# Patient Record
Sex: FEMALE | Race: WHITE | NOT HISPANIC OR LATINO | Employment: UNEMPLOYED | URBAN - METROPOLITAN AREA
[De-identification: names, ages, dates, MRNs, and addresses within clinical notes are randomized per-mention and may not be internally consistent; named-entity substitution may affect disease eponyms.]

---

## 2023-02-27 LAB
ALANINE AMINOTRANSFERASE (SGPT) (U/L) IN SER/PLAS: 33 U/L (ref 7–45)
ALBUMIN (G/DL) IN SER/PLAS: 3.9 G/DL (ref 3.4–5)
ALKALINE PHOSPHATASE (U/L) IN SER/PLAS: 101 U/L (ref 33–110)
AMYLASE (U/L) IN SER/PLAS: 18 U/L (ref 29–103)
ANION GAP IN SER/PLAS: 14 MMOL/L (ref 10–20)
ASPARTATE AMINOTRANSFERASE (SGOT) (U/L) IN SER/PLAS: 21 U/L (ref 9–39)
BASOPHILS (10*3/UL) IN BLOOD BY AUTOMATED COUNT: 0.02 X10E9/L (ref 0–0.1)
BASOPHILS/100 LEUKOCYTES IN BLOOD BY AUTOMATED COUNT: 0.3 % (ref 0–2)
BETA HYDROXYBUTYRATE (MMOL/L) IN SER/PLAS: 0.07 MMOL/L (ref 0.02–0.27)
BILIRUBIN TOTAL (MG/DL) IN SER/PLAS: 0.5 MG/DL (ref 0–1.2)
CALCIDIOL (25 OH VITAMIN D3) (NG/ML) IN SER/PLAS: 17 NG/ML
CALCIUM (MG/DL) IN SER/PLAS: 9.4 MG/DL (ref 8.6–10.3)
CARBON DIOXIDE, TOTAL (MMOL/L) IN SER/PLAS: 29 MMOL/L (ref 21–32)
CHLORIDE (MMOL/L) IN SER/PLAS: 103 MMOL/L (ref 98–107)
COBALAMIN (VITAMIN B12) (PG/ML) IN SER/PLAS: 208 PG/ML (ref 211–911)
CORTISOL (UG/DL) IN SERUM - AM: 38.6 UG/DL (ref 5–20)
CREATININE (MG/DL) IN SER/PLAS: 0.95 MG/DL (ref 0.5–1.05)
EOSINOPHILS (10*3/UL) IN BLOOD BY AUTOMATED COUNT: 0.01 X10E9/L (ref 0–0.7)
EOSINOPHILS/100 LEUKOCYTES IN BLOOD BY AUTOMATED COUNT: 0.1 % (ref 0–6)
ERYTHROCYTE DISTRIBUTION WIDTH (RATIO) BY AUTOMATED COUNT: 13.1 % (ref 11.5–14.5)
ERYTHROCYTE MEAN CORPUSCULAR HEMOGLOBIN CONCENTRATION (G/DL) BY AUTOMATED: 32.1 G/DL (ref 32–36)
ERYTHROCYTE MEAN CORPUSCULAR VOLUME (FL) BY AUTOMATED COUNT: 92 FL (ref 80–100)
ERYTHROCYTES (10*6/UL) IN BLOOD BY AUTOMATED COUNT: 4.37 X10E12/L (ref 4–5.2)
ESTIMATED AVERAGE GLUCOSE FOR HBA1C: 148 MG/DL
GFR FEMALE: 69 ML/MIN/1.73M2
GLUCOSE (MG/DL) IN SER/PLAS: 106 MG/DL (ref 74–99)
HEMATOCRIT (%) IN BLOOD BY AUTOMATED COUNT: 40.2 % (ref 36–46)
HEMOGLOBIN (G/DL) IN BLOOD: 12.9 G/DL (ref 12–16)
HEMOGLOBIN A1C/HEMOGLOBIN TOTAL IN BLOOD: 6.8 %
IMMATURE GRANULOCYTES/100 LEUKOCYTES IN BLOOD BY AUTOMATED COUNT: 0.4 % (ref 0–0.9)
LEUKOCYTES (10*3/UL) IN BLOOD BY AUTOMATED COUNT: 6.9 X10E9/L (ref 4.4–11.3)
LIPASE (U/L) IN SER/PLAS: 15 U/L (ref 9–82)
LYMPHOCYTES (10*3/UL) IN BLOOD BY AUTOMATED COUNT: 3.11 X10E9/L (ref 1.2–4.8)
LYMPHOCYTES/100 LEUKOCYTES IN BLOOD BY AUTOMATED COUNT: 44.8 % (ref 13–44)
MONOCYTES (10*3/UL) IN BLOOD BY AUTOMATED COUNT: 0.42 X10E9/L (ref 0.1–1)
MONOCYTES/100 LEUKOCYTES IN BLOOD BY AUTOMATED COUNT: 6.1 % (ref 2–10)
NEUTROPHILS (10*3/UL) IN BLOOD BY AUTOMATED COUNT: 3.35 X10E9/L (ref 1.2–7.7)
NEUTROPHILS/100 LEUKOCYTES IN BLOOD BY AUTOMATED COUNT: 48.3 % (ref 40–80)
PLATELETS (10*3/UL) IN BLOOD AUTOMATED COUNT: 275 X10E9/L (ref 150–450)
POTASSIUM (MMOL/L) IN SER/PLAS: 4.2 MMOL/L (ref 3.5–5.3)
PROTEIN TOTAL: 7.4 G/DL (ref 6.4–8.2)
SODIUM (MMOL/L) IN SER/PLAS: 142 MMOL/L (ref 136–145)
THYROTROPIN (MIU/L) IN SER/PLAS BY DETECTION LIMIT <= 0.05 MIU/L: 2.21 MIU/L (ref 0.44–3.98)
THYROXINE (T4) FREE (NG/DL) IN SER/PLAS: 1.09 NG/DL (ref 0.61–1.12)
UREA NITROGEN (MG/DL) IN SER/PLAS: 18 MG/DL (ref 6–23)

## 2023-03-01 LAB
CHOLESTEROL (MG/DL) IN SER/PLAS: 170 MG/DL (ref 0–199)
CHOLESTEROL IN HDL (MG/DL) IN SER/PLAS: 40.4 MG/DL
CHOLESTEROL/HDL RATIO: 4.2
LDL: 80 MG/DL (ref 0–99)
NON HDL CHOLESTEROL: 130 MG/DL
TRIGLYCERIDE (MG/DL) IN SER/PLAS: 250 MG/DL (ref 0–149)
VLDL: 50 MG/DL (ref 0–40)

## 2023-03-02 LAB — ADRENOCORTICOTROPIC HORMONE: <1.5 PG/ML (ref 7.2–63.3)

## 2023-03-12 DIAGNOSIS — F41.9 ANXIETY: Primary | ICD-10-CM

## 2023-03-16 PROBLEM — J34.89 SINUS DRAINAGE: Status: ACTIVE | Noted: 2023-03-16

## 2023-03-16 PROBLEM — R68.3 CLUBBING OF FINGERS: Status: ACTIVE | Noted: 2023-03-16

## 2023-03-16 PROBLEM — C34.90: Status: ACTIVE | Noted: 2023-03-16

## 2023-03-16 PROBLEM — L02.92 BOIL: Status: ACTIVE | Noted: 2023-03-16

## 2023-03-16 PROBLEM — C34.90 LUNG CANCER METASTATIC TO BRAIN (MULTI): Status: ACTIVE | Noted: 2023-03-16

## 2023-03-16 PROBLEM — E11.9 DIABETES (MULTI): Status: ACTIVE | Noted: 2023-03-16

## 2023-03-16 PROBLEM — R04.2 HEMOPTYSIS: Status: ACTIVE | Noted: 2023-03-16

## 2023-03-16 PROBLEM — E78.1 HYPERTRIGLYCERIDEMIA: Status: ACTIVE | Noted: 2023-03-16

## 2023-03-16 PROBLEM — R73.9 ELEVATED RANDOM BLOOD GLUCOSE LEVEL: Status: ACTIVE | Noted: 2023-03-16

## 2023-03-16 PROBLEM — K21.9 GERD (GASTROESOPHAGEAL REFLUX DISEASE): Status: ACTIVE | Noted: 2023-03-16

## 2023-03-16 PROBLEM — H81.10 BPPV (BENIGN PAROXYSMAL POSITIONAL VERTIGO): Status: ACTIVE | Noted: 2023-03-16

## 2023-03-16 PROBLEM — K58.9 IBS (IRRITABLE BOWEL SYNDROME): Status: ACTIVE | Noted: 2023-03-16

## 2023-03-16 PROBLEM — I83.93 VARICOSE VEINS OF LEGS: Status: ACTIVE | Noted: 2023-03-16

## 2023-03-16 PROBLEM — F41.9 ANXIETY: Status: ACTIVE | Noted: 2023-03-16

## 2023-03-16 PROBLEM — K82.8 GALLBLADDER SLUDGE: Status: ACTIVE | Noted: 2023-03-16

## 2023-03-16 PROBLEM — R73.09 ELEVATED RANDOM BLOOD GLUCOSE LEVEL: Status: ACTIVE | Noted: 2023-03-16

## 2023-03-16 PROBLEM — N18.31 STAGE 3A CHRONIC KIDNEY DISEASE (MULTI): Status: ACTIVE | Noted: 2023-03-16

## 2023-03-16 PROBLEM — E03.2 HYPOTHYROIDISM DUE TO MEDICATION: Status: ACTIVE | Noted: 2023-03-16

## 2023-03-16 PROBLEM — J32.9 CHRONIC SINUSITIS: Status: ACTIVE | Noted: 2023-03-16

## 2023-03-16 PROBLEM — E55.9 MILD VITAMIN D DEFICIENCY: Status: ACTIVE | Noted: 2023-03-16

## 2023-03-16 PROBLEM — R91.8 LUNG MASS: Status: ACTIVE | Noted: 2023-03-16

## 2023-03-16 PROBLEM — Z86.0100 HISTORY OF COLON POLYPS: Status: ACTIVE | Noted: 2023-03-16

## 2023-03-16 PROBLEM — T31.0 BURNS INVOLVING LESS THAN 10% OF BODY SURFACE: Status: ACTIVE | Noted: 2023-03-16

## 2023-03-16 PROBLEM — L03.319 CELLULITIS OF TRUNK: Status: ACTIVE | Noted: 2023-03-16

## 2023-03-16 PROBLEM — C79.31 METASTATIC CANCER TO BRAIN (MULTI): Status: ACTIVE | Noted: 2023-03-16

## 2023-03-16 PROBLEM — H55.00 NYSTAGMUS: Status: ACTIVE | Noted: 2023-03-16

## 2023-03-16 PROBLEM — H93.11 TINNITUS OF RIGHT EAR: Status: ACTIVE | Noted: 2023-03-16

## 2023-03-16 PROBLEM — R59.0 MEDIASTINAL LYMPHADENOPATHY: Status: ACTIVE | Noted: 2023-03-16

## 2023-03-16 PROBLEM — F32.A DEPRESSION: Status: ACTIVE | Noted: 2023-03-16

## 2023-03-16 PROBLEM — G45.8 SUBCLAVIAN STEAL SYNDROME: Status: ACTIVE | Noted: 2023-03-16

## 2023-03-16 PROBLEM — C34.90 ADENOCARCINOMA, LUNG (MULTI): Status: ACTIVE | Noted: 2023-03-16

## 2023-03-16 PROBLEM — C79.31 LUNG CANCER METASTATIC TO BRAIN (MULTI): Status: ACTIVE | Noted: 2023-03-16

## 2023-03-16 PROBLEM — M25.531 RIGHT WRIST PAIN: Status: ACTIVE | Noted: 2023-03-16

## 2023-03-16 PROBLEM — R73.03 PREDIABETES: Status: ACTIVE | Noted: 2023-03-16

## 2023-03-16 PROBLEM — B35.1 ONYCHOMYCOSIS OF TOENAIL: Status: ACTIVE | Noted: 2023-03-16

## 2023-03-16 PROBLEM — L70.0 OPEN COMEDONE: Status: ACTIVE | Noted: 2023-03-16

## 2023-03-16 PROBLEM — R06.02 SOB (SHORTNESS OF BREATH): Status: ACTIVE | Noted: 2023-03-16

## 2023-03-16 PROBLEM — G47.33 OSA (OBSTRUCTIVE SLEEP APNEA): Status: ACTIVE | Noted: 2023-03-16

## 2023-03-16 PROBLEM — Z86.010 HISTORY OF COLON POLYPS: Status: ACTIVE | Noted: 2023-03-16

## 2023-03-16 PROBLEM — R74.01 ELEVATED ALT MEASUREMENT: Status: ACTIVE | Noted: 2023-03-16

## 2023-03-16 PROBLEM — I73.9 PAD (PERIPHERAL ARTERY DISEASE) (CMS-HCC): Status: ACTIVE | Noted: 2023-03-16

## 2023-03-16 PROBLEM — R09.89 BRUIT OF RIGHT CAROTID ARTERY: Status: ACTIVE | Noted: 2023-03-16

## 2023-03-16 PROBLEM — E27.40 ADRENAL INSUFFICIENCY (MULTI): Status: ACTIVE | Noted: 2023-03-16

## 2023-03-16 RX ORDER — ONDANSETRON 4 MG/1
4 TABLET, FILM COATED ORAL EVERY 8 HOURS PRN
COMMUNITY
End: 2023-03-22 | Stop reason: SDUPTHER

## 2023-03-16 RX ORDER — TRAZODONE HYDROCHLORIDE 50 MG/1
50 TABLET ORAL NIGHTLY PRN
COMMUNITY
Start: 2023-02-21 | End: 2023-05-16 | Stop reason: SDUPTHER

## 2023-03-16 RX ORDER — LEVOTHYROXINE SODIUM 175 UG/1
175 TABLET ORAL EVERY OTHER DAY
COMMUNITY

## 2023-03-16 RX ORDER — HYDROCORTISONE 10 MG/1
TABLET ORAL
COMMUNITY
End: 2024-01-30 | Stop reason: SDUPTHER

## 2023-03-16 RX ORDER — BLOOD SUGAR DIAGNOSTIC
STRIP MISCELLANEOUS
COMMUNITY

## 2023-03-16 RX ORDER — ERGOCALCIFEROL 1.25 MG/1
1 CAPSULE ORAL
COMMUNITY
Start: 2023-03-01 | End: 2023-05-16 | Stop reason: SDUPTHER

## 2023-03-16 RX ORDER — LANCETS
EACH MISCELLANEOUS
COMMUNITY
Start: 2022-12-22

## 2023-03-16 RX ORDER — PANTOPRAZOLE SODIUM 40 MG/1
40 TABLET, DELAYED RELEASE ORAL
COMMUNITY
End: 2023-03-22 | Stop reason: SDUPTHER

## 2023-03-16 RX ORDER — DEXAMETHASONE SODIUM PHOSPHATE 4 MG/ML
INJECTION, SOLUTION INTRA-ARTICULAR; INTRALESIONAL; INTRAMUSCULAR; INTRAVENOUS; SOFT TISSUE
COMMUNITY
Start: 2022-03-20

## 2023-03-16 RX ORDER — OMEGA-3-ACID ETHYL ESTERS 1 G/1
2 CAPSULE, LIQUID FILLED ORAL
COMMUNITY
End: 2024-01-30 | Stop reason: SDUPTHER

## 2023-03-16 RX ORDER — NAPROXEN SODIUM 220 MG/1
81 TABLET, FILM COATED ORAL
COMMUNITY
Start: 2020-02-19

## 2023-03-16 RX ORDER — LEVOTHYROXINE SODIUM 150 UG/1
150 TABLET ORAL EVERY OTHER DAY
COMMUNITY
End: 2024-01-30 | Stop reason: SDUPTHER

## 2023-03-16 RX ORDER — METFORMIN HYDROCHLORIDE 500 MG/1
500 TABLET ORAL DAILY
COMMUNITY
End: 2024-02-05 | Stop reason: SDUPTHER

## 2023-03-16 RX ORDER — CLOPIDOGREL BISULFATE 75 MG/1
75 TABLET ORAL DAILY
COMMUNITY
End: 2023-06-20 | Stop reason: SDUPTHER

## 2023-03-16 RX ORDER — SYRINGE WITH NEEDLE, 1 ML 25GX5/8"
SYRINGE, EMPTY DISPOSABLE MISCELLANEOUS
COMMUNITY
Start: 2022-06-22

## 2023-03-16 RX ORDER — REPAGLINIDE 0.5 MG/1
0.5 TABLET ORAL
COMMUNITY
Start: 2023-03-01 | End: 2024-01-30 | Stop reason: ALTCHOICE

## 2023-03-16 RX ORDER — ISOPROPYL ALCOHOL 70 ML/100ML
SWAB TOPICAL
COMMUNITY
Start: 2022-06-20

## 2023-03-16 RX ORDER — IPRATROPIUM BROMIDE 21 UG/1
2 SPRAY, METERED NASAL
COMMUNITY
Start: 2022-09-08

## 2023-03-16 RX ORDER — ATORVASTATIN CALCIUM 20 MG/1
20 TABLET, FILM COATED ORAL DAILY
COMMUNITY
End: 2023-03-22 | Stop reason: SDUPTHER

## 2023-03-16 RX ORDER — LANOLIN ALCOHOL/MO/W.PET/CERES
1000 CREAM (GRAM) TOPICAL DAILY
COMMUNITY
End: 2023-03-17 | Stop reason: SDUPTHER

## 2023-03-17 ENCOUNTER — OFFICE VISIT (OUTPATIENT)
Dept: PRIMARY CARE | Facility: CLINIC | Age: 58
End: 2023-03-17
Payer: MEDICAID

## 2023-03-17 VITALS
OXYGEN SATURATION: 96 % | TEMPERATURE: 97.4 F | DIASTOLIC BLOOD PRESSURE: 71 MMHG | HEIGHT: 66 IN | WEIGHT: 172 LBS | HEART RATE: 75 BPM | SYSTOLIC BLOOD PRESSURE: 109 MMHG | BODY MASS INDEX: 27.64 KG/M2

## 2023-03-17 DIAGNOSIS — E53.8 VITAMIN B12 DEFICIENCY: ICD-10-CM

## 2023-03-17 DIAGNOSIS — K43.9 VENTRAL HERNIA WITHOUT OBSTRUCTION OR GANGRENE: Primary | ICD-10-CM

## 2023-03-17 DIAGNOSIS — E55.9 VITAMIN D DEFICIENCY: ICD-10-CM

## 2023-03-17 DIAGNOSIS — Z12.11 ENCOUNTER FOR COLORECTAL CANCER SCREENING: ICD-10-CM

## 2023-03-17 DIAGNOSIS — Z12.12 ENCOUNTER FOR COLORECTAL CANCER SCREENING: ICD-10-CM

## 2023-03-17 PROCEDURE — 3074F SYST BP LT 130 MM HG: CPT | Performed by: STUDENT IN AN ORGANIZED HEALTH CARE EDUCATION/TRAINING PROGRAM

## 2023-03-17 PROCEDURE — 3044F HG A1C LEVEL LT 7.0%: CPT | Performed by: STUDENT IN AN ORGANIZED HEALTH CARE EDUCATION/TRAINING PROGRAM

## 2023-03-17 PROCEDURE — 1036F TOBACCO NON-USER: CPT | Performed by: STUDENT IN AN ORGANIZED HEALTH CARE EDUCATION/TRAINING PROGRAM

## 2023-03-17 PROCEDURE — 3078F DIAST BP <80 MM HG: CPT | Performed by: STUDENT IN AN ORGANIZED HEALTH CARE EDUCATION/TRAINING PROGRAM

## 2023-03-17 PROCEDURE — 96372 THER/PROPH/DIAG INJ SC/IM: CPT | Performed by: STUDENT IN AN ORGANIZED HEALTH CARE EDUCATION/TRAINING PROGRAM

## 2023-03-17 PROCEDURE — 99213 OFFICE O/P EST LOW 20 MIN: CPT | Performed by: STUDENT IN AN ORGANIZED HEALTH CARE EDUCATION/TRAINING PROGRAM

## 2023-03-17 RX ORDER — LANOLIN ALCOHOL/MO/W.PET/CERES
1000 CREAM (GRAM) TOPICAL DAILY
Qty: 30 TABLET | Refills: 1 | Status: SHIPPED | OUTPATIENT
Start: 2023-03-17 | End: 2023-05-16 | Stop reason: SDUPTHER

## 2023-03-17 RX ORDER — CYANOCOBALAMIN 1000 UG/ML
1000 INJECTION, SOLUTION INTRAMUSCULAR; SUBCUTANEOUS ONCE
Status: COMPLETED | OUTPATIENT
Start: 2023-03-17 | End: 2023-03-17

## 2023-03-17 RX ADMIN — CYANOCOBALAMIN 1000 MCG: 1000 INJECTION, SOLUTION INTRAMUSCULAR; SUBCUTANEOUS at 13:02

## 2023-03-17 ASSESSMENT — ENCOUNTER SYMPTOMS
DIZZINESS: 0
CONSTIPATION: 0
FLANK PAIN: 0
ABDOMINAL PAIN: 0
SHORTNESS OF BREATH: 0
DYSURIA: 0
COUGH: 0
LIGHT-HEADEDNESS: 0
DIARRHEA: 0
NAUSEA: 0
HEADACHES: 0
VOMITING: 0
RHINORRHEA: 0
MYALGIAS: 0
FEVER: 0
FATIGUE: 0
ABDOMINAL DISTENTION: 1
ARTHRALGIAS: 0

## 2023-03-17 NOTE — PROGRESS NOTES
"Subjective   Patient ID: Betsy Sams is a 57 y.o. female who presents for Follow-up (Hernia ).    HPI     Concern for hernia   Patient states that has been present and she would like to have this dealt with as she does not want to keep getting larger.  No significant pain or nausea or vomiting.  No significant tenderness over area specifically.  Patient also would like to get ordered for colonoscopy prior to her wellness visit that is scheduled.  She needs treatment for her vitamin B12 deficiency.    Review of Systems   Constitutional:  Negative for fatigue and fever.   HENT:  Negative for congestion and rhinorrhea.    Respiratory:  Negative for cough and shortness of breath.    Gastrointestinal:  Positive for abdominal distention (hernia). Negative for abdominal pain, constipation, diarrhea, nausea and vomiting.   Genitourinary:  Negative for dysuria, flank pain and urgency.   Musculoskeletal:  Negative for arthralgias and myalgias.   Neurological:  Negative for dizziness, light-headedness and headaches.       Objective   /71   Pulse 75   Temp 36.3 °C (97.4 °F)   Ht 1.676 m (5' 6\")   Wt 78 kg (172 lb)   SpO2 96%   BMI 27.76 kg/m²     Physical Exam  Vitals and nursing note reviewed.   Constitutional:       Appearance: Normal appearance. She is normal weight.   Cardiovascular:      Rate and Rhythm: Normal rate and regular rhythm.      Heart sounds: Normal heart sounds.   Pulmonary:      Effort: Pulmonary effort is normal.      Breath sounds: Normal breath sounds.   Abdominal:      General: Abdomen is flat. Bowel sounds are normal. There is no distension.      Palpations: Abdomen is soft.      Tenderness: There is no abdominal tenderness. There is no rebound.      Hernia: A hernia is present.   Neurological:      Mental Status: She is alert.         Assessment/Plan   Problem List Items Addressed This Visit          Digestive    Ventral hernia without obstruction or gangrene - Primary     Referral " placed to general surgery.         Relevant Orders    Referral to General Surgery       Endocrine/Metabolic    Vitamin D deficiency    Vitamin B12 deficiency     Replacement ordered.  We will check labs at next visit in May         Relevant Medications    cyanocobalamin (Vitamin B-12) 1,000 mcg tablet       Other    Encounter for colorectal cancer screening     Colonoscopy ordered.         Relevant Orders    Colonoscopy     Patient will follow-up for her already scheduled wellness visit.

## 2023-03-22 ENCOUNTER — TELEPHONE (OUTPATIENT)
Dept: PRIMARY CARE | Facility: CLINIC | Age: 58
End: 2023-03-22
Payer: MEDICAID

## 2023-03-22 DIAGNOSIS — R11.0 NAUSEA: ICD-10-CM

## 2023-03-22 DIAGNOSIS — E78.1 HYPERTRIGLYCERIDEMIA: ICD-10-CM

## 2023-03-22 DIAGNOSIS — K21.9 GASTROESOPHAGEAL REFLUX DISEASE, UNSPECIFIED WHETHER ESOPHAGITIS PRESENT: Primary | ICD-10-CM

## 2023-03-22 NOTE — TELEPHONE ENCOUNTER
Pt requesting a refill of the following medications:    [facility] # [phone]    1). Ondansetron HCI  4 MG  Take 1 tablet every 8 hours as needed for nausea  #30 (pt requesting 90 day)  Refill: 1    2). Pantoprazole Sodium  40 MG  1qd  #30 (pt requesting 90 day)  Refill: 1    3). Atorvastatin Calcium  20 MG  1qd  #90  Refill: 3

## 2023-03-23 RX ORDER — ATORVASTATIN CALCIUM 20 MG/1
20 TABLET, FILM COATED ORAL DAILY
Qty: 90 TABLET | Refills: 1 | Status: SHIPPED | OUTPATIENT
Start: 2023-03-23 | End: 2023-06-20 | Stop reason: SDUPTHER

## 2023-03-23 RX ORDER — PANTOPRAZOLE SODIUM 40 MG/1
40 TABLET, DELAYED RELEASE ORAL
Qty: 90 TABLET | Refills: 1 | Status: SHIPPED | OUTPATIENT
Start: 2023-03-23 | End: 2023-06-20 | Stop reason: SDUPTHER

## 2023-03-23 RX ORDER — ONDANSETRON 4 MG/1
4 TABLET, FILM COATED ORAL EVERY 8 HOURS PRN
Qty: 90 TABLET | Refills: 1 | Status: SHIPPED | OUTPATIENT
Start: 2023-03-23 | End: 2023-06-21

## 2023-03-26 PROBLEM — E55.9 VITAMIN D DEFICIENCY: Status: ACTIVE | Noted: 2023-03-26

## 2023-03-26 PROBLEM — E53.8 VITAMIN B12 DEFICIENCY: Status: ACTIVE | Noted: 2023-03-26

## 2023-03-26 PROBLEM — Z12.12 ENCOUNTER FOR COLORECTAL CANCER SCREENING: Status: ACTIVE | Noted: 2023-03-26

## 2023-03-26 PROBLEM — K43.9 VENTRAL HERNIA WITHOUT OBSTRUCTION OR GANGRENE: Status: ACTIVE | Noted: 2023-03-26

## 2023-03-26 PROBLEM — Z12.11 ENCOUNTER FOR COLORECTAL CANCER SCREENING: Status: ACTIVE | Noted: 2023-03-26

## 2023-04-21 ENCOUNTER — TELEMEDICINE (OUTPATIENT)
Dept: PRIMARY CARE | Facility: CLINIC | Age: 58
End: 2023-04-21

## 2023-04-21 ENCOUNTER — APPOINTMENT (OUTPATIENT)
Dept: PRIMARY CARE | Facility: CLINIC | Age: 58
End: 2023-04-21

## 2023-04-21 DIAGNOSIS — F41.9 ANXIETY: Primary | ICD-10-CM

## 2023-04-21 NOTE — PROGRESS NOTES
Collaborative Care ([facility]) Initial Assessment    Session Time  Start: 1:04 pm  End: 2:04 pm     Collaborative Care program information (including case discussion with psychiatry, involvement of [facility] and billing when applicable) was provided and discussed with the patient. Patient Indicated understanding and agreed to proceed.   Confirm: Yes    No data recorded      Reason for Visit / Chief Complaint  Chief Complaint   Patient presents with    Anxiety       Accompanied by: Self  Guardian Status: Self  Caregiver Status: Does not have a caregiver    Review of Symptoms    Sleep   Average Hours Sleep in/Night:  4-6 hours  Prepares Self for Sleep at Time: varies  Usual Wake up Time: 7 am  Sleep Symptoms: interrupted sleep  Sleep Hygiene: fair sleep hygiene    Mood   Symptom Onset/Duration:  2+ years  Current Sx: little interest/pleasure doing things, feeling depressed, feeling tired/little energy, and anger/irritability  Triggers: situation(s) and people  Past Sx:     Anxiety   Symptom Onset/Duration: 2+ years  Current Sx: feeling nervous/anxious/on edge, difficulty stopping/controlling worry, easily annoyed/irritable, and trouble concentrating. Negative thinking habits.  Panic / Somatic Sx: chest pain/discomfort  Triggers: situation(s) and place(s)  Past Sx:     Self-Esteem / Self-Image   Self Esteem Rating (1-10 Scale, 10 being high): 7  Self-Esteem / Self Image Sx: feels has good qualities    Appetite   Description of Overall Appetite: good appetite  Eating Behaviors: eats balanced meals  Concerns with appetite: none, denied    Anger / Irritability  Symptoms of Anger / Irritability: easily agitated, ,can act out towards others.     Communication / Self Expression  Communication Style & Concerns: assertive and aggressive    Trauma    Symptoms Onset/Duration: symptoms more than one month  Traumatic Experiences:  cancer  Current Symptoms Related to Traumatic Experience: fear and difficulty concentrating  Triggers:  situation(s)        Hallucinations / Delusions   Hallucinations & Delusions Experienced: none, denied    Learning Concerns / Memory   Learning Concerns & Sx: none, denied  Memory Concerns & Sx: none, denied    Functional impairment   Impacting ADL's: no impairment   Impacting IADL's: No impairment  Impacting Ability : No impairment    Associated Medical Concerns   Potential Associated Factors: cancer      Comprehensive Behavioral Health History     Medications  Current Mental Health Medications:   None/Unknown    Past Mental Health Medications:   None/Unknown    Concerns / challenges / barriers with taking medications? No concerns    Open to medication recommendations from consulting psychiatrist? Yes    Do you ever forget to take your medication? No  If yes, how often?     Mental Health Treatment History  Mental Health Treatment:   Reason/When/Where/Outcome:     Risk History  Suicidal Thoughts/Method/Intent/Plan: None, denied  Suicide Attempts/Preparations: None, denied  Number of Suicide Attempts: 0  Access to Firearms/Lethal Means: No guns in home  Non-Suicidal Self Injury: None, denied  Last Fowler Risk Score:    Protective Factors: good protective factors    Violence: None, denied  Homicidal Thoughts/Method/Plan/Intent: None, denied  Homicidal Attempts/Preparations: None, denied  Number of Attempts: 0      Substance Use History    Substances  none          Addiction Treatment     Types of Addiction Treatment:  none  Currently Sober?     Status/Length of Sobriety:     Family History    Mental Health / Conditions    Family Member Condition / Diagnosis Medications / Side Effects   Sister Bipolar                           History of Suicide    Family Member Details   cousin Completed attempt           Social History    Housing   Living Situation: lives with ex partner, they are friends  Safe Housing Conditions / Feels Safe in Home: Yes    Employment  Current Employment:  retired /disability  Current  Concerns/Challenges: No    Income   Current Concerns/Challenges: No  Receive Benefits/Assistance: No    Education   Status / Level of Education: Completed high school    Legal   Legal Considerations: None, denied    Relationships   S/O:  not together but still good relationship.  Parents/Guardian:   Siblings: close relationship with sister  Friends: good friendships  Other:             Transportation   Transportation Concerns: active 's license    Temple/ Spirituality   Are you Baptism or Spiritual: Yes  Temple / Practice: Lutheran  Spiritual Practice:     Coping / Strengths / Supports   Coping:   engaging with dogs  Strengths: brave and exercise  Supports: Sister/family, friends, partner      Abuse History  Physical Abuse: No  Sexual Abuse: No  Verbal / Emotional Abuse / Bullying (+Cyber): No   Financial Abuse: No  Domestic Violence: No    Assessment Summary  / Plan    Assessment Summary:  What do you want to work on/get out of collaborative care? Be happier    Met with patient via phone for an intake for collaborative care. Patient is being referred by Dr. Montiel for her anxiety. Patient was cooperative and talkative during the session and at times could be tangential but was able to be redirected back on topic. Patient discussed that she has stage 4 lung cancer and she has been on medication for it for the last two years for her treatment instead of using traditional chemotherapy. Patient also has a good relationship with God which has helped her with these diagnosis, but she is also upset that her cancer was not caught earlier because her scans were misread and not discovered until closer to the end of the year 2 years ago.     Patient reports the following symptoms for her mood, little interest/pleasure doing things, feeling depressed, feeling tired/little energy, and anger/irritability. Patient reports the following symptoms for her anxiety, feeling nervous/anxious/on edge, difficulty  stopping/controlling worry, easily annoyed/irritable, and trouble concentrating. Patient also has negative thinking habits and will experience some chest discomfort as a physical symptoms. Patient reports being easily agitated and irritable. She reports still having a good appetite and healthy self esteem.     Patient denies history of suicidal ideations, homicidal ideations, and has no access to weapons. Patient denies history of hallucinations, delusions, and paranoia. Patient denies hospitalizations and self harming injurious behavior and has never been in treatment for mental health before. Patient discussed that finding out that she had cancer was traumatic for her and she worries about when her time to go will be but also is trying to be accepting of her situation. Patient denies history of abuse including physical, emotional, and sexual.     Patient a great support system in her friends, family, partner, along with her elzbieta. Patient does not currently work due to her medical issues.  Patient would like to be happier and reduce her current symptoms.     Plan:   Psych consult - ongoing and bi-weekly        Provisional Findings / Impressions  Primary: anxiety    Secondary:     Goals

## 2023-04-28 ENCOUNTER — DOCUMENTATION (OUTPATIENT)
Dept: BEHAVIORAL HEALTH | Facility: CLINIC | Age: 58
End: 2023-04-28

## 2023-04-28 NOTE — PROGRESS NOTES
[facility] Psychiatry Consult Note     Betsy Sams is a 57 y.o., referred to Collaborative Care for symptoms of depression and anxiety. I have reviewed the patient with the behavioral health manager and reviewed the patient's electronic record.    Recommendations:   -Betsy is currently interested in therapy alone and working on acceptance of her diagnosis and working on her outlook.     -Can reconsult if interested in medication for anxiety/depression in the future.     Collateral with [facility]:   She noted low energy, anger, lower motivation over the past few years along adjustment to her cancer diagnosis.     Currently on trazodone.     No data recorded    The above treatment considerations and suggestions are based on consultations with the patient's care manager and a review of information available in the electronic medical record. I have not personally examined the patient. All recommendations should be implemented with consideration of the patient's relevant prior history and current clinical status. Please feel free to call me with any questions about the care of this patient. I can easily be reached through [employer].

## 2023-05-01 LAB
ALANINE AMINOTRANSFERASE (SGPT) (U/L) IN SER/PLAS: 30 U/L (ref 7–45)
ALBUMIN (G/DL) IN SER/PLAS: 4 G/DL (ref 3.4–5)
ALKALINE PHOSPHATASE (U/L) IN SER/PLAS: 94 U/L (ref 33–110)
ANION GAP IN SER/PLAS: 14 MMOL/L (ref 10–20)
ASPARTATE AMINOTRANSFERASE (SGOT) (U/L) IN SER/PLAS: 19 U/L (ref 9–39)
BASOPHILS (10*3/UL) IN BLOOD BY AUTOMATED COUNT: 0.03 X10E9/L (ref 0–0.1)
BASOPHILS/100 LEUKOCYTES IN BLOOD BY AUTOMATED COUNT: 0.4 % (ref 0–2)
BILIRUBIN TOTAL (MG/DL) IN SER/PLAS: 0.5 MG/DL (ref 0–1.2)
CALCIUM (MG/DL) IN SER/PLAS: 9.7 MG/DL (ref 8.6–10.3)
CARBON DIOXIDE, TOTAL (MMOL/L) IN SER/PLAS: 25 MMOL/L (ref 21–32)
CHLORIDE (MMOL/L) IN SER/PLAS: 104 MMOL/L (ref 98–107)
CORTISOL (UG/DL) IN SERUM: 36.8 UG/DL (ref 2.5–20)
CREATININE (MG/DL) IN SER/PLAS: 0.96 MG/DL (ref 0.5–1.05)
CREATININE (MG/DL) IN SER/PLAS: 0.97 MG/DL (ref 0.5–1.05)
ERYTHROCYTE DISTRIBUTION WIDTH (RATIO) BY AUTOMATED COUNT: 12.8 % (ref 11.5–14.5)
ERYTHROCYTE MEAN CORPUSCULAR HEMOGLOBIN CONCENTRATION (G/DL) BY AUTOMATED: 32.2 G/DL (ref 32–36)
ERYTHROCYTE MEAN CORPUSCULAR VOLUME (FL) BY AUTOMATED COUNT: 92 FL (ref 80–100)
ERYTHROCYTES (10*6/UL) IN BLOOD BY AUTOMATED COUNT: 4.56 X10E12/L (ref 4–5.2)
GFR FEMALE: 68 ML/MIN/1.73M2
GFR FEMALE: 68 ML/MIN/1.73M2
GLUCOSE (MG/DL) IN SER/PLAS: 97 MG/DL (ref 74–99)
HEMATOCRIT (%) IN BLOOD BY AUTOMATED COUNT: 41.9 % (ref 36–46)
HEMOGLOBIN (G/DL) IN BLOOD: 13.5 G/DL (ref 12–16)
IMMATURE GRANULOCYTES/100 LEUKOCYTES IN BLOOD BY AUTOMATED COUNT: 0.4 % (ref 0–0.9)
LEUKOCYTES (10*3/UL) IN BLOOD BY AUTOMATED COUNT: 8.1 X10E9/L (ref 4.4–11.3)
LYMPHOCYTES (10*3/UL) IN BLOOD BY AUTOMATED COUNT: 2.5 X10E9/L (ref 1.2–4.8)
LYMPHOCYTES/100 LEUKOCYTES IN BLOOD BY AUTOMATED COUNT: 30.8 % (ref 13–44)
MAGNESIUM (MG/DL) IN SER/PLAS: 1.84 MG/DL (ref 1.6–2.4)
MONOCYTES (10*3/UL) IN BLOOD BY AUTOMATED COUNT: 0.4 X10E9/L (ref 0.1–1)
MONOCYTES/100 LEUKOCYTES IN BLOOD BY AUTOMATED COUNT: 4.9 % (ref 2–10)
NEUTROPHILS (10*3/UL) IN BLOOD BY AUTOMATED COUNT: 5.16 X10E9/L (ref 1.2–7.7)
NEUTROPHILS/100 LEUKOCYTES IN BLOOD BY AUTOMATED COUNT: 63.5 % (ref 40–80)
PLATELETS (10*3/UL) IN BLOOD AUTOMATED COUNT: 260 X10E9/L (ref 150–450)
POTASSIUM (MMOL/L) IN SER/PLAS: 4.1 MMOL/L (ref 3.5–5.3)
PROTEIN TOTAL: 7.5 G/DL (ref 6.4–8.2)
SODIUM (MMOL/L) IN SER/PLAS: 139 MMOL/L (ref 136–145)
THYROTROPIN (MIU/L) IN SER/PLAS BY DETECTION LIMIT <= 0.05 MIU/L: 1.25 MIU/L (ref 0.44–3.98)
THYROXINE (T4) (UG/DL) IN SER/PLAS: 15.8 UG/DL (ref 4.5–11.1)
UREA NITROGEN (MG/DL) IN SER/PLAS: 16 MG/DL (ref 6–23)

## 2023-05-02 ENCOUNTER — DOCUMENTATION (OUTPATIENT)
Dept: PRIMARY CARE | Facility: CLINIC | Age: 58
End: 2023-05-02
Payer: MEDICAID

## 2023-05-02 DIAGNOSIS — F32.A DEPRESSION, UNSPECIFIED DEPRESSION TYPE: ICD-10-CM

## 2023-05-02 DIAGNOSIS — F41.9 ANXIETY: Primary | ICD-10-CM

## 2023-05-02 PROCEDURE — 99492 1ST PSYC COLLAB CARE MGMT: CPT | Performed by: STUDENT IN AN ORGANIZED HEALTH CARE EDUCATION/TRAINING PROGRAM

## 2023-05-04 LAB — ADRENOCORTICOTROPIC HORMONE: 2.8 PG/ML (ref 7.2–63.3)

## 2023-05-08 ENCOUNTER — SOCIAL WORK (OUTPATIENT)
Dept: PRIMARY CARE | Facility: CLINIC | Age: 58
End: 2023-05-08

## 2023-05-08 ASSESSMENT — ANXIETY QUESTIONNAIRES
IF YOU CHECKED OFF ANY PROBLEMS ON THIS QUESTIONNAIRE, HOW DIFFICULT HAVE THESE PROBLEMS MADE IT FOR YOU TO DO YOUR WORK, TAKE CARE OF THINGS AT HOME, OR GET ALONG WITH OTHER PEOPLE: SOMEWHAT DIFFICULT
7. FEELING AFRAID AS IF SOMETHING AWFUL MIGHT HAPPEN: SEVERAL DAYS
5. BEING SO RESTLESS THAT IT IS HARD TO SIT STILL: SEVERAL DAYS
4. TROUBLE RELAXING: MORE THAN HALF THE DAYS
6. BECOMING EASILY ANNOYED OR IRRITABLE: SEVERAL DAYS
2. NOT BEING ABLE TO STOP OR CONTROL WORRYING: SEVERAL DAYS
GAD7 TOTAL SCORE: 8
3. WORRYING TOO MUCH ABOUT DIFFERENT THINGS: SEVERAL DAYS
1. FEELING NERVOUS, ANXIOUS, OR ON EDGE: SEVERAL DAYS

## 2023-05-08 ASSESSMENT — PATIENT HEALTH QUESTIONNAIRE - PHQ9
6. FEELING BAD ABOUT YOURSELF - OR THAT YOU ARE A FAILURE OR HAVE LET YOURSELF OR YOUR FAMILY DOWN: SEVERAL DAYS
1. LITTLE INTEREST OR PLEASURE IN DOING THINGS: SEVERAL DAYS
9. THOUGHTS THAT YOU WOULD BE BETTER OFF DEAD, OR OF HURTING YOURSELF: NOT AT ALL
7. TROUBLE CONCENTRATING ON THINGS, SUCH AS READING THE NEWSPAPER OR WATCHING TELEVISION: NOT AT ALL
3. TROUBLE FALLING OR STAYING ASLEEP: SEVERAL DAYS
2. FEELING DOWN, DEPRESSED OR HOPELESS: MORE THAN HALF THE DAYS
SUM OF ALL RESPONSES TO PHQ QUESTIONS 1-9: 9
4. FEELING TIRED OR HAVING LITTLE ENERGY: NEARLY EVERY DAY
SUM OF ALL RESPONSES TO PHQ9 QUESTIONS 1 & 2: 3
10. IF YOU CHECKED OFF ANY PROBLEMS, HOW DIFFICULT HAVE THESE PROBLEMS MADE IT FOR YOU TO DO YOUR WORK, TAKE CARE OF THINGS AT HOME, OR GET ALONG WITH OTHER PEOPLE: SOMEWHAT DIFFICULT
5. POOR APPETITE OR OVEREATING: NOT AT ALL
8. MOVING OR SPEAKING SO SLOWLY THAT OTHER PEOPLE COULD HAVE NOTICED. OR THE OPPOSITE, BEING SO FIGETY OR RESTLESS THAT YOU HAVE BEEN MOVING AROUND A LOT MORE THAN USUAL: SEVERAL DAYS

## 2023-05-08 NOTE — PROGRESS NOTES
Collaborative Care ([facility])  Progress Note    Type of Interaction: In Office    Start Time: 11:08 am    End Time: 12:08 pm        Appointment: Scheduled    Reason for Visit:   Chief Complaint   Patient presents with    Anxiety    Depression    Met with patient for a follow up appointment in person. Scans came back okay that there has been no growth for her lung cancer and so it is just watch and wait; patient has been treatment for 2 years. Patient discussed that she did not get that much sleep and how tired she has been as of late and found out that she has low vitamin d and B 12.  Talked about what she would like to do with her time left which includes being happier and learning how to relax more and enjoy life. Discussed stressors with her partner that she lives with, they have been together for 17 and now there chula some ongoing issues. Patient would like to be in a good place before she passes on and is hopeful that she can be.       Interval History / Patient Symptoms:     Patient Health Questionnaire-9 Score: 9 (5/8/2023 12:24 PM)  COLT-7 Total Score: 8 (5/8/2023 12:25 PM)        Interventions Provided: Treatment Planning and person centered      Progress Made: Mixed    Response to Intervention: patient engaging and discussed what she would like to work on and what she struggles with.         Plan:   Follow Up / Next Appointment: Next appointment: 05/22/23

## 2023-05-16 ENCOUNTER — OFFICE VISIT (OUTPATIENT)
Dept: PRIMARY CARE | Facility: CLINIC | Age: 58
End: 2023-05-16
Payer: MEDICAID

## 2023-05-16 ENCOUNTER — LAB (OUTPATIENT)
Dept: LAB | Facility: LAB | Age: 58
End: 2023-05-16
Payer: MEDICAID

## 2023-05-16 VITALS
OXYGEN SATURATION: 94 % | SYSTOLIC BLOOD PRESSURE: 99 MMHG | DIASTOLIC BLOOD PRESSURE: 64 MMHG | WEIGHT: 175 LBS | HEART RATE: 66 BPM | TEMPERATURE: 97.7 F | BODY MASS INDEX: 28.12 KG/M2 | HEIGHT: 66 IN

## 2023-05-16 DIAGNOSIS — F32.A DEPRESSION, UNSPECIFIED DEPRESSION TYPE: ICD-10-CM

## 2023-05-16 DIAGNOSIS — C79.31 METASTATIC CANCER TO BRAIN (MULTI): ICD-10-CM

## 2023-05-16 DIAGNOSIS — C34.91 ADENOCARCINOMA OF RIGHT LUNG (MULTI): ICD-10-CM

## 2023-05-16 DIAGNOSIS — E55.9 VITAMIN D DEFICIENCY: ICD-10-CM

## 2023-05-16 DIAGNOSIS — E78.1 HYPERTRIGLYCERIDEMIA: ICD-10-CM

## 2023-05-16 DIAGNOSIS — C34.91: ICD-10-CM

## 2023-05-16 DIAGNOSIS — G45.8 SUBCLAVIAN STEAL SYNDROME: ICD-10-CM

## 2023-05-16 DIAGNOSIS — Z00.00 ENCOUNTER FOR WELLNESS EXAMINATION IN ADULT: Primary | ICD-10-CM

## 2023-05-16 DIAGNOSIS — E27.40 ADRENAL INSUFFICIENCY (MULTI): ICD-10-CM

## 2023-05-16 DIAGNOSIS — Z12.12 ENCOUNTER FOR COLORECTAL CANCER SCREENING: ICD-10-CM

## 2023-05-16 DIAGNOSIS — E03.2 HYPOTHYROIDISM DUE TO MEDICATION: ICD-10-CM

## 2023-05-16 DIAGNOSIS — G47.00 INSOMNIA, UNSPECIFIED TYPE: ICD-10-CM

## 2023-05-16 DIAGNOSIS — C79.31 LUNG CANCER METASTATIC TO BRAIN (MULTI): ICD-10-CM

## 2023-05-16 DIAGNOSIS — I73.9 PAD (PERIPHERAL ARTERY DISEASE) (CMS-HCC): ICD-10-CM

## 2023-05-16 DIAGNOSIS — Z90.710 HISTORY OF HYSTERECTOMY: ICD-10-CM

## 2023-05-16 DIAGNOSIS — E53.8 VITAMIN B12 DEFICIENCY: ICD-10-CM

## 2023-05-16 DIAGNOSIS — E11.65 TYPE 2 DIABETES MELLITUS WITH HYPERGLYCEMIA, WITHOUT LONG-TERM CURRENT USE OF INSULIN (MULTI): ICD-10-CM

## 2023-05-16 DIAGNOSIS — Z12.11 ENCOUNTER FOR COLORECTAL CANCER SCREENING: ICD-10-CM

## 2023-05-16 DIAGNOSIS — R59.0 MEDIASTINAL LYMPHADENOPATHY: ICD-10-CM

## 2023-05-16 DIAGNOSIS — F41.9 ANXIETY: ICD-10-CM

## 2023-05-16 DIAGNOSIS — Z12.31 ENCOUNTER FOR SCREENING MAMMOGRAM FOR MALIGNANT NEOPLASM OF BREAST: ICD-10-CM

## 2023-05-16 DIAGNOSIS — C34.90 LUNG CANCER METASTATIC TO BRAIN (MULTI): ICD-10-CM

## 2023-05-16 PROCEDURE — 3044F HG A1C LEVEL LT 7.0%: CPT | Performed by: STUDENT IN AN ORGANIZED HEALTH CARE EDUCATION/TRAINING PROGRAM

## 2023-05-16 PROCEDURE — 82306 VITAMIN D 25 HYDROXY: CPT

## 2023-05-16 PROCEDURE — 82607 VITAMIN B-12: CPT

## 2023-05-16 PROCEDURE — 36415 COLL VENOUS BLD VENIPUNCTURE: CPT

## 2023-05-16 PROCEDURE — 3078F DIAST BP <80 MM HG: CPT | Performed by: STUDENT IN AN ORGANIZED HEALTH CARE EDUCATION/TRAINING PROGRAM

## 2023-05-16 PROCEDURE — 3074F SYST BP LT 130 MM HG: CPT | Performed by: STUDENT IN AN ORGANIZED HEALTH CARE EDUCATION/TRAINING PROGRAM

## 2023-05-16 PROCEDURE — 99213 OFFICE O/P EST LOW 20 MIN: CPT | Performed by: STUDENT IN AN ORGANIZED HEALTH CARE EDUCATION/TRAINING PROGRAM

## 2023-05-16 PROCEDURE — 1036F TOBACCO NON-USER: CPT | Performed by: STUDENT IN AN ORGANIZED HEALTH CARE EDUCATION/TRAINING PROGRAM

## 2023-05-16 PROCEDURE — 99396 PREV VISIT EST AGE 40-64: CPT | Performed by: STUDENT IN AN ORGANIZED HEALTH CARE EDUCATION/TRAINING PROGRAM

## 2023-05-16 RX ORDER — TRAZODONE HYDROCHLORIDE 50 MG/1
50 TABLET ORAL NIGHTLY PRN
Qty: 90 TABLET | Refills: 1 | Status: SHIPPED | OUTPATIENT
Start: 2023-05-16 | End: 2023-09-12 | Stop reason: SDUPTHER

## 2023-05-16 RX ORDER — ESCITALOPRAM OXALATE 5 MG/1
5 TABLET ORAL DAILY
Qty: 30 TABLET | Refills: 0 | Status: SHIPPED | OUTPATIENT
Start: 2023-05-16 | End: 2023-06-20 | Stop reason: SDUPTHER

## 2023-05-16 RX ORDER — LANOLIN ALCOHOL/MO/W.PET/CERES
1000 CREAM (GRAM) TOPICAL DAILY
Qty: 90 TABLET | Refills: 1 | Status: SHIPPED | OUTPATIENT
Start: 2023-05-16 | End: 2023-08-16 | Stop reason: SDUPTHER

## 2023-05-16 RX ORDER — ERGOCALCIFEROL 1.25 MG/1
1 CAPSULE ORAL
Qty: 12 CAPSULE | Refills: 0 | Status: SHIPPED | OUTPATIENT
Start: 2023-05-16 | End: 2023-08-14

## 2023-05-16 ASSESSMENT — PROMIS GLOBAL HEALTH SCALE
RATE_QUALITY_OF_LIFE: GOOD
RATE_AVERAGE_FATIGUE: MILD
RATE_MENTAL_HEALTH: GOOD
CARRYOUT_PHYSICAL_ACTIVITIES: MOSTLY
RATE_GENERAL_HEALTH: FAIR
RATE_PHYSICAL_HEALTH: GOOD
RATE_SOCIAL_SATISFACTION: FAIR
EMOTIONAL_PROBLEMS: SOMETIMES
CARRYOUT_SOCIAL_ACTIVITIES: GOOD
RATE_AVERAGE_PAIN: 2

## 2023-05-16 ASSESSMENT — ENCOUNTER SYMPTOMS
MYALGIAS: 0
COLOR CHANGE: 0
SHORTNESS OF BREATH: 0
DIARRHEA: 0
PALPITATIONS: 0
FATIGUE: 0
FEVER: 0
FREQUENCY: 0
DIZZINESS: 0
RHINORRHEA: 0
NUMBNESS: 0
ARTHRALGIAS: 0
CONSTIPATION: 0
VOMITING: 0
DYSURIA: 0
NAUSEA: 0
ABDOMINAL PAIN: 0
NERVOUS/ANXIOUS: 0
CHILLS: 0
COUGH: 0
LIGHT-HEADEDNESS: 0
DYSPHORIC MOOD: 0

## 2023-05-16 NOTE — ASSESSMENT & PLAN NOTE
Overall doing well.  No acute concerns or complaints today.  Reviewed results of lab work with the patient.  Patient continuing to follow with her multiple specialist primarily for her metastatic cancer.  Refilled medications for her vitamin D and B12 as well as for her trazodone for insomnia.  Ordered repeat vitamin D and B12 levels.  Ordered bilateral screening mammogram.  Patient is agreeable to starting low-dose Lexapro for longstanding depression and anxiety.  Overall plan on follow-up in about 1 month.  She will call back sooner for acute concerns or complaints.

## 2023-05-16 NOTE — ASSESSMENT & PLAN NOTE
Following with hematology and oncology with Dr. Michelle Owen with . Repeat Brain MRI September 2023.

## 2023-05-16 NOTE — ASSESSMENT & PLAN NOTE
Following with Endocrinology with Dr. Wayne with  Recently had levels checked. Has an appointment in July 2023

## 2023-05-16 NOTE — PROGRESS NOTES
"Subjective   Patient ID: Betsy Sams is a 57 y.o. female who presents for Annual Exam.    HPI     No acute concerns or complaints today.  She has been on the vitamin D and the B12. She needs refills today.  She thinks that she is due for repeat testing.    She did see the surgeon for her hernia. She decided to get all the other testing done first. She will be calling to get thing set up.    Dental: Will be calling to set up an appointment.  Vision: Glasses. Will be calling for an exam    Diet: Well balanced. Diabetic diet  Exercise: Walking. Push Mower.  Caffeine: Tea in the morning.  Fluids: Plenty  Supplements: B12, Vitamin D, Omega 3    Tobacco: None. Quit over 2 years ago.  Alcohol: None.  Recreational Drugs: None    Last Colonoscopy: Ordered and will be getting scheduled.  Last Pap smear: hysterectomy in 2008.  Mammogram: Never done. Ordered  Low Dose Lung CT Screening: Followed by Heme/Onc for metastatic lung cancer.    Influenza: Usually gets  Tetanus: 2022  Pneumonia: 2021  COVID: Up to date  Shingles: Never had. Recommended     Review of Systems   Constitutional:  Negative for chills, fatigue and fever.   HENT:  Negative for congestion and rhinorrhea.    Eyes:  Negative for visual disturbance.   Respiratory:  Negative for cough and shortness of breath.    Cardiovascular:  Negative for chest pain and palpitations.   Gastrointestinal:  Negative for abdominal pain, constipation, diarrhea, nausea and vomiting.   Genitourinary:  Negative for dysuria and frequency.   Musculoskeletal:  Negative for arthralgias and myalgias.   Skin:  Negative for color change and rash.   Neurological:  Negative for dizziness, light-headedness and numbness.   Psychiatric/Behavioral:  Negative for dysphoric mood. The patient is not nervous/anxious.        Objective   BP 99/64   Pulse 66   Temp 36.5 °C (97.7 °F)   Ht 1.67 m (5' 5.75\")   Wt 79.4 kg (175 lb)   SpO2 94%   BMI 28.46 kg/m²   BSA Body surface area is 1.92 meters " squared.      Physical Exam  Vitals and nursing note reviewed.   Constitutional:       General: She is not in acute distress.     Appearance: Normal appearance. She is not ill-appearing or toxic-appearing.   HENT:      Head: Normocephalic and atraumatic.      Right Ear: Tympanic membrane, ear canal and external ear normal.      Left Ear: Tympanic membrane, ear canal and external ear normal.      Nose: Nose normal.      Mouth/Throat:      Mouth: Mucous membranes are moist.   Eyes:      Extraocular Movements: Extraocular movements intact.      Conjunctiva/sclera: Conjunctivae normal.      Pupils: Pupils are equal, round, and reactive to light.   Cardiovascular:      Rate and Rhythm: Normal rate and regular rhythm.      Pulses: Normal pulses.      Heart sounds: Normal heart sounds.   Pulmonary:      Effort: Pulmonary effort is normal.      Breath sounds: Normal breath sounds.   Abdominal:      General: Abdomen is flat. Bowel sounds are normal.      Palpations: Abdomen is soft.   Musculoskeletal:         General: Normal range of motion.      Cervical back: Normal range of motion and neck supple.   Skin:     General: Skin is warm.   Neurological:      General: No focal deficit present.      Mental Status: She is alert and oriented to person, place, and time. Mental status is at baseline.      Cranial Nerves: No cranial nerve deficit.      Sensory: No sensory deficit.   Psychiatric:         Mood and Affect: Mood normal.         Behavior: Behavior normal.         Thought Content: Thought content normal.         Judgment: Judgment normal.       Orders Only on 05/01/2023   Component Date Value Ref Range Status    Adrenocorticotropic Hormone (ACTH) 05/01/2023 2.8 (L)  7.2 - 63.3 pg/mL Final    Comment: INTERPRETIVE INFORMATION: Adrenocorticotropic Hormone  Reference interval based on samples collected between 7 a.m. and   10 a.m.  No reference intervals established for p.m. collections.    Pediatric reference values are the  same as adults (Acta Paediatr   Scand 1981;70:341-345).  This assay measures intact ACTH 1-39;   some types of synthetic ACTH and ACTH fragments are not detected   by this assay.  Performed By: [facility]  [address] [place] [id number]  : Monster Rider MD, PhD     Orders Only on 05/01/2023   Component Date Value Ref Range Status    WBC 05/01/2023 8.1  4.4 - 11.3 x10E9/L Final    RBC 05/01/2023 4.56  4.00 - 5.20 x10E12/L Final    Hemoglobin 05/01/2023 13.5  12.0 - 16.0 g/dL Final    Hematocrit 05/01/2023 41.9  36.0 - 46.0 % Final    MCV 05/01/2023 92  80 - 100 fL Final    MCHC 05/01/2023 32.2  32.0 - 36.0 g/dL Final    Platelets 05/01/2023 260  150 - 450 x10E9/L Final    RDW 05/01/2023 12.8  11.5 - 14.5 % Final    Neutrophils % 05/01/2023 63.5  40.0 - 80.0 % Final    Immature Granulocytes %, Automated 05/01/2023 0.4  0.0 - 0.9 % Final    Comment:  Immature Granulocyte Count (IG) includes promyelocytes,    myelocytes and metamyelocytes but does not include bands.   Percent differential counts (%) should be interpreted in the   context of the absolute cell counts (cells/L).      Lymphocytes % 05/01/2023 30.8  13.0 - 44.0 % Final    Monocytes % 05/01/2023 4.9  2.0 - 10.0 % Final    Basophils % 05/01/2023 0.4  0.0 - 2.0 % Final    Neutrophils Absolute 05/01/2023 5.16  1.20 - 7.70 x10E9/L Final    Lymphocytes Absolute 05/01/2023 2.50  1.20 - 4.80 x10E9/L Final    Monocytes Absolute 05/01/2023 0.40  0.10 - 1.00 x10E9/L Final    Basophils Absolute 05/01/2023 0.03  0.00 - 0.10 x10E9/L Final   Orders Only on 05/01/2023   Component Date Value Ref Range Status    TSH 05/01/2023 1.25  0.44 - 3.98 mIU/L Final    Comment:  TSH testing is performed using different testing    methodology at [facility] than at other    [facility]. Direct result comparisons should    only be made within the same method.     Orders Only on 05/01/2023   Component Date Value Ref Range Status     Glucose 05/01/2023 97  74 - 99 mg/dL Final    Sodium 05/01/2023 139  136 - 145 mmol/L Final    Potassium 05/01/2023 4.1  3.5 - 5.3 mmol/L Final    Chloride 05/01/2023 104  98 - 107 mmol/L Final    Bicarbonate 05/01/2023 25  21 - 32 mmol/L Final    Anion Gap 05/01/2023 14  10 - 20 mmol/L Final    Urea Nitrogen 05/01/2023 16  6 - 23 mg/dL Final    Creatinine 05/01/2023 0.96  0.50 - 1.05 mg/dL Final    GFR Female 05/01/2023 68  >90 mL/min/1.73m2 Final    Comment:  CALCULATIONS OF ESTIMATED GFR ARE PERFORMED   USING THE 2021 CKD-EPI STUDY REFIT EQUATION   WITHOUT THE RACE VARIABLE FOR THE IDMS-TRACEABLE   CREATININE METHODS.    https://jasn.asnjournals.org/content/early/2021/09/22/ASN.9867321745      Calcium 05/01/2023 9.7  8.6 - 10.3 mg/dL Final    Albumin 05/01/2023 4.0  3.4 - 5.0 g/dL Final    Alkaline Phosphatase 05/01/2023 94  33 - 110 U/L Final    Total Protein 05/01/2023 7.5  6.4 - 8.2 g/dL Final    AST 05/01/2023 19  9 - 39 U/L Final    Total Bilirubin 05/01/2023 0.5  0.0 - 1.2 mg/dL Final    ALT (SGPT) 05/01/2023 30  7 - 45 U/L Final    Comment:  Patients treated with Sulfasalazine may generate    falsely decreased results for ALT.     Orders Only on 05/01/2023   Component Date Value Ref Range Status    T4, Total 05/01/2023 15.8 (H)  4.5 - 11.1 ug/dL Final   Orders Only on 05/01/2023   Component Date Value Ref Range Status    Cortisol 05/01/2023 36.8 (H)  2.5 - 20.0 ug/dL Final   Orders Only on 05/01/2023   Component Date Value Ref Range Status    Magnesium 05/01/2023 1.84  1.60 - 2.40 mg/dL Final   Orders Only on 05/01/2023   Component Date Value Ref Range Status    Creatinine 05/01/2023 0.97  0.50 - 1.05 mg/dL Final    GFR Female 05/01/2023 68  >90 mL/min/1.73m2 Final    Comment:  CALCULATIONS OF ESTIMATED GFR ARE PERFORMED   USING THE 2021 CKD-EPI STUDY REFIT EQUATION   WITHOUT THE RACE VARIABLE FOR THE IDMS-TRACEABLE   CREATININE  METHODS.    https://jasn.asnjournals.org/content/early/2021/09/22/ASN.9619372474     Orders Only on 03/01/2023   Component Date Value Ref Range Status    Cholesterol 03/01/2023 170  0 - 199 mg/dL Final    Comment: .      AGE      DESIRABLE   BORDERLINE HIGH   HIGH     0-19 Y     0 - 169       170 - 199     >/= 200    20-24 Y     0 - 189       190 - 224     >/= 225         >24 Y     0 - 199       200 - 239     >/= 240   **All ranges are based on fasting samples. Specific   therapeutic targets will vary based on patient-specific   cardiac risk.  .   Pediatric guidelines reference:Pediatrics 2011, 128(S5).   Adult guidelines reference: NCEP ATPIII Guidelines,     CRIS 2001, 258:2486-97  .   Venipuncture immediately after or during the    administration of Metamizole may lead to falsely   low results. Testing should be performed immediately   prior to Metamizole dosing.      HDL 03/01/2023 40.4  mg/dL Final    Comment: .      AGE      VERY LOW   LOW     NORMAL    HIGH       0-19 Y       < 35   < 40     40-45     ----    20-24 Y       ----   < 40       >45     ----      >24 Y       ----   < 40     40-60      >60  .      Cholesterol/HDL Ratio 03/01/2023 4.2   Final    Comment: REF VALUES  DESIRABLE  < 3.4  HIGH RISK  > 5.0      LDL 03/01/2023 80  0 - 99 mg/dL Final    Comment: .                           NEAR      BORD      AGE      DESIRABLE  OPTIMAL    HIGH     HIGH     VERY HIGH     0-19 Y     0 - 109     ---    110-129   >/= 130     ----    20-24 Y     0 - 119     ---    120-159   >/= 160     ----      >24 Y     0 -  99   100-129  130-159   160-189     >/=190  .      VLDL 03/01/2023 50 (H)  0 - 40 mg/dL Final    Triglycerides 03/01/2023 250 (H)  0 - 149 mg/dL Final    Comment: .      AGE      DESIRABLE   BORDERLINE HIGH   HIGH     VERY HIGH   0 D-90 D    19 - 174         ----         ----        ----  91 D- 9 Y     0 -  74        75 -  99     >/= 100      ----    10-19 Y     0 -  89        90 - 129     >/= 130       "----    20-24 Y     0 - 114       115 - 149     >/= 150      ----         >24 Y     0 - 149       150 - 199    200- 499    >/= 500  .   Venipuncture immediately after or during the    administration of Metamizole may lead to falsely   low results. Testing should be performed immediately   prior to Metamizole dosing.      Non HDL Cholesterol 03/01/2023 130  mg/dL Final    Comment:     AGE      DESIRABLE   BORDERLINE HIGH   HIGH     VERY HIGH     0-19 Y     0 - 119       120 - 144     >/= 145    >/= 160    20-24 Y     0 - 149       150 - 189     >/= 190      ----         >24 Y    30 MG/DL ABOVE LDL CHOLESTEROL GOAL  .     Orders Only on 02/27/2023   Component Date Value Ref Range Status    Hemoglobin A1C 02/27/2023 6.8 (A)  % Final    Comment:      Diagnosis of Diabetes-Adults   Non-Diabetic: < or = 5.6%   Increased risk for developing diabetes: 5.7-6.4%   Diagnostic of diabetes: > or = 6.5%  .       Monitoring of Diabetes                Age (y)     Therapeutic Goal (%)   Adults:          >18           <7.0   Pediatrics:    13-18           <7.5                   7-12           <8.0                   0- 6            7.5-8.5   American Diabetes Association. Diabetes Care 33(S1), Jan 2010.      Estimated Average Glucose 02/27/2023 148  MG/DL Final   There may be more visits with results that are not included.     Current Outpatient Medications on File Prior to Visit   Medication Sig Dispense Refill    aspirin 81 mg chewable tablet Chew 1 tablet (81 mg) once daily.      atorvastatin (Lipitor) 20 mg tablet Take 1 tablet (20 mg) by mouth once daily. 90 tablet 1    BD Luer-Haily Syringe 3 mL 25 gauge x 1\" syringe use as directed      clopidogrel (Plavix) 75 mg tablet Take 1 tablet (75 mg) by mouth once daily.      dexAMETHasone (Decadron) 4 mg/mL injection INJECT 4MG IN CASE OF EMERGENCY      hydrocortisone (Cortef) 10 mg tablet take 2 tablets by mouth AT 8 AM, THEN 2 TABLETS AT 2 PM AND 1/2 TABLET AT BEDTIME      ipratropium " (Atrovent) 21 mcg (0.03 %) nasal spray Administer 2 sprays into each nostril. 2-3 times daily      levothyroxine (Synthroid, Levoxyl) 150 mcg tablet Take 1 tablet (150 mcg) by mouth every other day. (ALTERNATE WITH 175 MCG)      levothyroxine (Synthroid, Levoxyl) 175 mcg tablet Take 1 tablet (175 mcg) by mouth every other day. (ALTERNATE WITH 150 MG)      metFORMIN (Glucophage) 500 mg tablet Take 1 tablet (500 mg) by mouth once daily. Take with food.      Microlet Lancet misc use 1 LANCET to TEST BLOOD SUGAR once daily as directed      omega-3 acid ethyl esters (Lovaza) 1 gram capsule Take 2 capsules (2 g) by mouth 2 times a day with meals.      ondansetron (Zofran) 4 mg tablet Take 1 tablet (4 mg) by mouth every 8 hours if needed for nausea. 90 tablet 1    OneTouch Ultra Test strip use 1 TEST STRIP to TEST BLOOD SUGAR once daily      pantoprazole (ProtoNix) 40 mg EC tablet Take 1 tablet (40 mg) by mouth once daily in the morning. Take before meals. 30 MINUTES PRIOR TO BREAKFAST 90 tablet 1    repaglinide (Prandin) 0.5 mg tablet Take 1 tablet (0.5 mg) by mouth 3 times a day before meals.      [DISCONTINUED] cyanocobalamin (Vitamin B-12) 1,000 mcg tablet Take 1 tablet (1,000 mcg) by mouth once daily. 30 tablet 1    [DISCONTINUED] ergocalciferol (Vitamin D-2) 1.25 MG (17312 UT) capsule Take 1 capsule (1,250 mcg) by mouth every 7 days.      [DISCONTINUED] traZODone (Desyrel) 50 mg tablet Take 1 tablet (50 mg) by mouth as needed at bedtime.      alcohol swabs pads, medicated use as directed once daily       No current facility-administered medications on file prior to visit.     No images are attached to the encounter.        Assessment/Plan   Problem List Items Addressed This Visit          Nervous    Metastatic cancer to brain (CMS/HCC)     Following with hematology and oncology with Dr. Michelle Owen with . Repeat Brain MRI September 2023.         Lung cancer metastatic to brain (CMS/HCC)     Following with  hematology and oncology with Dr. Michelle Owen with .         Insomnia    Relevant Medications    traZODone (Desyrel) 50 mg tablet       Respiratory    Adenocarcinoma, lung (CMS/HCC)     Following with hematology and oncology with Dr. Michelle Owen with .         Stage 4 lung cancer (CMS/HCC)     Following with hematology and oncology with Dr. Michelle Owen with .            Circulatory    Mediastinal lymphadenopathy     Following with hematology and oncology with Dr. Michelle Owen with .         PAD (peripheral artery disease) (CMS/[facility])     No current symptoms.         Subclavian steal syndrome     Follows with vascular surgery Dr. Rodriguez            Digestive    Adrenal insufficiency (CMS/HCC)     Following with Endocrinology with Dr. Wayne with  Recently had levels checked. Has an appointment in July 2023            Endocrine/Metabolic    Diabetes (CMS/[facility])     Following with Endocrinology with Dr. Wayne with . Well controlled at this time. On metformin.         Hypothyroidism due to medication     Following with Endocrinology with Dr. Wayne with . Has refills of the levothyroxine.         Vitamin D deficiency     Reordered medication as well as follow-up vitamin D levels.         Relevant Medications    ergocalciferol (Vitamin D-2) 1.25 MG (59277 UT) capsule    Other Relevant Orders    Vitamin D, Total    Vitamin B12 deficiency     Reordered medication as well as follow-up vitamin B12 levels.         Relevant Medications    cyanocobalamin (Vitamin B-12) 1,000 mcg tablet    Other Relevant Orders    Vitamin B12       Other    Anxiety     Overall stable.  Starting on Lexapro 5 mg daily.  Follow-up in 1 month.         Relevant Medications    traZODone (Desyrel) 50 mg tablet    escitalopram (Lexapro) 5 mg tablet    Depression     Overall stable.  Starting on Lexapro 5 mg daily.  Follow-up in 1 month.         Relevant Medications    escitalopram (Lexapro) 5 mg tablet    Hypertriglyceridemia     Continue  current medications.         Encounter for colorectal cancer screening     Colonoscopy ordered. Will get scheduled now that metastatic disease is stable.         Encounter for screening mammogram for malignant neoplasm of breast     Mammogram ordered.         Relevant Orders    BI mammo bilateral screening tomosynthesis    History of hysterectomy     No further testing indicated         Encounter for wellness examination in adult - Primary     Overall doing well.  No acute concerns or complaints today.  Reviewed results of lab work with the patient.  Patient continuing to follow with her multiple specialist primarily for her metastatic cancer.  Refilled medications for her vitamin D and B12 as well as for her trazodone for insomnia.  Ordered repeat vitamin D and B12 levels.  Ordered bilateral screening mammogram.  Patient is agreeable to starting low-dose Lexapro for longstanding depression and anxiety.  Overall plan on follow-up in about 1 month.  She will call back sooner for acute concerns or complaints.

## 2023-05-17 LAB
CALCIDIOL (25 OH VITAMIN D3) (NG/ML) IN SER/PLAS: 64 NG/ML
COBALAMIN (VITAMIN B12) (PG/ML) IN SER/PLAS: 989 PG/ML (ref 211–911)

## 2023-05-22 ENCOUNTER — APPOINTMENT (OUTPATIENT)
Dept: PRIMARY CARE | Facility: CLINIC | Age: 58
End: 2023-05-22

## 2023-05-22 ENCOUNTER — TELEMEDICINE (OUTPATIENT)
Dept: PRIMARY CARE | Facility: CLINIC | Age: 58
End: 2023-05-22

## 2023-05-22 DIAGNOSIS — F32.A DEPRESSION, UNSPECIFIED DEPRESSION TYPE: Primary | ICD-10-CM

## 2023-05-22 NOTE — PROGRESS NOTES
[facility] ([facility])  Progress Note    Type of Interaction: Phone    Start Time: 11:05 am    End Time: 11:32 am        Appointment: Scheduled    Reason for Visit:   Chief Complaint   Patient presents with    Depression    Spoke with patient for a follow up appointment, patient's brakes on her car are not working correctly. She is getting them fixed today.   Patient was prescribed Lexapro 5mg and feels like it is helping a little bit.   Patient went to [place] with friends and went to [employer] and enjoyed herself. They are going to be making trips to different [place], patient enjoys being in different atmospheres.   Sister coming in from [place] for the holiday weekend, they are going to go visit her cousin for memorial day.   Has house to self today and is trying to keep up with house chores such as laundry or wants to take the dog for a ride or will call family and talk with them. Discussed making the most of her time and keeping busy and exercising and meeting her mini goals that she has for each day.       Interval History / Patient Symptoms:     Patient Health Questionnaire-9 Score: 9 (5/8/2023 12:24 PM)  COLT-7 Total Score: 8 (5/8/2023 12:25 PM)        Interventions Provided: Mindfulness      Progress Made: Mixed    Response to Intervention: open and engaging and spoke about how she is choosing to living her life in the moment.        Plan: follow up in 2-3 weeks.      Follow Up / Next Appointment:

## 2023-06-05 ENCOUNTER — DOCUMENTATION (OUTPATIENT)
Dept: PRIMARY CARE | Facility: CLINIC | Age: 58
End: 2023-06-05
Payer: MEDICAID

## 2023-06-05 DIAGNOSIS — F41.9 ANXIETY: Primary | ICD-10-CM

## 2023-06-05 DIAGNOSIS — F32.A DEPRESSION, UNSPECIFIED DEPRESSION TYPE: ICD-10-CM

## 2023-06-05 PROCEDURE — 99494 1ST/SBSQ PSYC COLLAB CARE: CPT | Performed by: STUDENT IN AN ORGANIZED HEALTH CARE EDUCATION/TRAINING PROGRAM

## 2023-06-05 PROCEDURE — 99493 SBSQ PSYC COLLAB CARE MGMT: CPT | Performed by: STUDENT IN AN ORGANIZED HEALTH CARE EDUCATION/TRAINING PROGRAM

## 2023-06-09 ENCOUNTER — TELEPHONE (OUTPATIENT)
Dept: PRIMARY CARE | Facility: CLINIC | Age: 58
End: 2023-06-09

## 2023-06-09 NOTE — TELEPHONE ENCOUNTER
Patient called stating that she has poison ivy both wrist and eyelid and would like to know if you could send in a cream for this. Please advise

## 2023-06-12 ENCOUNTER — TELEMEDICINE (OUTPATIENT)
Dept: PRIMARY CARE | Facility: CLINIC | Age: 58
End: 2023-06-12

## 2023-06-12 DIAGNOSIS — F32.A DEPRESSION, UNSPECIFIED DEPRESSION TYPE: ICD-10-CM

## 2023-06-12 DIAGNOSIS — F41.9 ANXIETY: Primary | ICD-10-CM

## 2023-06-12 NOTE — PROGRESS NOTES
[facility] ([facility])  Progress Note    Type of Interaction: Phone    Start Time: 11:05 am    End Time: 11:40 am        Appointment: Scheduled    Reason for Visit:   Chief Complaint   Patient presents with    Depression    Anxiety    Met with patient for a follow up appointment via phone. Patient discussed that she has poison ivy and is calling the office to see what can be prescribed. Discussed some of her anger/annoyance feelings. Discussed how she has been cutting different lawns and has been getting more exercise this past week.   Patient has not been thinking too much about her depression and anxiety. Anxiety has been heightened as of late due to scans coming up in July. Hoping the treatment she has been doing will help to extend her life; it has in another trial.   Discussed medications and they are still effective.   Discussed being in the moment living each day once day a time and focusing on the present. Patient tries really hard to do this and to be engaging with God first this in the morning as well.     Interval History / Patient Symptoms:     No data recorded      Interventions Provided: Mindfulness      Progress Made: Mixed    Response to Intervention: patient engaging and open to suggestions.         Plan: follow up in 2 weeks  Follow Up / Next Appointment: Next appointment: 06/26/23

## 2023-06-13 ENCOUNTER — APPOINTMENT (OUTPATIENT)
Dept: PRIMARY CARE | Facility: CLINIC | Age: 58
End: 2023-06-13

## 2023-06-20 ENCOUNTER — OFFICE VISIT (OUTPATIENT)
Dept: PRIMARY CARE | Facility: CLINIC | Age: 58
End: 2023-06-20
Payer: MEDICAID

## 2023-06-20 VITALS
OXYGEN SATURATION: 94 % | HEIGHT: 61 IN | TEMPERATURE: 98.3 F | HEART RATE: 73 BPM | WEIGHT: 172 LBS | SYSTOLIC BLOOD PRESSURE: 114 MMHG | BODY MASS INDEX: 32.47 KG/M2 | DIASTOLIC BLOOD PRESSURE: 70 MMHG

## 2023-06-20 DIAGNOSIS — F41.9 ANXIETY: Primary | ICD-10-CM

## 2023-06-20 DIAGNOSIS — G45.8 SUBCLAVIAN STEAL SYNDROME: ICD-10-CM

## 2023-06-20 DIAGNOSIS — E78.1 HYPERTRIGLYCERIDEMIA: ICD-10-CM

## 2023-06-20 DIAGNOSIS — K21.9 GASTROESOPHAGEAL REFLUX DISEASE, UNSPECIFIED WHETHER ESOPHAGITIS PRESENT: ICD-10-CM

## 2023-06-20 DIAGNOSIS — G89.29 CHRONIC PAIN OF RIGHT KNEE: ICD-10-CM

## 2023-06-20 DIAGNOSIS — L23.7 POISON IVY DERMATITIS: ICD-10-CM

## 2023-06-20 DIAGNOSIS — F32.A DEPRESSION, UNSPECIFIED DEPRESSION TYPE: ICD-10-CM

## 2023-06-20 DIAGNOSIS — M25.561 CHRONIC PAIN OF RIGHT KNEE: ICD-10-CM

## 2023-06-20 PROCEDURE — 99214 OFFICE O/P EST MOD 30 MIN: CPT | Performed by: STUDENT IN AN ORGANIZED HEALTH CARE EDUCATION/TRAINING PROGRAM

## 2023-06-20 PROCEDURE — 3078F DIAST BP <80 MM HG: CPT | Performed by: STUDENT IN AN ORGANIZED HEALTH CARE EDUCATION/TRAINING PROGRAM

## 2023-06-20 PROCEDURE — 1036F TOBACCO NON-USER: CPT | Performed by: STUDENT IN AN ORGANIZED HEALTH CARE EDUCATION/TRAINING PROGRAM

## 2023-06-20 PROCEDURE — 3074F SYST BP LT 130 MM HG: CPT | Performed by: STUDENT IN AN ORGANIZED HEALTH CARE EDUCATION/TRAINING PROGRAM

## 2023-06-20 PROCEDURE — 3044F HG A1C LEVEL LT 7.0%: CPT | Performed by: STUDENT IN AN ORGANIZED HEALTH CARE EDUCATION/TRAINING PROGRAM

## 2023-06-20 RX ORDER — ATORVASTATIN CALCIUM 20 MG/1
20 TABLET, FILM COATED ORAL DAILY
Qty: 90 TABLET | Refills: 1 | Status: SHIPPED | OUTPATIENT
Start: 2023-06-20 | End: 2023-09-12 | Stop reason: SDUPTHER

## 2023-06-20 RX ORDER — HYDROCORTISONE 25 MG/G
CREAM TOPICAL 2 TIMES DAILY PRN
Qty: 28 G | Refills: 0 | OUTPATIENT
Start: 2023-06-20 | End: 2024-05-13

## 2023-06-20 RX ORDER — CLOPIDOGREL BISULFATE 75 MG/1
75 TABLET ORAL DAILY
Qty: 90 TABLET | Refills: 0 | Status: SHIPPED | OUTPATIENT
Start: 2023-06-20 | End: 2023-09-12 | Stop reason: SDUPTHER

## 2023-06-20 RX ORDER — ESCITALOPRAM OXALATE 5 MG/1
5 TABLET ORAL DAILY
Qty: 90 TABLET | Refills: 1 | Status: SHIPPED | OUTPATIENT
Start: 2023-06-20 | End: 2023-09-12 | Stop reason: SDUPTHER

## 2023-06-20 RX ORDER — PANTOPRAZOLE SODIUM 40 MG/1
40 TABLET, DELAYED RELEASE ORAL
Qty: 90 TABLET | Refills: 1 | Status: SHIPPED | OUTPATIENT
Start: 2023-06-20 | End: 2023-09-12 | Stop reason: SDUPTHER

## 2023-06-20 ASSESSMENT — ENCOUNTER SYMPTOMS
VOMITING: 0
SHORTNESS OF BREATH: 0
ABDOMINAL PAIN: 0
DIARRHEA: 0
CONSTIPATION: 0
NAUSEA: 0
FATIGUE: 0
COUGH: 0
LIGHT-HEADEDNESS: 0
FEVER: 0
HEADACHES: 0
DYSURIA: 0
FLANK PAIN: 0
RHINORRHEA: 0
MYALGIAS: 0
DIZZINESS: 0
ARTHRALGIAS: 1

## 2023-06-20 NOTE — PROGRESS NOTES
"Subjective   Patient ID: Betsy Sams is a 57 y.o. female who presents for Follow-up.    HPI     She has been feeling better with her Lexapro.  She has run out but did not call for refill until she was seen for an appointment.  She feels better being on the medication.  She has been continuing the counseling.  She definitely feels that she is making improvements.    Her right knee has been causing her problems over the last several weeks.  She denies having any recent imaging of the knee.  She denies feeling like the knee is going to be giving out on her.  She played baseball when she was younger and does not sure if some of what she is experiencing is related to injuries that she had during that time.    She needs refills of some of her other medications.    Review of Systems   Constitutional:  Negative for fatigue and fever.   HENT:  Negative for congestion and rhinorrhea.    Respiratory:  Negative for cough and shortness of breath.    Gastrointestinal:  Negative for abdominal pain, constipation, diarrhea, nausea and vomiting.   Genitourinary:  Negative for dysuria, flank pain and urgency.   Musculoskeletal:  Positive for arthralgias (right knee). Negative for myalgias.   Neurological:  Negative for dizziness, light-headedness and headaches.       Objective   /70   Pulse 73   Temp 36.8 °C (98.3 °F)   Ht 1.543 m (5' 0.75\")   Wt 78 kg (172 lb)   SpO2 94%   BMI 32.77 kg/m²     Physical Exam  Vitals and nursing note reviewed.   Constitutional:       General: She is not in acute distress.     Appearance: Normal appearance. She is normal weight. She is not ill-appearing or toxic-appearing.   Cardiovascular:      Rate and Rhythm: Normal rate and regular rhythm.      Heart sounds: Normal heart sounds.   Pulmonary:      Breath sounds: Normal breath sounds.   Abdominal:      General: Abdomen is flat. Bowel sounds are normal.      Palpations: Abdomen is soft.   Musculoskeletal:         General: No swelling, " deformity or signs of injury. Normal range of motion.      Comments: Varus and valgus testing negative.  Negative Lachman testing.  Sanna's testing negative.   Neurological:      Mental Status: She is alert.         Assessment/Plan   Problem List Items Addressed This Visit       Anxiety - Primary     Refilled escitalopram for the patient.  Advised patient to call when she needs refills if she is getting low.         Relevant Medications    escitalopram (Lexapro) 5 mg tablet    Depression     Refilled escitalopram for the patient.  Advised patient to call when she needs refills if she is getting low.         Relevant Medications    escitalopram (Lexapro) 5 mg tablet    GERD (gastroesophageal reflux disease)     Refilled patient's pantoprazole.         Relevant Medications    pantoprazole (ProtoNix) 40 mg EC tablet    Hypertriglyceridemia     Refilled patient's atorvastatin.         Relevant Medications    atorvastatin (Lipitor) 20 mg tablet    Subclavian steal syndrome     No current symptoms at this time.  Refilled patient's Plavix.         Relevant Medications    clopidogrel (Plavix) 75 mg tablet    Poison ivy dermatitis     Patient given prescription for hydrocortisone cream that she has been using.  She spends regular time outside.         Relevant Medications    hydrocortisone 2.5 % cream    Chronic pain of right knee     X-rays ordered of the right knee as no previous imaging had been done prior.  We will call patient with results.         Relevant Orders    XR knee right 3 views (Completed)

## 2023-06-26 ENCOUNTER — TELEMEDICINE (OUTPATIENT)
Dept: PRIMARY CARE | Facility: CLINIC | Age: 58
End: 2023-06-26

## 2023-06-26 DIAGNOSIS — F41.9 ANXIETY: Primary | ICD-10-CM

## 2023-06-26 ASSESSMENT — ANXIETY QUESTIONNAIRES
1. FEELING NERVOUS, ANXIOUS, OR ON EDGE: SEVERAL DAYS
2. NOT BEING ABLE TO STOP OR CONTROL WORRYING: SEVERAL DAYS
6. BECOMING EASILY ANNOYED OR IRRITABLE: SEVERAL DAYS
GAD7 TOTAL SCORE: 4
3. WORRYING TOO MUCH ABOUT DIFFERENT THINGS: NOT AT ALL
IF YOU CHECKED OFF ANY PROBLEMS ON THIS QUESTIONNAIRE, HOW DIFFICULT HAVE THESE PROBLEMS MADE IT FOR YOU TO DO YOUR WORK, TAKE CARE OF THINGS AT HOME, OR GET ALONG WITH OTHER PEOPLE: SOMEWHAT DIFFICULT
5. BEING SO RESTLESS THAT IT IS HARD TO SIT STILL: SEVERAL DAYS
4. TROUBLE RELAXING: NOT AT ALL
7. FEELING AFRAID AS IF SOMETHING AWFUL MIGHT HAPPEN: NOT AT ALL

## 2023-06-26 ASSESSMENT — PATIENT HEALTH QUESTIONNAIRE - PHQ9
10. IF YOU CHECKED OFF ANY PROBLEMS, HOW DIFFICULT HAVE THESE PROBLEMS MADE IT FOR YOU TO DO YOUR WORK, TAKE CARE OF THINGS AT HOME, OR GET ALONG WITH OTHER PEOPLE: SOMEWHAT DIFFICULT
SUM OF ALL RESPONSES TO PHQ QUESTIONS 1-9: 1
3. TROUBLE FALLING OR STAYING ASLEEP: NOT AT ALL
2. FEELING DOWN, DEPRESSED OR HOPELESS: NOT AT ALL
5. POOR APPETITE OR OVEREATING: NOT AT ALL
SUM OF ALL RESPONSES TO PHQ9 QUESTIONS 1 & 2: 0
7. TROUBLE CONCENTRATING ON THINGS, SUCH AS READING THE NEWSPAPER OR WATCHING TELEVISION: NOT AT ALL
8. MOVING OR SPEAKING SO SLOWLY THAT OTHER PEOPLE COULD HAVE NOTICED. OR THE OPPOSITE, BEING SO FIGETY OR RESTLESS THAT YOU HAVE BEEN MOVING AROUND A LOT MORE THAN USUAL: NOT AT ALL
9. THOUGHTS THAT YOU WOULD BE BETTER OFF DEAD, OR OF HURTING YOURSELF: NOT AT ALL
1. LITTLE INTEREST OR PLEASURE IN DOING THINGS: NOT AT ALL
4. FEELING TIRED OR HAVING LITTLE ENERGY: SEVERAL DAYS
6. FEELING BAD ABOUT YOURSELF - OR THAT YOU ARE A FAILURE OR HAVE LET YOURSELF OR YOUR FAMILY DOWN: NOT AT ALL

## 2023-06-26 NOTE — PROGRESS NOTES
Collaborative Care (CoCM)  Progress Note    Type of Interaction: Phone    Start Time: 11:05 am    End Time: 11: 30 am        Appointment: Scheduled    Reason for Visit:   Chief Complaint   Patient presents with    Anxiety    Depression    Had follow up with patient via phone. Patient recently saw Dr. Montiel to review medications and check out her knee, but appointment went well and feels her medications are effective and does not wish to increase them. Patient did run out before appointment and was reminded that she can call the office for a refill before she runs out. Patient has been keeping herself busy and active especially over this past weekend. Patient discussed [place] and then at her friends house as well. Patient enjoys the summer time so does her best to keep busy and be outside, which the exercise is good for her lungs as well. By engaging in these different activities patient is engaging in mindfulness and learning to be more in the present moment. Overall patient doing well and has no major concerns.       Interval History / Patient Symptoms:     Patient Health Questionnaire-9 Score: 1 (6/26/2023 11:12 AM)  COLT-7 Total Score: 4 (6/26/2023 11:08 AM)        Interventions Provided: Mindfulness      Progress Made: Moderate    Response to Intervention: open and engaging.         Plan: follow up in 2 weeks    Follow Up / Next Appointment: Next appointment: 07/10/23

## 2023-07-05 ENCOUNTER — DOCUMENTATION (OUTPATIENT)
Dept: PRIMARY CARE | Facility: CLINIC | Age: 58
End: 2023-07-05
Payer: MEDICARE

## 2023-07-05 DIAGNOSIS — F41.9 ANXIETY: Primary | ICD-10-CM

## 2023-07-05 PROBLEM — G89.29 CHRONIC PAIN OF RIGHT KNEE: Status: ACTIVE | Noted: 2023-07-05

## 2023-07-05 PROBLEM — M25.561 CHRONIC PAIN OF RIGHT KNEE: Status: ACTIVE | Noted: 2023-07-05

## 2023-07-05 PROBLEM — L23.7 POISON IVY DERMATITIS: Status: ACTIVE | Noted: 2023-07-05

## 2023-07-05 PROCEDURE — 99493 SBSQ PSYC COLLAB CARE MGMT: CPT | Performed by: STUDENT IN AN ORGANIZED HEALTH CARE EDUCATION/TRAINING PROGRAM

## 2023-07-05 PROCEDURE — 99494 1ST/SBSQ PSYC COLLAB CARE: CPT | Performed by: STUDENT IN AN ORGANIZED HEALTH CARE EDUCATION/TRAINING PROGRAM

## 2023-07-05 NOTE — ASSESSMENT & PLAN NOTE
Refilled escitalopram for the patient.  Advised patient to call when she needs refills if she is getting low.

## 2023-07-05 NOTE — ASSESSMENT & PLAN NOTE
Patient given prescription for hydrocortisone cream that she has been using.  She spends regular time outside.

## 2023-07-05 NOTE — ASSESSMENT & PLAN NOTE
X-rays ordered of the right knee as no previous imaging had been done prior.  We will call patient with results.

## 2023-07-10 ENCOUNTER — TELEMEDICINE (OUTPATIENT)
Dept: PRIMARY CARE | Facility: CLINIC | Age: 58
End: 2023-07-10
Payer: MEDICARE

## 2023-07-10 DIAGNOSIS — F32.A DEPRESSION, UNSPECIFIED DEPRESSION TYPE: Primary | ICD-10-CM

## 2023-07-10 NOTE — PROGRESS NOTES
Collaborative Care (CoCM)  Progress Note    Type of Interaction: Phone    Start Time: 11:07 am    End Time: 11:52 am        Appointment: Scheduled    Reason for Visit:   Chief Complaint   Patient presents with    Depression    Spoke with patient via phone and she just got done cutting the lawn. Patient was just treated with cream for her poison ivy and then she caught it again in the same area just, luckily she still has the cream to use.   Will be having a scan done in 2 weeks to make sure the growth is not growing to any other place from her lungs. Last scans came back healthy, patient is hopeful for these scans but tries to not think about it. Does want to talk with oncologist about her being short of breath.     Going to be seeing a friend today to help with some house chores and will continue to be helping the rest of the week. This keeps patient in a good mood and motivated and makes her feel that she is contributing.     Patient will take a hiatus from collaborative care and she is currently feeling fine. She is more concerned about when the weather turns cold that is when her mental health also goes down. It was mentioned if there a crisis or something is happening patient would be able to get a hold of the [facility] if she needs and she always has access to the doctor as well.           Interval History / Patient Symptoms:     Patient Health Questionnaire-9 Score: 1 (6/26/2023 11:12 AM)  COLT-7 Total Score: 4 (6/26/2023 11:08 AM)        Interventions Provided: Mindfulness      Progress Made: Moderate    Response to Intervention: open and engaging        Plan:       Follow Up / Next Appointment:

## 2023-07-18 ENCOUNTER — OFFICE VISIT (OUTPATIENT)
Dept: PRIMARY CARE | Facility: CLINIC | Age: 58
End: 2023-07-18
Payer: MEDICARE

## 2023-07-18 VITALS
OXYGEN SATURATION: 97 % | SYSTOLIC BLOOD PRESSURE: 112 MMHG | DIASTOLIC BLOOD PRESSURE: 78 MMHG | BODY MASS INDEX: 28.45 KG/M2 | HEIGHT: 66 IN | RESPIRATION RATE: 16 BRPM | TEMPERATURE: 97.4 F | WEIGHT: 177 LBS | HEART RATE: 66 BPM

## 2023-07-18 DIAGNOSIS — L24.9 ACUTE IRRITANT CONTACT DERMATITIS: Primary | ICD-10-CM

## 2023-07-18 DIAGNOSIS — N18.31 STAGE 3A CHRONIC KIDNEY DISEASE (MULTI): ICD-10-CM

## 2023-07-18 PROCEDURE — 99214 OFFICE O/P EST MOD 30 MIN: CPT | Performed by: FAMILY MEDICINE

## 2023-07-18 PROCEDURE — 1036F TOBACCO NON-USER: CPT | Performed by: FAMILY MEDICINE

## 2023-07-18 PROCEDURE — 3078F DIAST BP <80 MM HG: CPT | Performed by: FAMILY MEDICINE

## 2023-07-18 PROCEDURE — 3044F HG A1C LEVEL LT 7.0%: CPT | Performed by: FAMILY MEDICINE

## 2023-07-18 PROCEDURE — 3074F SYST BP LT 130 MM HG: CPT | Performed by: FAMILY MEDICINE

## 2023-07-18 RX ORDER — FAMOTIDINE 20 MG/1
20 TABLET, FILM COATED ORAL 2 TIMES DAILY
Qty: 60 TABLET | Refills: 5 | COMMUNITY
Start: 2023-07-18 | End: 2024-01-14

## 2023-07-18 RX ORDER — METHYLPREDNISOLONE 4 MG/1
TABLET ORAL
Qty: 21 TABLET | Refills: 0 | Status: SHIPPED | OUTPATIENT
Start: 2023-07-18 | End: 2023-07-25

## 2023-07-18 ASSESSMENT — ENCOUNTER SYMPTOMS
SLEEP DISTURBANCE: 0
APPETITE CHANGE: 0
CHEST TIGHTNESS: 0
WHEEZING: 0
ANOREXIA: 0
EYE DISCHARGE: 0
HEMATURIA: 0
FEVER: 0
DIZZINESS: 0
VOICE CHANGE: 0
ABDOMINAL PAIN: 0
ADENOPATHY: 0
EYE REDNESS: 0
MYALGIAS: 0
SINUS PAIN: 0
FACIAL SWELLING: 0
NECK STIFFNESS: 0
ACTIVITY CHANGE: 0
BLOOD IN STOOL: 0
DYSURIA: 0
AGITATION: 0
DIARRHEA: 0
ARTHRALGIAS: 0
SHORTNESS OF BREATH: 0
DIAPHORESIS: 0
DEPRESSION: 0
LOSS OF SENSATION IN FEET: 0
WOUND: 0
JOINT SWELLING: 0
SINUS PRESSURE: 0
EYE PAIN: 0
PALPITATIONS: 0
UNEXPECTED WEIGHT CHANGE: 0
TROUBLE SWALLOWING: 0
OCCASIONAL FEELINGS OF UNSTEADINESS: 0
RHINORRHEA: 0
VOMITING: 0
FREQUENCY: 0
BACK PAIN: 0
COUGH: 0
EYE ITCHING: 0
NERVOUS/ANXIOUS: 0
POLYPHAGIA: 0
NAUSEA: 0
COLOR CHANGE: 0
CONSTIPATION: 0
SORE THROAT: 0
CHILLS: 0
POLYDIPSIA: 0
FLANK PAIN: 0
BRUISES/BLEEDS EASILY: 0
FATIGUE: 0

## 2023-07-18 ASSESSMENT — PATIENT HEALTH QUESTIONNAIRE - PHQ9
1. LITTLE INTEREST OR PLEASURE IN DOING THINGS: NOT AT ALL
2. FEELING DOWN, DEPRESSED OR HOPELESS: NOT AT ALL
SUM OF ALL RESPONSES TO PHQ9 QUESTIONS 1 AND 2: 0

## 2023-07-18 NOTE — PROGRESS NOTES
Subjective   Patient ID: Betsy Sams is a 57 y.o. female who presents for Poison Ivy.  Today she is accompanied by alone.     Poison Jewell  The current episode started in the past 7 days. The problem is unchanged. The rash is diffuse. The rash is characterized by blistering, burning, pain, redness and itchiness. She was exposed to plant contact. Pertinent negatives include no anorexia, congestion, cough, diarrhea, eye pain, facial edema, fatigue, fever, joint pain, rhinorrhea, shortness of breath, sore throat or vomiting. Treatments tried: fels-naptha, cold water and poison ivy/oak scrub. The treatment provided mild relief. There is no history of allergies, asthma, eczema or varicella.       Review of Systems   Constitutional:  Negative for activity change, appetite change, chills, diaphoresis, fatigue, fever and unexpected weight change.   HENT:  Negative for congestion, dental problem, drooling, ear discharge, ear pain, facial swelling, hearing loss, nosebleeds, postnasal drip, rhinorrhea, sinus pressure, sinus pain, sneezing, sore throat, tinnitus, trouble swallowing and voice change.    Eyes:  Negative for pain, discharge, redness, itching and visual disturbance.   Respiratory:  Negative for cough, chest tightness, shortness of breath and wheezing.    Cardiovascular:  Negative for chest pain, palpitations and leg swelling.   Gastrointestinal:  Negative for abdominal pain, anorexia, blood in stool, constipation, diarrhea, nausea and vomiting.   Endocrine: Negative for cold intolerance, heat intolerance, polydipsia, polyphagia and polyuria.   Genitourinary:  Negative for decreased urine volume, dysuria, flank pain, frequency, hematuria and urgency.   Musculoskeletal:  Negative for arthralgias, back pain, gait problem, joint pain, joint swelling, myalgias and neck stiffness.   Skin:  Negative for color change, pallor, rash and wound.   Neurological:  Negative for dizziness.   Hematological:  Negative for  "adenopathy. Does not bruise/bleed easily.   Psychiatric/Behavioral:  Negative for agitation, behavioral problems and sleep disturbance. The patient is not nervous/anxious.        Objective   Blood Pressure 112/78   Pulse 66   Temperature 36.3 °C (97.4 °F)   Respiration 16   Height 1.676 m (5' 6\")   Weight 80.3 kg (177 lb)   Oxygen Saturation 97%   Body Mass Index 28.57 kg/m²   BSA: 1.93 meters squared  Growth percentiles: Facility age limit for growth %renetta is 20 years. Facility age limit for growth %renetta is 20 years.     Physical Exam  Vitals and nursing note reviewed.   Constitutional:       General: She is not in acute distress.     Appearance: Normal appearance. She is normal weight. She is not ill-appearing.   HENT:      Head: Normocephalic.      Right Ear: Tympanic membrane, ear canal and external ear normal.      Left Ear: Tympanic membrane, ear canal and external ear normal.      Nose: Nose normal. No rhinorrhea.      Mouth/Throat:      Mouth: Mucous membranes are moist.      Pharynx: Oropharynx is clear.   Eyes:      Extraocular Movements: Extraocular movements intact.      Conjunctiva/sclera: Conjunctivae normal.      Pupils: Pupils are equal, round, and reactive to light.   Cardiovascular:      Rate and Rhythm: Normal rate and regular rhythm.      Pulses: Normal pulses.      Heart sounds: Normal heart sounds.   Pulmonary:      Effort: Pulmonary effort is normal. No respiratory distress.      Breath sounds: Normal breath sounds. No wheezing.   Abdominal:      General: Abdomen is flat. Bowel sounds are normal.      Palpations: Abdomen is soft.      Tenderness: There is no abdominal tenderness. There is no guarding or rebound.      Hernia: No hernia is present.   Musculoskeletal:         General: Normal range of motion.      Cervical back: Normal range of motion and neck supple. No rigidity or tenderness.      Right lower leg: No edema.      Left lower leg: No edema.   Lymphadenopathy:      Cervical: " No cervical adenopathy.   Skin:     General: Skin is warm and dry.      Capillary Refill: Capillary refill takes more than 3 seconds.      Findings: Rash (Diffuse linear erythema on arms, legs, and trunk) present.   Neurological:      General: No focal deficit present.      Mental Status: She is alert and oriented to person, place, and time.      Sensory: No sensory deficit.      Motor: No weakness.      Coordination: Coordination normal.   Psychiatric:         Mood and Affect: Mood normal.         Behavior: Behavior normal.         Thought Content: Thought content normal.         Judgment: Judgment normal.         Assessment/Plan   Problem List Items Addressed This Visit    None  Visit Diagnoses       Acute irritant contact dermatitis    -  Primary    Relevant Medications    methylPREDNISolone (Medrol Dospak) 4 mg tablets    famotidine (Pepcid) 20 mg tablet   Allergic Contact Dermatitis    You have been diagnosed with dermatitis. There are three major forms of dermatitis: xerotic dermatitis (dry skin-related; eczema); nummular dermatitis (unknown cause, possibly dry skin-related); and contact dermatitis. There are two major forms of contact dermatitis: irritant contact dermatitis and allergic contact dermatitis. Your dermatologist believes that you may have allergic contact dermatitis. Allergic contact dermatitis is caused by an immune system reaction in the skin. Red, scaly, weepy, blistery, or rashy areas develop. These are usually very itchy. Your immune system reacts to some external substance and causes a rash on the skin. The classic example of allergic contact dermatitis is poison ivy. Other examples include neosporin allergy (rash around a wound after neosporin was applied to the wound), nickel allergy (rash on the earlobes after wearing a new earring), and perfume allergy (rash on the neck after wearing a new perfume). It is important to know that you can become allergic to something you have been exposed  to many times without ever having a problem before.    The top ten overall causes of allergic contact dermatitis (besides poison ivy):  1) Nickel (a metal)  2) Neomycin (in neosporin)  3) Balsam of [place] (a fragrance mix)   4) Fragrance mix (another mix of fragrances)   5) Thimerosal (usually irrelevant)   6) Gold (a metal)  7) Quaternium-15 (a formaldehyde releaser)   8) Formaldehyde   9) Bacitracin (in neosporin)   10) Cobalt (a metal)  The key to the diagnosis is figuring out what substances are being applied to an area. In localized areas such as around a wound, on the earlobe, on the top of the feet, on the neck, it is often a straightforward diagnosis. However, many patients present with widespread contact dermatitis, where multiple areas of the body are involved. Because numerous substances are in question, these presentations are among the most perplexing cases seen in dermatology.      Widespread allergic contact dermatitis  If you have widespread contact dermatitis, certain household and personal care items are notorious causes. These include: hair dye (especially paraphenylenediamine-containing dyes); shampoo (especially shampoo rich in fragrance like Clairol Herbal Essence and others); fragrance-containing soaps and lotions; clothing (include dyes as well as formaldehyde and formaldehyde-releasers that are put in clothing for wrinkle-free properties); fragrance-containing laundry detergents; and fragrance-containing fabric softeners (especially Bounce). Hair dye and shampoos trickle over the rest of the body during the shower and afterwards by the hands.        The top 15 causes of widespread contact dermatitis are:  (SSDLM = soaps, shampoos, detergents, lotions and makeup; C = clothing)  1) Balsam of [place] (a fragrance mix)  2) Quaternium 15 (SSDLM)  3) Formaldehyde (SSDLM, C)  4) Fragrance mix   5) Methyldibromoglutaronitrile (MDBGN) (SSDLM)  6) Propylene glycol (SSDLM)  7) Diazolidinyl urea (SSDLM)  8)  Imidazolidinyl urea (SSDLM)  9) 2-Bromo-2-nitropane-1,2-diol (bronopol) (SSDLM)  10) Tixocortal pivolate (topical steroids)  11) DMDM hydantoin (SSDLM)   12) Cocomidopropyl betaine (SSDLM)  13) Ethylene urea melamine formaldehyde (SSDLM)  14) Amidoamine (SSDLM)  15) Budesonide (topical steroids)  Patch testing  If avoidance of known causes does not help sort out the cause, then you may need patch testing to have the cause elucidated. Call to schedule this test if you are interested. Patch testing involves three office visits in one week, preferably on a Monday, Wednesday, and Thursday or Friday. On Monday, 50 or more patches are applied to the back, containing most common causes of allergic contact dermatitis. The back is not to be washed until after the Thursday/Friday office visit. On Wednesday, you will return to have the patches removed and have an initial reading. Since some of the allergens take a longer time to cause a reaction, you must return on Thursday or Friday for a second reading. At this visit, you will be given a list of substances (like the above) to which you are allergic. This test is covered by most insurances. Patch testing is best schedule when you have finished any courses of prednisone, which may interfere with the test.       You may not be allergic to any of the substances. If this is the case, it may be necessary to rework your diagnosis. Often one of the other forms of dermatitis mentioned above are more appropriate diagnoses in patients who fail to demonstrate positive reactions on patch testing.         Avoid these substances  It is recommended that you avoid the following items right now until the cause can be determined:  1) Anything with fragrance of any sort (including fragranced shampoo - instead try DHS Clear shampoo)  2) Hair dye  3) Fabric softener   4) Fragrance-based detergents (instead use Dreft or ALL Clear)  5) Non-prescribed lotions and moisturizers  6) Anything else your  dermatologist specifically recommends

## 2023-07-18 NOTE — PATIENT INSTRUCTIONS
Recommend Aveeno oatmeal bath.  Zyrtec twice per day for 10 days.    Allergic Contact Dermatitis    You have been diagnosed with dermatitis. There are three major forms of dermatitis: xerotic dermatitis (dry skin-related; eczema); nummular dermatitis (unknown cause, possibly dry skin-related); and contact dermatitis. There are two major forms of contact dermatitis: irritant contact dermatitis and allergic contact dermatitis. Your dermatologist believes that you may have allergic contact dermatitis. Allergic contact dermatitis is caused by an immune system reaction in the skin. Red, scaly, weepy, blistery, or rashy areas develop. These are usually very itchy. Your immune system reacts to some external substance and causes a rash on the skin. The classic example of allergic contact dermatitis is poison ivy. Other examples include neosporin allergy (rash around a wound after neosporin was applied to the wound), nickel allergy (rash on the earlobes after wearing a new earring), and perfume allergy (rash on the neck after wearing a new perfume). It is important to know that you can become allergic to something you have been exposed to many times without ever having a problem before.    The top ten overall causes of allergic contact dermatitis (besides poison ivy):  1) Nickel (a metal)  2) Neomycin (in neosporin)  3) Balsam of [place] (a fragrance mix)   4) Fragrance mix (another mix of fragrances)   5) Thimerosal (usually irrelevant)   6) Gold (a metal)  7) Quaternium-15 (a formaldehyde releaser)   8) Formaldehyde   9) Bacitracin (in neosporin)   10) Cobalt (a metal)  The key to the diagnosis is figuring out what substances are being applied to an area. In localized areas such as around a wound, on the earlobe, on the top of the feet, on the neck, it is often a straightforward diagnosis. However, many patients present with widespread contact dermatitis, where multiple areas of the body are involved. Because numerous  substances are in question, these presentations are among the most perplexing cases seen in dermatology.      Widespread allergic contact dermatitis  If you have widespread contact dermatitis, certain household and personal care items are notorious causes. These include: hair dye (especially paraphenylenediamine-containing dyes); shampoo (especially shampoo rich in fragrance like Clairol Herbal Essence and others); fragrance-containing soaps and lotions; clothing (include dyes as well as formaldehyde and formaldehyde-releasers that are put in clothing for wrinkle-free properties); fragrance-containing laundry detergents; and fragrance-containing fabric softeners (especially Bounce). Hair dye and shampoos trickle over the rest of the body during the shower and afterwards by the hands.        The top 15 causes of widespread contact dermatitis are:  (SSDLM = soaps, shampoos, detergents, lotions and makeup; C = clothing)  1) Balsam of [place] (a fragrance mix)  2) Quaternium 15 (SSDLM)  3) Formaldehyde (SSDLM, C)  4) Fragrance mix   5) Methyldibromoglutaronitrile (MDBGN) (SSDLM)  6) Propylene glycol (SSDLM)  7) Diazolidinyl urea (SSDLM)  8) Imidazolidinyl urea (SSDLM)  9) 2-Bromo-2-nitropane-1,2-diol (bronopol) (SSDLM)  10) Tixocortal pivolate (topical steroids)  11) DMDM hydantoin (SSDLM)   12) Cocomidopropyl betaine (SSDLM)  13) Ethylene urea melamine formaldehyde (SSDLM)  14) Amidoamine (SSDLM)  15) Budesonide (topical steroids)  Patch testing  If avoidance of known causes does not help sort out the cause, then you may need patch testing to have the cause elucidated. Call to schedule this test if you are interested. Patch testing involves three office visits in one week, preferably on a Monday, Wednesday, and Thursday or Friday. On Monday, 50 or more patches are applied to the back, containing most common causes of allergic contact dermatitis. The back is not to be washed until after the Thursday/Friday office visit. On  Wednesday, you will return to have the patches removed and have an initial reading. Since some of the allergens take a longer time to cause a reaction, you must return on Thursday or Friday for a second reading. At this visit, you will be given a list of substances (like the above) to which you are allergic. This test is covered by most insurances. Patch testing is best schedule when you have finished any courses of prednisone, which may interfere with the test.       You may not be allergic to any of the substances. If this is the case, it may be necessary to rework your diagnosis. Often one of the other forms of dermatitis mentioned above are more appropriate diagnoses in patients who fail to demonstrate positive reactions on patch testing.         Avoid these substances  It is recommended that you avoid the following items right now until the cause can be determined:  1) Anything with fragrance of any sort (including fragranced shampoo - instead try DHS Clear shampoo)  2) Hair dye  3) Fabric softener   4) Fragrance-based detergents (instead use Dreft or ALL Clear)  5) Non-prescribed lotions and moisturizers  6) Anything else your dermatologist specifically recommends

## 2023-07-24 LAB
ALANINE AMINOTRANSFERASE (SGPT) (U/L) IN SER/PLAS: 24 U/L (ref 7–45)
ALBUMIN (G/DL) IN SER/PLAS: 4 G/DL (ref 3.4–5)
ALBUMIN (MG/L) IN URINE: 42.4 MG/L
ALBUMIN/CREATININE (UG/MG) IN URINE: 40.4 UG/MG CRT (ref 0–30)
ALKALINE PHOSPHATASE (U/L) IN SER/PLAS: 100 U/L (ref 33–110)
ANION GAP IN SER/PLAS: 11 MMOL/L (ref 10–20)
ASPARTATE AMINOTRANSFERASE (SGOT) (U/L) IN SER/PLAS: 14 U/L (ref 9–39)
BASOPHILS (10*3/UL) IN BLOOD BY AUTOMATED COUNT: 0.05 X10E9/L (ref 0–0.1)
BASOPHILS/100 LEUKOCYTES IN BLOOD BY AUTOMATED COUNT: 0.5 % (ref 0–2)
BILIRUBIN DIRECT (MG/DL) IN SER/PLAS: 0.1 MG/DL (ref 0–0.3)
BILIRUBIN TOTAL (MG/DL) IN SER/PLAS: 0.6 MG/DL (ref 0–1.2)
C PEPTIDE (NG/ML) IN SER/PLAS: 5.4 NG/ML (ref 0.7–3.9)
CALCIUM (MG/DL) IN SER/PLAS: 9 MG/DL (ref 8.6–10.3)
CARBON DIOXIDE, TOTAL (MMOL/L) IN SER/PLAS: 27 MMOL/L (ref 21–32)
CHLORIDE (MMOL/L) IN SER/PLAS: 103 MMOL/L (ref 98–107)
CHOLESTEROL (MG/DL) IN SER/PLAS: 184 MG/DL (ref 0–199)
CHOLESTEROL IN HDL (MG/DL) IN SER/PLAS: 62.4 MG/DL
CHOLESTEROL/HDL RATIO: 2.9
CORTISOL (UG/DL) IN SERUM - AM: <0.5 UG/DL (ref 5–20)
CREATININE (MG/DL) IN SER/PLAS: 0.87 MG/DL (ref 0.5–1.05)
CREATININE (MG/DL) IN URINE: 105 MG/DL (ref 20–320)
EOSINOPHILS (10*3/UL) IN BLOOD BY AUTOMATED COUNT: 0.02 X10E9/L (ref 0–0.7)
EOSINOPHILS/100 LEUKOCYTES IN BLOOD BY AUTOMATED COUNT: 0.2 % (ref 0–6)
ERYTHROCYTE DISTRIBUTION WIDTH (RATIO) BY AUTOMATED COUNT: 13.2 % (ref 11.5–14.5)
ERYTHROCYTE MEAN CORPUSCULAR HEMOGLOBIN CONCENTRATION (G/DL) BY AUTOMATED: 32.8 G/DL (ref 32–36)
ERYTHROCYTE MEAN CORPUSCULAR VOLUME (FL) BY AUTOMATED COUNT: 92 FL (ref 80–100)
ERYTHROCYTES (10*6/UL) IN BLOOD BY AUTOMATED COUNT: 4.42 X10E12/L (ref 4–5.2)
ESTIMATED AVERAGE GLUCOSE FOR HBA1C: 151 MG/DL
GFR FEMALE: 77 ML/MIN/1.73M2
GLUCOSE (MG/DL) IN SER/PLAS: 96 MG/DL (ref 74–99)
HEMATOCRIT (%) IN BLOOD BY AUTOMATED COUNT: 40.8 % (ref 36–46)
HEMOGLOBIN (G/DL) IN BLOOD: 13.4 G/DL (ref 12–16)
HEMOGLOBIN A1C/HEMOGLOBIN TOTAL IN BLOOD: 6.9 %
IMMATURE GRANULOCYTES/100 LEUKOCYTES IN BLOOD BY AUTOMATED COUNT: 0.9 % (ref 0–0.9)
LDL: 85 MG/DL (ref 0–99)
LEUKOCYTES (10*3/UL) IN BLOOD BY AUTOMATED COUNT: 10.9 X10E9/L (ref 4.4–11.3)
LYMPHOCYTES (10*3/UL) IN BLOOD BY AUTOMATED COUNT: 4.05 X10E9/L (ref 1.2–4.8)
LYMPHOCYTES/100 LEUKOCYTES IN BLOOD BY AUTOMATED COUNT: 37.3 % (ref 13–44)
MAGNESIUM (MG/DL) IN SER/PLAS: 2.06 MG/DL (ref 1.6–2.4)
MONOCYTES (10*3/UL) IN BLOOD BY AUTOMATED COUNT: 0.71 X10E9/L (ref 0.1–1)
MONOCYTES/100 LEUKOCYTES IN BLOOD BY AUTOMATED COUNT: 6.5 % (ref 2–10)
NEUTROPHILS (10*3/UL) IN BLOOD BY AUTOMATED COUNT: 5.93 X10E9/L (ref 1.2–7.7)
NEUTROPHILS/100 LEUKOCYTES IN BLOOD BY AUTOMATED COUNT: 54.6 % (ref 40–80)
PLATELETS (10*3/UL) IN BLOOD AUTOMATED COUNT: 253 X10E9/L (ref 150–450)
POTASSIUM (MMOL/L) IN SER/PLAS: 4.3 MMOL/L (ref 3.5–5.3)
PROTEIN TOTAL: 7.1 G/DL (ref 6.4–8.2)
SODIUM (MMOL/L) IN SER/PLAS: 137 MMOL/L (ref 136–145)
THYROTROPIN (MIU/L) IN SER/PLAS BY DETECTION LIMIT <= 0.05 MIU/L: 0.47 MIU/L (ref 0.44–3.98)
THYROXINE (T4) FREE (NG/DL) IN SER/PLAS: 0.91 NG/DL (ref 0.61–1.12)
TRIGLYCERIDE (MG/DL) IN SER/PLAS: 185 MG/DL (ref 0–149)
TRIIODOTHYRONINE (T3) (NG/DL) IN SER/PLAS: 85 NG/DL (ref 60–200)
UREA NITROGEN (MG/DL) IN SER/PLAS: 27 MG/DL (ref 6–23)
VLDL: 37 MG/DL (ref 0–40)

## 2023-07-25 ENCOUNTER — DOCUMENTATION (OUTPATIENT)
Dept: PRIMARY CARE | Facility: CLINIC | Age: 58
End: 2023-07-25
Payer: MEDICARE

## 2023-07-25 DIAGNOSIS — F41.9 ANXIETY: Primary | ICD-10-CM

## 2023-07-25 PROCEDURE — 99493 SBSQ PSYC COLLAB CARE MGMT: CPT | Performed by: STUDENT IN AN ORGANIZED HEALTH CARE EDUCATION/TRAINING PROGRAM

## 2023-07-25 PROCEDURE — 99494 1ST/SBSQ PSYC COLLAB CARE: CPT | Performed by: STUDENT IN AN ORGANIZED HEALTH CARE EDUCATION/TRAINING PROGRAM

## 2023-07-27 LAB — ADRENOCORTICOTROPIC HORMONE: 1.7 PG/ML (ref 7.2–63.3)

## 2023-08-11 ENCOUNTER — TELEMEDICINE (OUTPATIENT)
Dept: PRIMARY CARE | Facility: CLINIC | Age: 58
End: 2023-08-11
Payer: MEDICARE

## 2023-08-11 ENCOUNTER — PATIENT OUTREACH (OUTPATIENT)
Dept: CARE COORDINATION | Facility: CLINIC | Age: 58
End: 2023-08-11

## 2023-08-11 DIAGNOSIS — U07.1 COVID: ICD-10-CM

## 2023-08-11 DIAGNOSIS — R05.1 ACUTE COUGH: ICD-10-CM

## 2023-08-11 DIAGNOSIS — N17.9 ACUTE KIDNEY INJURY (CMS-HCC): ICD-10-CM

## 2023-08-11 DIAGNOSIS — E27.49 SECONDARY ADRENAL INSUFFICIENCY (MULTI): ICD-10-CM

## 2023-08-11 DIAGNOSIS — J96.01 ACUTE RESPIRATORY FAILURE WITH HYPOXIA (MULTI): ICD-10-CM

## 2023-08-11 DIAGNOSIS — Z09 HOSPITAL DISCHARGE FOLLOW-UP: Primary | ICD-10-CM

## 2023-08-11 PROCEDURE — 99495 TRANSJ CARE MGMT MOD F2F 14D: CPT | Performed by: STUDENT IN AN ORGANIZED HEALTH CARE EDUCATION/TRAINING PROGRAM

## 2023-08-11 RX ORDER — ALBUTEROL SULFATE 90 UG/1
2 AEROSOL, METERED RESPIRATORY (INHALATION) EVERY 4 HOURS PRN
Qty: 18 G | Refills: 1 | Status: SHIPPED | OUTPATIENT
Start: 2023-08-11 | End: 2024-08-10

## 2023-08-11 RX ORDER — DYCLONINE HCL 2 MG
1 LOZENGE MUCOUS MEMBRANE
Qty: 18 LOZENGE | Refills: 1 | OUTPATIENT
Start: 2023-08-11 | End: 2024-05-13

## 2023-08-11 ASSESSMENT — ENCOUNTER SYMPTOMS
CHILLS: 0
PALPITATIONS: 0
MYALGIAS: 0
VOMITING: 0
FATIGUE: 0
FREQUENCY: 0
LIGHT-HEADEDNESS: 0
CONSTIPATION: 0
RHINORRHEA: 0
FEVER: 0
ABDOMINAL PAIN: 0
SHORTNESS OF BREATH: 0
DIZZINESS: 0
NAUSEA: 0
DIARRHEA: 0
COUGH: 1
ARTHRALGIAS: 0
SINUS PAIN: 0
HEADACHES: 1

## 2023-08-11 NOTE — PROGRESS NOTES
Discharge Facility:[facility]  Discharge Diagnosis:U07.1 Covid  Admission Date:8/8/23  Discharge Date: 8/10/23    PCP Appointment Date:8/11/23  Specialist Appointment Date:   Hospital Encounter and Summary: Linked

## 2023-08-11 NOTE — PROGRESS NOTES
Subjective   Patient ID: Betsy Sams is a 57 y.o. female who presents for covid positive.    [facility]     Hospital Follow Up  Admission Date: 08/08/2023  Discharge Date: 08/10/2023  Diagnosis: COVID, acute respiratory failure with hypoxia, secondary adrenal insufficiency, acute on chronic kidney disease.    Patient states that overall her symptoms seemed to start on 8/5/2023 with a cough.  She states that the her symptoms worsened over the next couple of days.  She says when they got really bad she ended up going to the emergency department in [place] on the same day.  She tested positive for COVID.  Labs showed a mild acute kidney injury but no other gross laboratory abnormalities.  Patient's blood pressure had dropped with systolic in the 100s over 60s.  She was also having decreasing oxygen saturation into the mid 80%'s even with minimal movement in the bed.  She denied any significant respiratory symptoms at that time.  She was transferred to a  facility for admission she was admitted for COVID-19 with hypoxia.  Patient was started on dexamethasone with her adrenal insufficiency and low blood pressure.  She was later discharged on Medrol Dosepak before resuming her home hydrocortisone.  Infectious disease was consulted and she was given 2 doses of remdesivir for antiviral treatment.  She was weaned to room air before the end of her admission before being discharged.  Patient's acute kidney injury improved with creatinine going back to baseline with fluids and general care.  Patient was instructed to return to the emergency department if her symptoms worsen or her oxygen levels dropped again.    Symptoms overall start on 8/5/23. Got really bad the next day.  Tested positive 3 days ago.  She had went to the urgent care in [place] on the same day.    She denies any more shortness of breath.  She has 5 days left of the medrol dose pack. She took the first days worth today on 8/11/23.  Patient states that she is keeping  track of her oxygen levels at home with a pulse oximeter.  She denies any changes at this time.    Patient denies any other symptoms such as fevers or chills.  She feels like her breathing is improving from where she was at the hospital.  She is taking all medications as prescribed.  She is continuing with regular follow-up to heme-onc.  Patient does need a refill of her albuterol inhaler.  She also states that she was supposed to be given something for her cough and is wondering if she could have another prescription for it.      Review of Systems   Constitutional:  Negative for chills, fatigue and fever.   HENT:  Positive for postnasal drip. Negative for congestion, rhinorrhea and sinus pain.    Respiratory:  Positive for cough. Negative for shortness of breath.    Cardiovascular:  Negative for chest pain and palpitations.   Gastrointestinal:  Negative for abdominal pain, constipation, diarrhea, nausea and vomiting.   Genitourinary:  Negative for decreased urine volume and frequency.   Musculoskeletal:  Negative for arthralgias and myalgias.   Neurological:  Positive for headaches. Negative for dizziness and light-headedness.       Objective   There were no vitals taken for this visit.    Limited to observations with virtual visit.  Physical Exam  Nursing note reviewed.   Constitutional:       General: She is not in acute distress.     Appearance: Normal appearance. She is normal weight. She is not ill-appearing or toxic-appearing.   HENT:      Head: Normocephalic and atraumatic.   Pulmonary:      Effort: Pulmonary effort is normal. No respiratory distress.   Neurological:      Mental Status: She is alert.         Assessment/Plan   Problem List Items Addressed This Visit    None  Visit Diagnoses       Hospital discharge follow-up    -  Primary    COVID        Relevant Medications    albuterol (Ventolin HFA) 90 mcg/actuation inhaler    Acute respiratory failure with hypoxia (CMS/[facility])        Relevant Medications     albuterol (Ventolin HFA) 90 mcg/actuation inhaler    Acute kidney injury (CMS/HCC)        Secondary adrenal insufficiency (CMS/HCC)        Acute cough        Relevant Medications    albuterol (Ventolin HFA) 90 mcg/actuation inhaler    dyclonine (Sucrets Sore Throat) 2 mg lozenge          History and physical examination as above.  Patient presenting for hospital follow-up after being admitted for COVID-19 with acute hypoxic respiratory failure in addition to acute kidney injury.  Patient has been doing well at home on room air.  She does have a pulse ox device at home and this has been reading in the 90s.  She denies any difficulty breathing.  Patient does not appear to be in any distress during virtual visit.  Patient is continuing her Medrol Dosepak as prescribed.  Patient has been staying well-hydrated and sleeping well.  Refilled patient's albuterol inhaler.  Patient given medication for her cough.  Discussed signs and symptoms that would require immediate attention in the emergency department versus reevaluation in the office.  Patient is understanding and in agreement with this plan.

## 2023-08-15 ENCOUNTER — TELEPHONE (OUTPATIENT)
Dept: PRIMARY CARE | Facility: CLINIC | Age: 58
End: 2023-08-15
Payer: MEDICARE

## 2023-08-16 ENCOUNTER — PATIENT OUTREACH (OUTPATIENT)
Dept: CARE COORDINATION | Facility: CLINIC | Age: 58
End: 2023-08-16
Payer: MEDICARE

## 2023-08-16 DIAGNOSIS — U07.1 COVID: ICD-10-CM

## 2023-08-16 DIAGNOSIS — E53.8 VITAMIN B12 DEFICIENCY: ICD-10-CM

## 2023-08-16 RX ORDER — ONDANSETRON 4 MG/1
4 TABLET, ORALLY DISINTEGRATING ORAL EVERY 8 HOURS PRN
Qty: 20 TABLET | Refills: 0 | Status: SHIPPED | OUTPATIENT
Start: 2023-08-16 | End: 2023-08-23

## 2023-08-16 RX ORDER — LANOLIN ALCOHOL/MO/W.PET/CERES
1000 CREAM (GRAM) TOPICAL DAILY
Qty: 90 TABLET | Refills: 1 | Status: SHIPPED | OUTPATIENT
Start: 2023-08-16 | End: 2023-09-12 | Stop reason: SDUPTHER

## 2023-08-16 NOTE — TELEPHONE ENCOUNTER
----- Message from Court Ny LPN sent at 8/16/2023 11:04 AM EDT -----  Regarding: med refill  Patient is requesting a refill of B12 and Zofran be sent to the [facility] on file.

## 2023-08-16 NOTE — PROGRESS NOTES
Call regarding appt. with PCP on 8/11/23 after hospitalization.  At time of outreach call the patient feels as if their condition has improved since last visit.  Reviewed the PCP appointment with the pt and addressed any questions or concerns.

## 2023-09-12 ENCOUNTER — TELEPHONE (OUTPATIENT)
Dept: PRIMARY CARE | Facility: CLINIC | Age: 58
End: 2023-09-12
Payer: MEDICARE

## 2023-09-12 DIAGNOSIS — E55.9 VITAMIN D DEFICIENCY: Primary | ICD-10-CM

## 2023-09-12 DIAGNOSIS — E53.8 VITAMIN B12 DEFICIENCY: ICD-10-CM

## 2023-09-12 DIAGNOSIS — F32.A DEPRESSION, UNSPECIFIED DEPRESSION TYPE: ICD-10-CM

## 2023-09-12 DIAGNOSIS — K21.9 GASTROESOPHAGEAL REFLUX DISEASE, UNSPECIFIED WHETHER ESOPHAGITIS PRESENT: ICD-10-CM

## 2023-09-12 DIAGNOSIS — E78.1 HYPERTRIGLYCERIDEMIA: ICD-10-CM

## 2023-09-12 DIAGNOSIS — G47.00 INSOMNIA, UNSPECIFIED TYPE: ICD-10-CM

## 2023-09-12 DIAGNOSIS — F41.9 ANXIETY: ICD-10-CM

## 2023-09-12 DIAGNOSIS — G45.8 SUBCLAVIAN STEAL SYNDROME: ICD-10-CM

## 2023-09-12 RX ORDER — ERGOCALCIFEROL 1.25 MG/1
1 CAPSULE ORAL WEEKLY
COMMUNITY
Start: 2023-06-06 | End: 2023-09-12 | Stop reason: SDUPTHER

## 2023-09-12 NOTE — TELEPHONE ENCOUNTER
Patient called to request refills on:    Vitamin D2 - 50k unites  escitalopram (Lexapro) 5 mg tablet  (90 days if can with refills)  pantoprazole (ProtoNix) 40 mg EC tablet   clopidogrel (Plavix) 75 mg tablet (90)  atorvastatin (Lipitor) 20 mg tablet   cyanocobalamin (Vitamin B-12) 1,000 mcg  traZODone (Desyrel) 50 mg tablet       Send all to [facility] on [address] in [place]

## 2023-09-13 RX ORDER — TRAZODONE HYDROCHLORIDE 50 MG/1
50 TABLET ORAL NIGHTLY PRN
Qty: 90 TABLET | Refills: 1 | Status: SHIPPED | OUTPATIENT
Start: 2023-09-13 | End: 2024-03-11

## 2023-09-13 RX ORDER — LANOLIN ALCOHOL/MO/W.PET/CERES
1000 CREAM (GRAM) TOPICAL DAILY
Qty: 90 TABLET | Refills: 1 | Status: SHIPPED | OUTPATIENT
Start: 2023-09-13 | End: 2023-10-30 | Stop reason: SDUPTHER

## 2023-09-13 RX ORDER — ESCITALOPRAM OXALATE 5 MG/1
5 TABLET ORAL DAILY
Qty: 90 TABLET | Refills: 1 | Status: SHIPPED | OUTPATIENT
Start: 2023-09-13 | End: 2023-10-30 | Stop reason: SDUPTHER

## 2023-09-13 RX ORDER — ATORVASTATIN CALCIUM 20 MG/1
20 TABLET, FILM COATED ORAL DAILY
Qty: 90 TABLET | Refills: 1 | Status: SHIPPED | OUTPATIENT
Start: 2023-09-13 | End: 2024-03-01 | Stop reason: SDUPTHER

## 2023-09-13 RX ORDER — ERGOCALCIFEROL 1.25 MG/1
1 CAPSULE ORAL
Qty: 12 CAPSULE | Refills: 0 | Status: SHIPPED | OUTPATIENT
Start: 2023-09-13 | End: 2023-10-30 | Stop reason: SDUPTHER

## 2023-09-13 RX ORDER — CLOPIDOGREL BISULFATE 75 MG/1
75 TABLET ORAL DAILY
Qty: 90 TABLET | Refills: 0 | Status: SHIPPED | OUTPATIENT
Start: 2023-09-13 | End: 2023-10-30 | Stop reason: SDUPTHER

## 2023-09-13 RX ORDER — PANTOPRAZOLE SODIUM 40 MG/1
40 TABLET, DELAYED RELEASE ORAL
Qty: 90 TABLET | Refills: 1 | Status: SHIPPED | OUTPATIENT
Start: 2023-09-13 | End: 2024-03-01 | Stop reason: SDUPTHER

## 2023-09-22 ENCOUNTER — PATIENT OUTREACH (OUTPATIENT)
Dept: CARE COORDINATION | Facility: CLINIC | Age: 58
End: 2023-09-22
Payer: MEDICARE

## 2023-09-22 NOTE — PROGRESS NOTES
Call placed regarding one month post discharge follow up call.  At time of outreach call the patient feels as if their condition has improved since initial visit with PCP or specialist.  Questions or concerns regarding recovery period addressed at this time. Reviewed any PCP or specialists progress notes/labs/radiology reports if applicable and addressed any questions or concerns.

## 2023-10-10 DIAGNOSIS — E03.2 HYPOTHYROIDISM DUE TO MEDICATION: ICD-10-CM

## 2023-10-10 DIAGNOSIS — C34.90 PRIMARY MALIGNANT NEOPLASM OF LUNG METASTATIC TO OTHER SITE, UNSPECIFIED LATERALITY (MULTI): ICD-10-CM

## 2023-10-11 ENCOUNTER — LAB (OUTPATIENT)
Dept: LAB | Facility: LAB | Age: 58
End: 2023-10-11
Payer: MEDICARE

## 2023-10-19 ENCOUNTER — PATIENT OUTREACH (OUTPATIENT)
Dept: CARE COORDINATION | Facility: CLINIC | Age: 58
End: 2023-10-19
Payer: MEDICARE

## 2023-10-19 NOTE — PROGRESS NOTES
Call placed regarding 90 day post discharge follow up call.  At time of outreach call the patient feels as if their condition has improved since initial visit with PCP or specialist. No questions or concerns.

## 2023-10-20 ENCOUNTER — HOSPITAL ENCOUNTER (OUTPATIENT)
Dept: RADIOLOGY | Facility: HOSPITAL | Age: 58
Discharge: HOME | End: 2023-10-20
Payer: MEDICARE

## 2023-10-20 DIAGNOSIS — C34.31 MALIGNANT NEOPLASM OF LOWER LOBE, RIGHT BRONCHUS OR LUNG (MULTI): ICD-10-CM

## 2023-10-20 DIAGNOSIS — R91.8 OTHER NONSPECIFIC ABNORMAL FINDING OF LUNG FIELD: ICD-10-CM

## 2023-10-20 PROCEDURE — 85025 COMPLETE CBC W/AUTO DIFF WBC: CPT

## 2023-10-20 PROCEDURE — 74160 CT ABDOMEN W/CONTRAST: CPT

## 2023-10-20 PROCEDURE — 82533 TOTAL CORTISOL: CPT

## 2023-10-20 PROCEDURE — 2550000001 HC RX 255 CONTRASTS: Performed by: INTERNAL MEDICINE

## 2023-10-20 PROCEDURE — 84443 ASSAY THYROID STIM HORMONE: CPT

## 2023-10-20 PROCEDURE — 71260 CT THORAX DX C+: CPT | Performed by: STUDENT IN AN ORGANIZED HEALTH CARE EDUCATION/TRAINING PROGRAM

## 2023-10-20 PROCEDURE — 80053 COMPREHEN METABOLIC PANEL: CPT

## 2023-10-20 PROCEDURE — 71260 CT THORAX DX C+: CPT

## 2023-10-20 PROCEDURE — 82570 ASSAY OF URINE CREATININE: CPT

## 2023-10-20 PROCEDURE — 83735 ASSAY OF MAGNESIUM: CPT

## 2023-10-20 PROCEDURE — 84439 ASSAY OF FREE THYROXINE: CPT

## 2023-10-20 RX ADMIN — IOHEXOL 75 ML: 350 INJECTION, SOLUTION INTRAVENOUS at 11:19

## 2023-10-23 ENCOUNTER — TELEPHONE (OUTPATIENT)
Dept: PRIMARY CARE | Facility: CLINIC | Age: 58
End: 2023-10-23
Payer: MEDICARE

## 2023-10-23 NOTE — PROGRESS NOTES
Reached out and spoke with patient after coming back into the office from maternity leave. Patient has been doing well and keeping busy and reports her medications continue to be effective and she barely feels anxious any more. Patient did some traveling and went to go visit her brother, who is sick with lung cancer and is not doing well. Patient was glad to see and engage with him at least one more time if something happens and she was happy to see family as well. Patient wants to take a break due to the upcoming holidays and re-touch base in the new year. Patient was told to call if something comes up or needs a session before then.

## 2023-10-25 ASSESSMENT — PATIENT HEALTH QUESTIONNAIRE - PHQ9
6. FEELING BAD ABOUT YOURSELF - OR THAT YOU ARE A FAILURE OR HAVE LET YOURSELF OR YOUR FAMILY DOWN: 0
6. FEELING BAD ABOUT YOURSELF - OR THAT YOU ARE A FAILURE OR HAVE LET YOURSELF OR YOUR FAMILY DOWN: NOT AT ALL
8. MOVING OR SPEAKING SO SLOWLY THAT OTHER PEOPLE COULD HAVE NOTICED. OR THE OPPOSITE, BEING SO FIGETY OR RESTLESS THAT YOU HAVE BEEN MOVING AROUND A LOT MORE THAN USUAL: NOT AT ALL
SUM OF ALL RESPONSES TO PHQ QUESTIONS 1-9: 2
2. FEELING DOWN, DEPRESSED, IRRITABLE, OR HOPELESS: 1
4. FEELING TIRED OR HAVING LITTLE ENERGY: 0
3. TROUBLE FALLING OR STAYING ASLEEP OR SLEEPING TOO MUCH: 1
2. FEELING DOWN, DEPRESSED, IRRITABLE, OR HOPELESS: SEVERAL DAYS
7. TROUBLE CONCENTRATING ON THINGS, SUCH AS READING THE NEWSPAPER OR WATCHING TELEVISION: NOT AT ALL
7. TROUBLE CONCENTRATING ON THINGS, SUCH AS READING THE NEWSPAPER OR WATCHING TELEVISION: NOT AT ALL
1. LITTLE INTEREST OR PLEASURE IN DOING THINGS: NOT AT ALL
10. IF YOU CHECKED OFF ANY PROBLEMS, HOW DIFFICULT HAVE THESE PROBLEMS MADE IT FOR YOU TO DO YOUR WORK, TAKE CARE OF THINGS AT HOME, OR GET ALONG WITH OTHER PEOPLE: NOT DIFFICULT AT ALL
9. THOUGHTS THAT YOU WOULD BE BETTER OFF DEAD, OR OF HURTING YOURSELF: 0
3. TROUBLE FALLING OR STAYING ASLEEP OR SLEEPING TOO MUCH: SEVERAL DAYS
5. POOR APPETITE OR OVEREATING: 0
10. IF YOU CHECKED OFF ANY PROBLEMS, HOW DIFFICULT HAVE THESE PROBLEMS MADE IT FOR YOU TO DO YOUR WORK, TAKE CARE OF THINGS AT HOME, OR GET ALONG WITH OTHER PEOPLE: NOT DIFFICULT AT ALL
6. FEELING BAD ABOUT YOURSELF - OR THAT YOU ARE A FAILURE OR HAVE LET YOURSELF OR YOUR FAMILY DOWN: NOT AT ALL
5. POOR APPETITE OR OVEREATING: NOT AT ALL
3. TROUBLE FALLING OR STAYING ASLEEP OR SLEEPING TOO MUCH: SEVERAL DAYS
8. MOVING OR SPEAKING SO SLOWLY THAT OTHER PEOPLE COULD HAVE NOTICED. OR THE OPPOSITE, BEING SO FIGETY OR RESTLESS THAT YOU HAVE BEEN MOVING AROUND A LOT MORE THAN USUAL: NOT AT ALL
2. FEELING DOWN, DEPRESSED OR HOPELESS: SEVERAL DAYS
SUM OF ALL RESPONSES TO PHQ QUESTIONS 1-9: 2
9. THOUGHTS THAT YOU WOULD BE BETTER OFF DEAD, OR OF HURTING YOURSELF: NOT AT ALL
5. POOR APPETITE OR OVEREATING: NOT AT ALL
1. LITTLE INTEREST OR PLEASURE IN DOING THINGS: NOT AT ALL
1. LITTLE INTEREST OR PLEASURE IN DOING THINGS: 0
4. FEELING TIRED OR HAVING LITTLE ENERGY: NOT AT ALL
4. FEELING TIRED OR HAVING LITTLE ENERGY: NOT AT ALL
9. THOUGHTS THAT YOU WOULD BE BETTER OFF DEAD, OR OF HURTING YOURSELF: NOT AT ALL
8. MOVING OR SPEAKING SO SLOWLY THAT OTHER PEOPLE COULD HAVE NOTICED. OR THE OPPOSITE, BEING SO FIGETY OR RESTLESS THAT YOU HAVE BEEN MOVING AROUND A LOT MORE THAN USUAL: 0
7. TROUBLE CONCENTRATING ON THINGS, SUCH AS READING THE NEWSPAPER OR WATCHING TELEVISION: 0

## 2023-10-28 ASSESSMENT — ENCOUNTER SYMPTOMS
LOSS OF MOTION: 0
LOSS OF SENSATION: 0
MUSCLE WEAKNESS: 0
INABILITY TO BEAR WEIGHT: 0
TINGLING: 0

## 2023-10-30 ENCOUNTER — OFFICE VISIT (OUTPATIENT)
Dept: PRIMARY CARE | Facility: CLINIC | Age: 58
End: 2023-10-30
Payer: MEDICARE

## 2023-10-30 VITALS
SYSTOLIC BLOOD PRESSURE: 112 MMHG | DIASTOLIC BLOOD PRESSURE: 69 MMHG | HEART RATE: 73 BPM | BODY MASS INDEX: 28.93 KG/M2 | OXYGEN SATURATION: 95 % | WEIGHT: 180 LBS | TEMPERATURE: 96.8 F | HEIGHT: 66 IN

## 2023-10-30 DIAGNOSIS — E53.8 VITAMIN B12 DEFICIENCY: ICD-10-CM

## 2023-10-30 DIAGNOSIS — F32.A DEPRESSION, UNSPECIFIED DEPRESSION TYPE: ICD-10-CM
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
## 2023-10-30 DIAGNOSIS — G45.8 SUBCLAVIAN STEAL SYNDROME: ICD-10-CM

## 2023-10-30 DIAGNOSIS — E55.9 VITAMIN D DEFICIENCY: ICD-10-CM

## 2023-10-30 DIAGNOSIS — F41.9 ANXIETY: ICD-10-CM

## 2023-10-30 PROCEDURE — 3074F SYST BP LT 130 MM HG: CPT | Performed by: STUDENT IN AN ORGANIZED HEALTH CARE EDUCATION/TRAINING PROGRAM

## 2023-10-30 PROCEDURE — 3078F DIAST BP <80 MM HG: CPT | Performed by: STUDENT IN AN ORGANIZED HEALTH CARE EDUCATION/TRAINING PROGRAM

## 2023-10-30 PROCEDURE — 3044F HG A1C LEVEL LT 7.0%: CPT | Performed by: STUDENT IN AN ORGANIZED HEALTH CARE EDUCATION/TRAINING PROGRAM

## 2023-10-30 PROCEDURE — 3066F NEPHROPATHY DOC TX: CPT | Performed by: STUDENT IN AN ORGANIZED HEALTH CARE EDUCATION/TRAINING PROGRAM

## 2023-10-30 PROCEDURE — 1036F TOBACCO NON-USER: CPT | Performed by: STUDENT IN AN ORGANIZED HEALTH CARE EDUCATION/TRAINING PROGRAM

## 2023-10-30 PROCEDURE — 15853 REMOVAL SUTR/STAPL XREQ ANES: CPT | Performed by: STUDENT IN AN ORGANIZED HEALTH CARE EDUCATION/TRAINING PROGRAM

## 2023-10-30 PROCEDURE — 99213 OFFICE O/P EST LOW 20 MIN: CPT | Performed by: STUDENT IN AN ORGANIZED HEALTH CARE EDUCATION/TRAINING PROGRAM

## 2023-10-30 PROCEDURE — 3060F POS MICROALBUMINURIA REV: CPT | Performed by: STUDENT IN AN ORGANIZED HEALTH CARE EDUCATION/TRAINING PROGRAM

## 2023-10-30 RX ORDER — LANOLIN ALCOHOL/MO/W.PET/CERES
1000 CREAM (GRAM) TOPICAL DAILY
Qty: 90 TABLET | Refills: 1 | Status: SHIPPED | OUTPATIENT
Start: 2023-10-30 | End: 2024-03-01 | Stop reason: SDUPTHER

## 2023-10-30 RX ORDER — ERGOCALCIFEROL 1.25 MG/1
1 CAPSULE ORAL
Qty: 12 CAPSULE | Refills: 0 | Status: SHIPPED | OUTPATIENT
Start: 2023-10-30 | End: 2024-01-22

## 2023-10-30 RX ORDER — CLOPIDOGREL BISULFATE 75 MG/1
75 TABLET ORAL DAILY
Qty: 90 TABLET | Refills: 0 | Status: SHIPPED | OUTPATIENT
Start: 2023-10-30 | End: 2023-12-07 | Stop reason: SDUPTHER

## 2023-10-30 RX ORDER — MUPIROCIN 20 MG/G
OINTMENT TOPICAL 2 TIMES DAILY PRN
Qty: 30 G | Refills: 1 | Status: SHIPPED | OUTPATIENT
Start: 2023-10-30 | End: 2023-11-09

## 2023-10-30 RX ORDER — ESCITALOPRAM OXALATE 5 MG/1
5 TABLET ORAL DAILY
Qty: 90 TABLET | Refills: 1 | Status: SHIPPED | OUTPATIENT
Start: 2023-10-30 | End: 2024-04-27

## 2023-10-30 ASSESSMENT — ENCOUNTER SYMPTOMS
LIGHT-HEADEDNESS: 0
WOUND: 1
ARTHRALGIAS: 0
NUMBNESS: 0
FEVER: 0
MYALGIAS: 0
ABDOMINAL PAIN: 0
DIZZINESS: 0
SHORTNESS OF BREATH: 0
CHILLS: 0
FATIGUE: 0

## 2023-10-30 NOTE — PROGRESS NOTES
"Subjective   Patient ID: Betsy Sams is a 57 y.o. female who presents for Suture / Staple Removal.    Lower Extremity Issue  Pertinent negatives include no abdominal pain, arthralgias, chest pain, chills, fatigue, fever, myalgias or numbness. Nothing aggravates the symptoms.      Answers submitted by the patient for this visit:  Lower Extremity Injury Questionnaire (Submitted on 10/28/2023)  Chief Complaint: Lower extremity pain  Incident occurred: more than 1 week ago  Incident location: at home  Injury mechanism: other  Pain location: left ankle  Pain quality: aching  Pain - numeric: 1/10  Pain course: improving  tingling: No  inability to bear weight: No  loss of motion: No  loss of sensation: No  muscle weakness: No  Foreign body present: no foreign bodies    Dog bite was her own dog.  She states that they are up-to-date with all of their shots.    The ER did a partial close of the laceration on the left medial ankle.  She got a prescription for Augmentin for a total of 3 days. She finished that about 6 days ago.  Patient denies any signs or symptoms of infection.  She states that overall she has been doing well.  She does need refills of some of her medications.      Review of Systems   Constitutional:  Negative for chills, fatigue and fever.   Respiratory:  Negative for shortness of breath.    Cardiovascular:  Negative for chest pain.   Gastrointestinal:  Negative for abdominal pain.   Musculoskeletal:  Negative for arthralgias and myalgias.   Skin:  Positive for wound (left lower medial leg laceration).   Neurological:  Negative for dizziness, light-headedness and numbness.       Objective   /69   Pulse 73   Temp 36 °C (96.8 °F)   Ht 1.676 m (5' 6\")   Wt 81.6 kg (180 lb)   SpO2 95%   BMI 29.05 kg/m²     Physical Exam  Vitals and nursing note reviewed.   Constitutional:       General: She is not in acute distress.     Appearance: Normal appearance. She is normal weight. She is not ill-appearing " or toxic-appearing.   Cardiovascular:      Rate and Rhythm: Normal rate and regular rhythm.      Heart sounds: Normal heart sounds.   Pulmonary:      Effort: Pulmonary effort is normal.      Breath sounds: Normal breath sounds.   Skin:     General: Skin is warm and dry.      Findings: Lesion (Left lower medial leg laceration.  No signs of active infection or spreading erythema.  No drainage or discharge.  Approximated.) present.   Neurological:      Mental Status: She is alert.       Patient ID: Betsy Sams is a 57 y.o. female.    Suture Removal    Date/Time: 10/30/2023 2:49 PM    Performed by: Bahman Montiel DO  Authorized by: Bahman Montiel DO    Consent:     Consent obtained:  Verbal    Consent given by:  Patient    Risks, benefits, and alternatives were discussed: yes      Risks discussed:  Bleeding, pain and wound separation    Alternatives discussed:  Delayed treatment  Location:     Location:  Lower extremity    Lower extremity location:  Leg    Leg location:  L lower leg  Procedure details:     Wound appearance:  No signs of infection, good wound healing, clean, pink and moist    Number of sutures removed:  2  Post-procedure details:     Post-removal:  Antibiotic ointment applied    Procedure completion:  Tolerated well, no immediate complications      Assessment/Plan   Problem List Items Addressed This Visit             ICD-10-CM    Anxiety F41.9    Relevant Medications    escitalopram (Lexapro) 5 mg tablet    Depression F32.A    Relevant Medications    escitalopram (Lexapro) 5 mg tablet    Subclavian steal syndrome G45.8    Relevant Medications    clopidogrel (Plavix) 75 mg tablet    Vitamin D deficiency E55.9    Relevant Medications    ergocalciferol (Vitamin D-2) 1.25 MG (19939 UT) capsule    Vitamin B12 deficiency E53.8    Relevant Medications    cyanocobalamin (Vitamin B-12) 1,000 mcg tablet     Other Visit Diagnoses         Codes    Dog bite, subsequent encounter    -  Primary W54.0XXD           History and physical examination as above.  Patient presenting for ER follow-up for suture removal.  Patient states that she did get a bite from her own dog.  Did given her antibiotic treatment and placed loose sutures.  2 sutures have been placed.  Exam as above.  Consent given by the patient for suture removal.  2 sutures removed without issue.  Patient tolerated well.  Cover with antibiotic ointment.  Discussed to watch for signs of infection.  Discussed additional diagnoses.  Refills given for her medications.  She will continue with regular follow-up.  Plan on next appointment in January.  She will come in sooner for other acute concerns or complaints.

## 2023-11-01 ENCOUNTER — TELEMEDICINE (OUTPATIENT)
Dept: HEMATOLOGY/ONCOLOGY | Facility: CLINIC | Age: 58
End: 2023-11-01
Payer: MEDICARE

## 2023-11-01 DIAGNOSIS — C34.31 PRIMARY CANCER OF RIGHT LOWER LOBE OF LUNG (MULTI): Primary | ICD-10-CM

## 2023-11-01 PROCEDURE — 99215 OFFICE O/P EST HI 40 MIN: CPT | Mod: PO,95 | Performed by: INTERNAL MEDICINE

## 2023-11-01 PROCEDURE — 99215 OFFICE O/P EST HI 40 MIN: CPT | Performed by: INTERNAL MEDICINE

## 2023-12-07 ENCOUNTER — TELEPHONE (OUTPATIENT)
Dept: PRIMARY CARE | Facility: CLINIC | Age: 58
End: 2023-12-07
Payer: MEDICARE

## 2023-12-07 DIAGNOSIS — G45.8 SUBCLAVIAN STEAL SYNDROME: ICD-10-CM

## 2023-12-07 DIAGNOSIS — E11.65 TYPE 2 DIABETES MELLITUS WITH HYPERGLYCEMIA, WITHOUT LONG-TERM CURRENT USE OF INSULIN (MULTI): ICD-10-CM

## 2023-12-07 DIAGNOSIS — R11.0 NAUSEA: Primary | ICD-10-CM

## 2023-12-07 DIAGNOSIS — E78.1 HYPERTRIGLYCERIDEMIA: ICD-10-CM

## 2023-12-07 RX ORDER — CLOPIDOGREL BISULFATE 75 MG/1
75 TABLET ORAL DAILY
Qty: 90 TABLET | Refills: 1 | Status: SHIPPED | OUTPATIENT
Start: 2023-12-07 | End: 2024-03-01 | Stop reason: SDUPTHER

## 2023-12-07 RX ORDER — ONDANSETRON 4 MG/1
4 TABLET, ORALLY DISINTEGRATING ORAL EVERY 8 HOURS PRN
Qty: 30 TABLET | Refills: 0 | Status: SHIPPED | OUTPATIENT
Start: 2023-12-07 | End: 2024-03-01 | Stop reason: SDUPTHER

## 2023-12-07 NOTE — TELEPHONE ENCOUNTER
1) [facility] [phone]    Ondansetron 4 mg dissolvable tablets  Clopidrogrel 75mg     2) patient also requesting bw order for an A1C and a lipid panel; pt states she has an upcoming appt with you in January.    Questions call pt at [phone]

## 2023-12-08 DIAGNOSIS — C34.90 MALIGNANT NEOPLASM OF LUNG, UNSPECIFIED LATERALITY, UNSPECIFIED PART OF LUNG (MULTI): ICD-10-CM

## 2024-01-12 DIAGNOSIS — C34.90 MALIGNANT NEOPLASM OF LUNG, UNSPECIFIED LATERALITY, UNSPECIFIED PART OF LUNG (MULTI): ICD-10-CM

## 2024-01-12 DIAGNOSIS — E08.8 DIABETES DUE TO UNDERLYING CONDITION W UNSP COMPLICATIONS (MULTI): ICD-10-CM

## 2024-01-16 ENCOUNTER — TELEPHONE (OUTPATIENT)
Dept: ADMISSION | Facility: HOSPITAL | Age: 59
End: 2024-01-16
Payer: MEDICARE

## 2024-01-16 NOTE — TELEPHONE ENCOUNTER
Pt is scheduled for CT chest and abdomen on 1/19.  CT dept inquires if CT pelvis should also be ordered?

## 2024-01-17 ENCOUNTER — LAB (OUTPATIENT)
Dept: LAB | Facility: LAB | Age: 59
End: 2024-01-17
Payer: MEDICARE

## 2024-01-17 ENCOUNTER — HOSPITAL ENCOUNTER (OUTPATIENT)
Dept: RADIOLOGY | Facility: CLINIC | Age: 59
Discharge: HOME | End: 2024-01-17
Payer: MEDICARE

## 2024-01-17 DIAGNOSIS — C34.90 MALIGNANT NEOPLASM OF LUNG, UNSPECIFIED LATERALITY, UNSPECIFIED PART OF LUNG (MULTI): ICD-10-CM

## 2024-01-17 DIAGNOSIS — C34.31 MALIGNANT NEOPLASM OF LOWER LOBE, RIGHT BRONCHUS OR LUNG (MULTI): ICD-10-CM

## 2024-01-17 DIAGNOSIS — E78.1 HYPERTRIGLYCERIDEMIA: ICD-10-CM

## 2024-01-17 DIAGNOSIS — C34.90 MALIGNANT NEOPLASM OF UNSPECIFIED PART OF UNSPECIFIED BRONCHUS OR LUNG (MULTI): ICD-10-CM

## 2024-01-17 DIAGNOSIS — E11.65 TYPE 2 DIABETES MELLITUS WITH HYPERGLYCEMIA, WITHOUT LONG-TERM CURRENT USE OF INSULIN (MULTI): ICD-10-CM

## 2024-01-17 DIAGNOSIS — E08.8 DIABETES DUE TO UNDERLYING CONDITION W UNSP COMPLICATIONS (MULTI): ICD-10-CM

## 2024-01-17 LAB
ALBUMIN SERPL BCP-MCNC: 4 G/DL (ref 3.4–5)
ALP SERPL-CCNC: 98 U/L (ref 33–110)
ALT SERPL W P-5'-P-CCNC: 40 U/L (ref 7–45)
ANION GAP SERPL CALC-SCNC: 13 MMOL/L (ref 10–20)
AST SERPL W P-5'-P-CCNC: 20 U/L (ref 9–39)
BASOPHILS # BLD AUTO: 0.04 X10*3/UL (ref 0–0.1)
BASOPHILS NFR BLD AUTO: 0.4 %
BILIRUB SERPL-MCNC: 0.7 MG/DL (ref 0–1.2)
BUN SERPL-MCNC: 15 MG/DL (ref 6–23)
CALCIUM SERPL-MCNC: 9.3 MG/DL (ref 8.6–10.3)
CHLORIDE SERPL-SCNC: 105 MMOL/L (ref 98–107)
CHOLEST SERPL-MCNC: 166 MG/DL (ref 0–199)
CHOLESTEROL/HDL RATIO: 4.1
CO2 SERPL-SCNC: 27 MMOL/L (ref 21–32)
CORTIS SERPL-MCNC: 0.9 UG/DL (ref 2.5–20)
CREAT SERPL-MCNC: 1.06 MG/DL (ref 0.5–1.05)
EGFRCR SERPLBLD CKD-EPI 2021: 61 ML/MIN/1.73M*2
EOSINOPHIL # BLD AUTO: 0.19 X10*3/UL (ref 0–0.7)
EOSINOPHIL NFR BLD AUTO: 2 %
ERYTHROCYTE [DISTWIDTH] IN BLOOD BY AUTOMATED COUNT: 12.7 % (ref 11.5–14.5)
EST. AVERAGE GLUCOSE BLD GHB EST-MCNC: 160 MG/DL
GLUCOSE SERPL-MCNC: 106 MG/DL (ref 74–99)
HBA1C MFR BLD: 7.2 %
HCT VFR BLD AUTO: 44.8 % (ref 36–46)
HDLC SERPL-MCNC: 40.5 MG/DL
HGB BLD-MCNC: 14.6 G/DL (ref 12–16)
IMM GRANULOCYTES # BLD AUTO: 0.06 X10*3/UL (ref 0–0.7)
IMM GRANULOCYTES NFR BLD AUTO: 0.6 % (ref 0–0.9)
LDLC SERPL CALC-MCNC: 74 MG/DL
LYMPHOCYTES # BLD AUTO: 4.06 X10*3/UL (ref 1.2–4.8)
LYMPHOCYTES NFR BLD AUTO: 43.2 %
MCH RBC QN AUTO: 30.4 PG (ref 26–34)
MCHC RBC AUTO-ENTMCNC: 32.6 G/DL (ref 32–36)
MCV RBC AUTO: 93 FL (ref 80–100)
MONOCYTES # BLD AUTO: 0.56 X10*3/UL (ref 0.1–1)
MONOCYTES NFR BLD AUTO: 6 %
NEUTROPHILS # BLD AUTO: 4.49 X10*3/UL (ref 1.2–7.7)
NEUTROPHILS NFR BLD AUTO: 47.8 %
NON HDL CHOLESTEROL: 126 MG/DL (ref 0–149)
NRBC BLD-RTO: 0 /100 WBCS (ref 0–0)
PLATELET # BLD AUTO: 305 X10*3/UL (ref 150–450)
POTASSIUM SERPL-SCNC: 4.1 MMOL/L (ref 3.5–5.3)
PROT SERPL-MCNC: 7 G/DL (ref 6.4–8.2)
RBC # BLD AUTO: 4.81 X10*6/UL (ref 4–5.2)
SODIUM SERPL-SCNC: 141 MMOL/L (ref 136–145)
T4 FREE SERPL-MCNC: 1.02 NG/DL (ref 0.61–1.12)
TRIGL SERPL-MCNC: 257 MG/DL (ref 0–149)
TSH SERPL-ACNC: 0.82 MIU/L (ref 0.44–3.98)
VLDL: 51 MG/DL (ref 0–40)
WBC # BLD AUTO: 9.4 X10*3/UL (ref 4.4–11.3)

## 2024-01-17 PROCEDURE — 80061 LIPID PANEL: CPT

## 2024-01-17 PROCEDURE — 70553 MRI BRAIN STEM W/O & W/DYE: CPT | Performed by: RADIOLOGY

## 2024-01-17 PROCEDURE — 2550000001 HC RX 255 CONTRASTS: Performed by: NURSE PRACTITIONER

## 2024-01-17 PROCEDURE — 84443 ASSAY THYROID STIM HORMONE: CPT

## 2024-01-17 PROCEDURE — 83036 HEMOGLOBIN GLYCOSYLATED A1C: CPT

## 2024-01-17 PROCEDURE — 80053 COMPREHEN METABOLIC PANEL: CPT

## 2024-01-17 PROCEDURE — 82533 TOTAL CORTISOL: CPT

## 2024-01-17 PROCEDURE — A9575 INJ GADOTERATE MEGLUMI 0.1ML: HCPCS | Performed by: NURSE PRACTITIONER

## 2024-01-17 PROCEDURE — 85025 COMPLETE CBC W/AUTO DIFF WBC: CPT

## 2024-01-17 PROCEDURE — 36415 COLL VENOUS BLD VENIPUNCTURE: CPT

## 2024-01-17 PROCEDURE — 84439 ASSAY OF FREE THYROXINE: CPT

## 2024-01-17 PROCEDURE — 70553 MRI BRAIN STEM W/O & W/DYE: CPT

## 2024-01-17 PROCEDURE — 82024 ASSAY OF ACTH: CPT

## 2024-01-17 RX ORDER — GADOTERATE MEGLUMINE 376.9 MG/ML
0.2 INJECTION INTRAVENOUS
Status: COMPLETED | OUTPATIENT
Start: 2024-01-17 | End: 2024-01-17

## 2024-01-17 RX ADMIN — GADOTERATE MEGLUMINE 16 ML: 376.9 INJECTION INTRAVENOUS at 09:22

## 2024-01-18 ENCOUNTER — HOSPITAL ENCOUNTER (OUTPATIENT)
Dept: RADIATION ONCOLOGY | Facility: HOSPITAL | Age: 59
Setting detail: RADIATION/ONCOLOGY SERIES
Discharge: HOME | End: 2024-01-18
Payer: MEDICARE

## 2024-01-18 DIAGNOSIS — C34.90 LUNG CANCER METASTATIC TO BRAIN (MULTI): Primary | ICD-10-CM

## 2024-01-18 DIAGNOSIS — C79.31 LUNG CANCER METASTATIC TO BRAIN (MULTI): Primary | ICD-10-CM

## 2024-01-18 PROCEDURE — 99214 OFFICE O/P EST MOD 30 MIN: CPT | Performed by: NURSE PRACTITIONER

## 2024-01-18 NOTE — PROGRESS NOTES
Radiation Oncology Follow-Up    Patient Name:  Betsy Sams  MRN:  [id number]  :  1965    Referring Provider: No ref. 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 She was treated with GK SRS on 2021 to a right occipital lesion consisting of a dose of 20 Gy.   Single agent pembrolizumab under EA [id number] initiated 03/10/21.     SUBJECTIVE  History of Present Illness:   Telehealth visit with Betsy today for routine follow up s/p GK SRS 2021.  She says she is doing well and completed 2 yrs of  immunotherapy in 2023 and continues responding to treatment. She has staging CT scans next week. She says she feels well and managing all of her usual ADLs. She had MRI of brain done yesterday and there is some mild enhancement in the treated occipital regions - tx effect vs possible progression?  She denies headaches, seizures or any recent falls.  No cough, SOB, chest pain, GI complaints or bony pain. Depression seems to be managed well with current medications.      Review of Systems:    Review of Systems   All other systems reviewed and are negative.       OBJECTIVE    Current Outpatient Medications:     albuterol (Ventolin HFA) 90 mcg/actuation inhaler, Inhale 2 puffs every 4 hours if needed for wheezing or shortness of breath., Disp: 18 g, Rfl: 1    alcohol swabs pads, medicated, use as directed once daily, Disp: , Rfl:     aspirin 81 mg chewable tablet, Chew 1 tablet (81 mg) once daily., Disp: , Rfl:     atorvastatin (Lipitor) 20 mg  "tablet, Take 1 tablet (20 mg) by mouth once daily., Disp: 90 tablet, Rfl: 1    BD Luer-Haily Syringe 3 mL 25 gauge x 1\" syringe, use as directed, Disp: , Rfl:     clopidogrel (Plavix) 75 mg tablet, Take 1 tablet (75 mg) by mouth once daily., Disp: 90 tablet, Rfl: 1    cyanocobalamin (Vitamin B-12) 1,000 mcg tablet, Take 1 tablet (1,000 mcg) by mouth once daily., Disp: 90 tablet, Rfl: 1    dexAMETHasone (Decadron) 4 mg/mL injection, INJECT 4MG IN CASE OF EMERGENCY, Disp: , Rfl:     dyclonine (Sucrets Sore Throat) 2 mg lozenge, Place 1 lozenge into mouth between cheek and gum every 3 hours if needed (cough)., Disp: 18 lozenge, Rfl: 1    ergocalciferol (Vitamin D-2) 1.25 MG (80003 UT) capsule, Take 1 capsule (1,250 mcg) by mouth 1 (one) time per week., Disp: 12 capsule, Rfl: 0    escitalopram (Lexapro) 5 mg tablet, Take 1 tablet (5 mg) by mouth once daily., Disp: 90 tablet, Rfl: 1    famotidine (Pepcid) 20 mg tablet, Take 1 tablet (20 mg) by mouth 2 times a day., Disp: 60 tablet, Rfl: 5    hydrocortisone (Cortef) 10 mg tablet, take 2 tablets by mouth AT 8 AM, THEN 2 TABLETS AT 2 PM AND 1/2 TABLET AT BEDTIME, Disp: , Rfl:     hydrocortisone 2.5 % cream, Apply topically 2 times a day as needed for irritation or rash., Disp: 28 g, Rfl: 0    ipratropium (Atrovent) 21 mcg (0.03 %) nasal spray, Administer 2 sprays into each nostril. 2-3 times daily, Disp: , Rfl:     levothyroxine (Synthroid, Levoxyl) 150 mcg tablet, Take 1 tablet (150 mcg) by mouth every other day. (ALTERNATE WITH 175 MCG), Disp: , Rfl:     levothyroxine (Synthroid, Levoxyl) 175 mcg tablet, Take 1 tablet (175 mcg) by mouth every other day. (ALTERNATE WITH 150 MG), Disp: , Rfl:     metFORMIN (Glucophage) 500 mg tablet, Take 1 tablet (500 mg) by mouth once daily. Take with food., Disp: , Rfl:     Microlet Lancet misc, use 1 LANCET to TEST BLOOD SUGAR once daily as directed, Disp: , Rfl:     omega-3 acid ethyl esters (Lovaza) 1 gram capsule, Take 2 capsules (2 g) " by mouth 2 times a day with meals., Disp: , Rfl:     ondansetron ODT (Zofran-ODT) 4 mg disintegrating tablet, Take 1 tablet (4 mg) by mouth every 8 hours if needed for nausea or vomiting., Disp: 30 tablet, Rfl: 0    OneTouch Ultra Test strip, use 1 TEST STRIP to TEST BLOOD SUGAR once daily, Disp: , Rfl:     pantoprazole (ProtoNix) 40 mg EC tablet, Take 1 tablet (40 mg) by mouth once daily in the morning. Take before meals. 30 MINUTES PRIOR TO BREAKFAST, Disp: 90 tablet, Rfl: 1    repaglinide (Prandin) 0.5 mg tablet, Take 1 tablet (0.5 mg) by mouth 3 times a day before meals., Disp: , Rfl:     traZODone (Desyrel) 50 mg tablet, Take 1 tablet (50 mg) by mouth as needed at bedtime for sleep., Disp: 90 tablet, Rfl: 1   Physical Exam:  Physical Exam        RESULTS:  MR brain w and wo IV contrast    Result Date: 1/17/2024  Interpreted By:  Jesse Saunders, STUDY: MR BRAIN W AND WO IV CONTRAST; ;  1/17/2024 9:32 am   INDICATION: Signs/Symptoms: Metastatic lung cancer to the brain s/p gamma knife SRS to a single lesion. Evaluate for progression  C34.31: Primary malignant neoplasm of right lower lobe of lung C34.90: Malignant neoplasm of lung metastatic to brain.   COMPARISON: MRI brain from 08/31/2023.   ACCESSION NUMBER(S): [id number]   ORDERING CLINICIAN: MYNRO SHAFFER   TECHNIQUE: MRI of the brain was performed with the acquisition of axial diffusion-weighted, axial T1, axial FLAIR, axial T2 gradient echo, axial T2 fat saturated, axial T1 fat saturated postcontrast sequence, and volumetric axial T1 post contrast sequence with multiplanar reformats.   Contrast: 15 mL of MultiHance was injected intravenously.   FINDINGS: There is a punctate focus susceptibility artifact located within the posterior right occipital lobe consistent with prior hemosiderin deposition within a treated metastasis. There is new enhancement surrounding this focus of signal abnormality measuring 5 mm (axial T1 volumetric post-contrast sequence  series 8 image 93 of 176). There is another punctate focus of enhancement located within the right occipital lobe just anterior and lateral to this aforementioned focus of enhancement (image 92). There is new slight FLAIR hyperintense signal located in this region of enhancement is nonspecific. There is no surrounding mass effect.   There is no abnormal intracranial enhancement or mass otherwise. There is no acute intracranial hemorrhage or infarct. There is no abnormal extra-axial fluid collection. There is stable mild downward migration of the cerebellar tonsils consistent with cerebellar tonsillar ectopia.   Ventricles, sulci, and basilar cisterns are unchanged in size, shape, and configuration. Visualized paranasal sinuses are essentially clear.   There is partial opacification of the right mastoid air cells with nonspecific fluid.   There is a 7 mm focus of enhancement located within the right occipital bone (sagittal postcontrast sequence series 11, image 31 of 85) which was not present on the prior MRI brain from 01/03/2023 but was present on the subsequent MRI brain studies. This focus of enhancement has slightly increased in size since the prior MRI from 08/31/2023 when it measured 5 mm.       1. Interval development of small amount of enhancement located within the right occipital lobe surrounding the treated occipital lobe metastasis which may reflect tumor recurrence.   2. Subcentimeter focus of enhancement located within the right occipital bone has slightly increased in size and is also suspicious for metastasis.   This study was interpreted at [facility].     MACRO: None   Signed by: Jesse Saunders 1/17/2024 10:29 AM Dictation workstation:   [id number]       ASSESSMENT/PLAN:  58 y.o. 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 We reviewed her brain MRI today which shows  treatment changes vs questionable recurrence and no other new lesion. She will be presented at CNS tumor board next week.  MRI in 2-3 mo.     - Continue follow up with endocrinology  - Continues re-staging CTs under care of Dr. Owen.     Aviva Jeffrey CNP  [phone]

## 2024-01-19 ENCOUNTER — HOSPITAL ENCOUNTER (OUTPATIENT)
Dept: RADIOLOGY | Facility: CLINIC | Age: 59
Discharge: HOME | End: 2024-01-19
Payer: MEDICARE

## 2024-01-19 DIAGNOSIS — C34.90 MALIGNANT NEOPLASM OF LUNG, UNSPECIFIED LATERALITY, UNSPECIFIED PART OF LUNG (MULTI): ICD-10-CM

## 2024-01-19 LAB — ACTH PLAS-MCNC: <1.5 PG/ML (ref 7.2–63.3)

## 2024-01-19 PROCEDURE — 71260 CT THORAX DX C+: CPT

## 2024-01-19 PROCEDURE — 71260 CT THORAX DX C+: CPT | Performed by: RADIOLOGY

## 2024-01-19 PROCEDURE — 2550000001 HC RX 255 CONTRASTS: Performed by: INTERNAL MEDICINE

## 2024-01-19 PROCEDURE — 74160 CT ABDOMEN W/CONTRAST: CPT | Performed by: RADIOLOGY

## 2024-01-19 PROCEDURE — 74160 CT ABDOMEN W/CONTRAST: CPT

## 2024-01-19 RX ADMIN — IOHEXOL 75 ML: 350 INJECTION, SOLUTION INTRAVENOUS at 08:45

## 2024-01-22 ASSESSMENT — ENCOUNTER SYMPTOMS
HUNGER: 1
NERVOUS/ANXIOUS: 0
TREMORS: 0
SEIZURES: 0
HEADACHES: 0
BLURRED VISION: 0
POLYPHAGIA: 1
DIZZINESS: 0
WEIGHT LOSS: 0
BLACKOUTS: 0
VISUAL CHANGE: 0
FATIGUE: 1
WEAKNESS: 0
POLYDIPSIA: 1
SPEECH DIFFICULTY: 0
SWEATS: 0
CONFUSION: 0

## 2024-01-23 ENCOUNTER — TELEPHONE (OUTPATIENT)
Dept: RADIATION ONCOLOGY | Facility: HOSPITAL | Age: 59
End: 2024-01-23
Payer: MEDICARE

## 2024-01-23 NOTE — TELEPHONE ENCOUNTER
Called pt to remind of appointment on 1/24/24 at 2:30 Pt confirmed appointment.     Jourdan Grant MA

## 2024-01-24 ENCOUNTER — OFFICE VISIT (OUTPATIENT)
Dept: HEMATOLOGY/ONCOLOGY | Facility: CLINIC | Age: 59
End: 2024-01-24
Payer: MEDICARE

## 2024-01-24 ENCOUNTER — TUMOR BOARD CONFERENCE (OUTPATIENT)
Dept: HEMATOLOGY/ONCOLOGY | Facility: HOSPITAL | Age: 59
End: 2024-01-24
Payer: MEDICARE

## 2024-01-24 ENCOUNTER — HOSPITAL ENCOUNTER (OUTPATIENT)
Dept: RADIATION ONCOLOGY | Facility: HOSPITAL | Age: 59
Setting detail: RADIATION/ONCOLOGY SERIES
End: 2024-01-24
Payer: MEDICARE

## 2024-01-24 VITALS
RESPIRATION RATE: 18 BRPM | BODY MASS INDEX: 28.93 KG/M2 | SYSTOLIC BLOOD PRESSURE: 109 MMHG | WEIGHT: 179.23 LBS | HEART RATE: 80 BPM | TEMPERATURE: 97.7 F | OXYGEN SATURATION: 95 % | DIASTOLIC BLOOD PRESSURE: 69 MMHG

## 2024-01-24 DIAGNOSIS — C34.90 LUNG CANCER METASTATIC TO BRAIN (MULTI): Primary | ICD-10-CM

## 2024-01-24 DIAGNOSIS — C79.31 LUNG CANCER METASTATIC TO BRAIN (MULTI): Primary | ICD-10-CM

## 2024-01-24 PROCEDURE — 3078F DIAST BP <80 MM HG: CPT | Performed by: INTERNAL MEDICINE

## 2024-01-24 PROCEDURE — 1036F TOBACCO NON-USER: CPT | Performed by: INTERNAL MEDICINE

## 2024-01-24 PROCEDURE — 3066F NEPHROPATHY DOC TX: CPT | Performed by: INTERNAL MEDICINE

## 2024-01-24 PROCEDURE — 3048F LDL-C <100 MG/DL: CPT | Performed by: INTERNAL MEDICINE

## 2024-01-24 PROCEDURE — 3051F HG A1C>EQUAL 7.0%<8.0%: CPT | Performed by: INTERNAL MEDICINE

## 2024-01-24 PROCEDURE — 99215 OFFICE O/P EST HI 40 MIN: CPT | Performed by: INTERNAL MEDICINE

## 2024-01-24 PROCEDURE — 3074F SYST BP LT 130 MM HG: CPT | Performed by: INTERNAL MEDICINE

## 2024-01-24 ASSESSMENT — PAIN SCALES - GENERAL: PAINLEVEL: 0-NO PAIN

## 2024-01-24 NOTE — ADDENDUM NOTE
Encounter addended by: Kate Jeffrey, SUHAS-ARPIT on: 1/24/2024 11:27 AM   Actions taken: Order list changed, Diagnosis association updated, Level of Service modified, Clinical Note Signed

## 2024-01-24 NOTE — PROGRESS NOTES
Patient ID: Betsy Sams is a 58 y.o. female    Primary Care Provider: Bahman Montiel DO    DIAGNOSIS AND STAGING  Stage HEAVEN ([id number]) NSCLC (adenocarcinoma with lepidic growth) of the RLL - dx on 12/9/2020       SITES OF DISEASE  Right lower lobe  Brain (right posterior occipital metastasis)  Concern for progression in omentum, status post biopsy on 12/14/2022 with no malignancy identified, chronic inflammatory changes noted    MOLECULAR GENOMICS  MICROSATELLITE STATUS: Microsatellite Stable (PELON)     DISEASE ASSOCIATED GENOMIC FINDINGS: None     DISEASE RELEVANT ALTERATIONS NOT DETECTED:  Negative for ALK fusion.  Negative for BRAF V600E.  Negative for EGFR (sensitizing) mutation.  Negative for NTRK fusion.  Negative for ROS1 fusion.  Negative for RET fusion.  Negative for MET exon 14 skipping mutation.     PD-L1 TPS 75%     PRIOR THERAPIES  GKRS to the right posterior occipital metastasis on 01/20/21 (20 Gy)  Single agent pembrolizumab under EA [id number] begins 03/10/21; last dose on 03/01/2023     CURRENT THERAPY  Observation    CURRENT ONCOLOGICAL PROBLEMS  Immune related adrenal insufficiency     HISTORY OF PRESENT ILLNESS  She initially presented to her PCP with hemoptysis that began in October, after which CT chest w/o contrast was taken on 10/30/2020 and showed a RLL mass of 4.4 x 2.8 x 3.1 cm with mass effect on regional bronchi. Scan positive also for multiple enlarged  ipsilateral mediastinal and paratracheal lymph nodes, largest subcarinal measuring 1.8 cm. PET/CT additionally showing SUV max of 20.1 in RLL mass, SUV max of 11.7 in  ipsilateral paratracheal and subcarinal LNs 2R and 4R. MRI brain done on  1/5/2020 showed a 12 mm enhancing lesion in posterior right occipital lobe with surrounding vasogenic edema.     CT guided biopsy of lung mass on 12/07/2020 confirmed adenocarcinoma. LN biopsy on 12/18/2020 showed node 4R and 7 were positive with malignant cells from  adenocarcinoma. 4L was  negative.     NGS resulted on 01/08/21: no actionable mutations - microsatellite stable - PD-L1 TPS unable to be obtained due to QNS     01/20/21: GKRS to the right occipital metastasis      03/01/21: PD-L1 TPS 75% - enrolls in [id number] - randomized to single agent pembrolizumab 1L      03/08/21: Brain MRI shows a new 5 mm L frontal lobe metastasis      03/10/21: begins single agent pembrolizumab under [id number]     03/31/21: C2D1 single agent pembrolizumab      04/19/21: Brain MRI: Imaging personally reviewed by me shows an interval decrease in size of right medial occipital mass now measuring 4 mm. Previously noted enhancing focus within the left  frontal lobe is no longer appreciated, apparently resolved.     04/20/21: CT C/A/P:  imaging personally reviewed by me:  The RLL mass has decreased in size -   There are inflammatory changes noted in that lobe -   No new lesions identified -      04/28/21: C3D1 pembrolizumab      05/19/21: C4D1 pembrolizumab      05/28/21: CT C/A/P shows  3.9 cm RLL mass image 202/317, 8 mm subcarinal node image 127/317 that correlates with 30 % or more decrease in diameter  consistent with partial response.      06/09/21: C5D1 pembrolizumab       08/09/21: CT C/A/P continues to have a SD - the RLL mass has decreased further in size (3.6 cm) as well as the level 7 node (7 mm)      08/11/21: C8D1 pembrolizumab      09/01/21: C9D1 pembrolizumab      10/11/21: CT C/A/P shows the RLL mass to measure 2.9 cm (image # 200/303). Level 7node measures 1.2 cm (#123/303). Right hilar node more prominent measuring 1.2 cm (#123/303). 4R 1.0 cm (#72/303).      11/03/21: C12 pembrolizumab under EA [id number]     11/11/21: MRI brain shows JOSE -      11/22/21: C13D1 pembrolizumab     12/13/21: CT C/A/P shows stable findings - the RLL mass measures 2.7 cm - the right paratracheal prominent node is slightly decreased in size      12/15/21: C14D1 pembrolizumab under [id number]     1/5/21: Grade 1 diarrhea irAE - starting  imodium for control. Continuing with C15D1 pembrolizumab today     2/14/2022: CT Chest/Abdomen/Pelvis shows a 2.6 cm RLL mass, down from 2.9 cm in Dec, sub carinal node measures 1.2 cm, prev 1 cm. right axiliary node has become more prominent, now measuring 1.2 cm. unclear if this represents PD (of note, this was never  target lesion) the sum of target lesions has gone down by 50% from baseline     2/18/2022: pt diagnosed with secondary adrenal insufficiency- prescribed hydrocortisone 20 mg in the morning, 10 mg in the evening, referred to endocrinology     02/25/2022: brain mri/sella turcica demonstrates normal sized pituitary gland, small lesion in right posterior occipital lobe, prev treated with no changes      05/03/2022: CT Chest/Abdomen/Pelvis shows decreases in size of RLL mass now measuring 2.2 cm, no evidence of emerging metastatic disease. At this point, pt has achieved a 59% decrease in the sum of all target lesions.      08/19/22: CT C/A/P shows stable changes with no new sites of metastasis      09/01/22: Brain MRI shows no signs of PD      11/11/2022: CT chest/abdomen/pelvis demonstrates soft tissue nodule measuring 1.6 cm in the greater omentum, concerning for peritoneal metastatic disease.  Other findings remain stable.      11/18/2022: PET scan demonstrates the soft tissue nodule in the right superior aspect of the greater omentum to have an SUV of 2.1, which could be inflammatory in nature. Bx requested      12/14/2022: Biopsy of the omental nodule demonstrates fibroadipose tissue with chronic inflammation and scarring, no malignancy identified.  No evidence of confirmed PD.  Patient remains on pembrolizumab.      01/03/2023: MRI brain with and without contrast demonstrates no intracranial metastasis     02/07/2023: CT chest/abdomen/pelvis demonstrates no signs of PD.  Right lower lobe primary lung cancer remains a stable in size.  The omental nodule that was biopsied in the past is fading away,  in concordance with pathology results showing adipose tissue  with inflammation.      2023: Completes 2 years of single agent pembrolizumab under EA [id number]      2020: Brain MRI with no remarkable findings.  No evidence of intracranial metastasis.     2023: CT chest/abdomen/pelvis demonstrates no signs of PD (60% decrease in sum of target lesions c/t baselien).    24: MRI brain demonstrating nodular enhancement in right posterior occipital lobe and adjacent calvarium    24: CT C/A/P shows no signs of PD      PAST MEDICAL HISTORY  GERD - taking pantoprazole 40mg daily  subclavian steal syndrome - aspirin 81mg daily, plavix 75mg daily  depression - taking buspirone 5mg once daily  COVID19 vaccination + status s/p booster    SURGICAL HISTORY  Lung and lymph node biopsy  L subclavian artery surgery 2020  Hysterectomy for fibroids 2008  Appendectomy 2014     SOCIAL HISTORY  At the time of her diagnosis, Ms. Sams lives with her partner, Judy, and 2 of her partner's cousins. Has 4 dogs. 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 40 pack-year smoking history, recently quit. No history of alcohol or illicit drug use.      FAMILY HISTORY  Mother - breast cancer b/l diagnosed at age 70s, PAD, blood clots, DM c/b ESRD  Sister - melanoma  Sister - skin cancer  Sister - DM c/b ESRD, blood clots  Brother  of lung cancer in 2023    CURRENT MEDS REVIEWED       ALLERGIES REVIEWED        SUBJECTIVE:  Spent time with her family in [place] after her brother's death to lung CA   She has a large family and it was a good time for her with many siblings (10) and nieces and nephews  She denies any neuro sx or headaches   She denies rashes   She has been taking hydrocortisone as instructed   No weight loss     A 13 point review of systems was performed, with significant findings documented above in subjective history.    OBJECTIVE:  Vitals:     01/24/24 1030   BP: 109/69   Pulse: 80   Resp: 18   Temp: 36.5 °C (97.7 °F)   SpO2: 95%      Body surface area is 1.95 meters squared.     Wt Readings from Last 5 Encounters:   01/24/24 81.3 kg (179 lb 3.7 oz)   10/30/23 81.6 kg (180 lb)   07/18/23 80.3 kg (177 lb)   06/20/23 78 kg (172 lb)   05/16/23 79.4 kg (175 lb)       ECOGSCORE: 0- Fully active, able to carry on all pre-disease performance w/o restriction.    Physical Exam  Constitutional:       Appearance: Normal appearance.   HENT:      Head: Normocephalic and atraumatic.   Eyes:      General: No scleral icterus.     Extraocular Movements: Extraocular movements intact.      Conjunctiva/sclera: Conjunctivae normal.   Cardiovascular:      Rate and Rhythm: Normal rate and regular rhythm.   Pulmonary:      Effort: Pulmonary effort is normal.      Breath sounds: Normal breath sounds.   Abdominal:      Palpations: Abdomen is soft. There is no mass.      Tenderness: There is no abdominal tenderness. There is no guarding.   Musculoskeletal:      Right lower leg: No edema.      Left lower leg: No edema.   Skin:     General: Skin is warm and dry.      Coloration: Skin is not pale.      Findings: No erythema or rash.   Neurological:      General: No focal deficit present.      Mental Status: She is alert and oriented to person, place, and time. Mental status is at baseline.      Motor: No weakness.      Gait: Gait normal.   Psychiatric:         Mood and Affect: Mood normal.         Behavior: Behavior normal.         Thought Content: Thought content normal.         Judgment: Judgment normal.          Diagnostic Results   Results:  Labs:  Lab Results   Component Value Date    WBC 9.4 01/17/2024    HGB 14.6 01/17/2024    HCT 44.8 01/17/2024    MCV 93 01/17/2024     01/17/2024      Lab Results   Component Value Date    NEUTROABS 4.49 01/17/2024        Lab Results   Component Value Date    GLUCOSE 106 (H) 01/17/2024    CALCIUM 9.3 01/17/2024     01/17/2024     K 4.1 01/17/2024    CO2 27 01/17/2024     01/17/2024    BUN 15 01/17/2024    CREATININE 1.06 (H) 01/17/2024    MG 1.86 10/20/2023     Lab Results   Component Value Date    ALT 40 01/17/2024    AST 20 01/17/2024    ALKPHOS 98 01/17/2024    BILITOT 0.7 01/17/2024    BILIDIR 0.1 07/24/2023      Lab Results   Component Value Date    ACTH <1.5 (L) 01/17/2024    CORTISOL 0.9 (L) 01/17/2024    TSH 0.82 01/17/2024    FREET4 1.02 01/17/2024     STUDY:  CT ABDOMEN W IV CONTRAST; CT CHEST W IV CONTRAST;  1/19/2024 8:45 am;  1/19/2024 8:46 am      INDICATION:  Signs/Symptoms:met lung cancer restage post therapy;  Signs/Symptoms:met lung cancer restage after therapy.      COMPARISON:  10/20/2023 CT      ACCESSION NUMBER(S):  [id number]; [id number]      ORDERING CLINICIAN:  MALENA MUNIZ      TECHNIQUE:  Intravenous contrast enhanced CT images of the chest and abdomen were  obtained from the lung apices through the iliac crest using 75 mL  Omnipaque 350 intravenous contrast.      FINDINGS:  Chest:      The right lower lobe has a spiculated mass 10 x 15 mm, unchanged.      No new lung mass or mediastinal/hilar adenopathy is noted. The right  lower lobe base has a 1 cm simple cyst, unchanged.      No infection, pleural or pericardial effusion is noted.    Abdomen:  The liver has developed mild decrease in density relative to the  spleen with no focal hepatic mass noted.      No other mass or inflammatory process of the abdomen is noted.      The anterior midline abdominal wall has a fat containing hernia  superior to the umbilicus similar to the prior exam. An additional  hernia containing a gas-filled loop of small bowel lies to the left  of the umbilicus similar to the prior exam as well.      IMPRESSION:  1.  The right lower lobe mass is unchanged. No new mass of the chest  or abdomen suggestive of metastatic disease is noted.  2. The liver has developed mild fatty infiltration.  3. Midline abdominal wall hernia  similar to prior exam.    STUDY:  MR BRAIN W AND WO IV CONTRAST; ;  1/17/2024 9:32 am      INDICATION:  Signs/Symptoms: Metastatic lung cancer to the brain s/p gamma knife  SRS to a single lesion. Evaluate for progression  C34.31: Primary  malignant neoplasm of right lower lobe of lung C34.90: Malignant  neoplasm of lung metastatic to brain.      COMPARISON:  MRI brain from 08/31/2023.      ACCESSION NUMBER(S):  [id number]      ORDERING CLINICIAN:  MYNOR SHAFFER      TECHNIQUE:  MRI of the brain was performed with the acquisition of axial  diffusion-weighted, axial T1, axial FLAIR, axial T2 gradient echo,  axial T2 fat saturated, axial T1 fat saturated postcontrast sequence,  and volumetric axial T1 post contrast sequence with multiplanar  reformats.      Contrast: 15 mL of MultiHance was injected intravenously.      FINDINGS:  There is a punctate focus susceptibility artifact located within the  posterior right occipital lobe consistent with prior hemosiderin  deposition within a treated metastasis. There is new enhancement  surrounding this focus of signal abnormality measuring 5 mm (axial T1  volumetric post-contrast sequence series 8 image 93 of 176). There is  another punctate focus of enhancement located within the right  occipital lobe just anterior and lateral to this aforementioned focus  of enhancement (image 92). There is new slight FLAIR hyperintense  signal located in this region of enhancement is nonspecific. There is  no surrounding mass effect.      There is no abnormal intracranial enhancement or mass otherwise.  There is no acute intracranial hemorrhage or infarct. There is no  abnormal extra-axial fluid collection. There is stable mild downward  migration of the cerebellar tonsils consistent with cerebellar  tonsillar ectopia.      Ventricles, sulci, and basilar cisterns are unchanged in size, shape,  and configuration. Visualized paranasal sinuses are essentially clear.      There is partial  opacification of the right mastoid air cells with  nonspecific fluid.      There is a 7 mm focus of enhancement located within the right  occipital bone (sagittal postcontrast sequence series 11, image 31 of  85) which was not present on the prior MRI brain from 01/03/2023 but  was present on the subsequent MRI brain studies. This focus of  enhancement has slightly increased in size since the prior MRI from  08/31/2023 when it measured 5 mm.      IMPRESSION:  1. Interval development of small amount of enhancement located within  the right occipital lobe surrounding the treated occipital lobe  metastasis which may reflect tumor recurrence.      2. Subcentimeter focus of enhancement located within the right  occipital bone has slightly increased in size and is also suspicious  for metastasis.    Assessment/Plan   #Stage HEAVEN NSCLC with isolated brain metastasis   Doing well - CT scans from 01/19/24 with no signs of PD as above   Return for follow-up in 3 months from now with repeated imaging.     #isolated brain metastasis s/p GKRS with Dr. Peterson  Follows with radiation oncology  MRI brain as above   Reviewed plan with Aviva Cardenas, APRN-CNP - repeat MRI  with perfusion in 4-8 weeks and follow-up with Rad Onc      #Secondary ir-adrenal insufficiency  -currently on hydrocortisone replacement 20 mg AM & 10 mg 1400  -followed by endocrinology  - stable sx - well controlled      # ir-Hypothyroidism   - currently on levothyroxine 175 mcg per day alternating with 150 mch per endocrine   - Managed by endocrinology      #Low vitamin D and B12 levels   Replacement prescribed in the past and she has been compliant.  Most recent results WNL (B12 slightly higher)      # Dry skin - improved  - likely secondary to  ir-AE  - should continue to moisturize with CeraVe BID and apply sunscreen   - call with any news sx such as rash, pruritus   - Advised to use SPF 50 or SPF clothing during summer     #Depression  - multifactorial  secondary to combination of current illness, prior illness earlier in the year, health-related stressors at home and hypothyroidism  - has improved with treatment of hypothyroidism and she has been in a great mood over the last several visits  - taking Wellbutrin (mainly for smoking cessation)  - continue xanax 0.5mg as needed q12h     #h/o subclavian steal syndrome  -continuing ASA, plavix  - recent vascular study/US on 02/01/23 demonstrating the left carotid to subclavian artery bypass graft appears widely patent.    This note was created with the assistance of a speech recognition program.  Although the intention is to generate a document that actually reflects the content of the visit, it is possible that some mistakes occur and may not be corrected by the time of completion of this note.        Michelle Owen MD, MS  Thoracic Medical Oncology   [address] [place], [place] [address]  Phone: [phone]

## 2024-01-26 NOTE — TUMOR BOARD NOTE
CNS Tumor Board Recommendations    Patient was presented by Kate Jeffrey CNP at our CNS Tumor Board on 01/24/24 which included representatives from Radiation oncology, Surgical oncology, Neuro-oncology, Pathology, Radiology, Research, Neurosurgery, Social Work (Neurosurgery).     Current patient presents with history of the following treatment history: PMH adenocarcinoma [id number] stage HEAVEN of RLL s/p CT guided biopsy of lung mass 12/07/20 and LN biopsy 12/18/20. Found to have single brain metastasis. S/p GKRS on 01/20/21 to RO lesion 20Gy 01/20/2021. Enrolled in [id number] and randomized to single agent pembrolizumab 1L. Good response in treatment with decrease in RLL mass. Completed 2 years of single agent pembrolizumab 03/01/23. MRI Brain 01/17/24 with treatment related changes and nonspecific skull lesion.      The CNS Tumor Board tumor board considered available treatment options and made the following recommendations: MRI Perfusion scan in 12 weeks.     Clinical Trial Status: N/A     National site-specific guidelines were discussed with respect to the case.

## 2024-01-29 ENCOUNTER — OFFICE VISIT (OUTPATIENT)
Dept: PRIMARY CARE | Facility: CLINIC | Age: 59
End: 2024-01-29
Payer: MEDICARE

## 2024-01-29 VITALS
TEMPERATURE: 96.7 F | OXYGEN SATURATION: 95 % | DIASTOLIC BLOOD PRESSURE: 74 MMHG | HEART RATE: 84 BPM | SYSTOLIC BLOOD PRESSURE: 112 MMHG | BODY MASS INDEX: 29.38 KG/M2 | WEIGHT: 182 LBS

## 2024-01-29 DIAGNOSIS — C79.31 LUNG CANCER METASTATIC TO BRAIN (MULTI): ICD-10-CM

## 2024-01-29 DIAGNOSIS — C34.90 LUNG CANCER METASTATIC TO BRAIN (MULTI): ICD-10-CM

## 2024-01-29 DIAGNOSIS — K08.9 DENTAL DISEASE: ICD-10-CM

## 2024-01-29 DIAGNOSIS — Z12.11 ENCOUNTER FOR COLORECTAL CANCER SCREENING: ICD-10-CM

## 2024-01-29 DIAGNOSIS — E11.22 TYPE 2 DIABETES MELLITUS WITH STAGE 3 CHRONIC KIDNEY DISEASE, WITHOUT LONG-TERM CURRENT USE OF INSULIN, UNSPECIFIED WHETHER STAGE 3A OR 3B CKD (MULTI): ICD-10-CM

## 2024-01-29 DIAGNOSIS — Z12.12 ENCOUNTER FOR COLORECTAL CANCER SCREENING: ICD-10-CM

## 2024-01-29 DIAGNOSIS — C34.91: Primary | ICD-10-CM

## 2024-01-29 DIAGNOSIS — E11.65 TYPE 2 DIABETES MELLITUS WITH HYPERGLYCEMIA, WITHOUT LONG-TERM CURRENT USE OF INSULIN (MULTI): ICD-10-CM

## 2024-01-29 DIAGNOSIS — N18.31 STAGE 3A CHRONIC KIDNEY DISEASE (MULTI): ICD-10-CM

## 2024-01-29 DIAGNOSIS — G45.8 SUBCLAVIAN STEAL SYNDROME: ICD-10-CM

## 2024-01-29 DIAGNOSIS — I73.9 PAD (PERIPHERAL ARTERY DISEASE) (CMS-HCC): ICD-10-CM

## 2024-01-29 DIAGNOSIS — N18.30 TYPE 2 DIABETES MELLITUS WITH STAGE 3 CHRONIC KIDNEY DISEASE, WITHOUT LONG-TERM CURRENT USE OF INSULIN, UNSPECIFIED WHETHER STAGE 3A OR 3B CKD (MULTI): ICD-10-CM

## 2024-01-29 PROCEDURE — 3051F HG A1C>EQUAL 7.0%<8.0%: CPT | Performed by: STUDENT IN AN ORGANIZED HEALTH CARE EDUCATION/TRAINING PROGRAM

## 2024-01-29 PROCEDURE — 1036F TOBACCO NON-USER: CPT | Performed by: STUDENT IN AN ORGANIZED HEALTH CARE EDUCATION/TRAINING PROGRAM

## 2024-01-29 PROCEDURE — 3078F DIAST BP <80 MM HG: CPT | Performed by: STUDENT IN AN ORGANIZED HEALTH CARE EDUCATION/TRAINING PROGRAM

## 2024-01-29 PROCEDURE — 3074F SYST BP LT 130 MM HG: CPT | Performed by: STUDENT IN AN ORGANIZED HEALTH CARE EDUCATION/TRAINING PROGRAM

## 2024-01-29 PROCEDURE — 99214 OFFICE O/P EST MOD 30 MIN: CPT | Performed by: STUDENT IN AN ORGANIZED HEALTH CARE EDUCATION/TRAINING PROGRAM

## 2024-01-29 PROCEDURE — 3066F NEPHROPATHY DOC TX: CPT | Performed by: STUDENT IN AN ORGANIZED HEALTH CARE EDUCATION/TRAINING PROGRAM

## 2024-01-29 PROCEDURE — 3048F LDL-C <100 MG/DL: CPT | Performed by: STUDENT IN AN ORGANIZED HEALTH CARE EDUCATION/TRAINING PROGRAM

## 2024-01-29 RX ORDER — AMOXICILLIN 500 MG/1
2000 CAPSULE ORAL ONCE
Qty: 4 CAPSULE | Refills: 0 | Status: SHIPPED | OUTPATIENT
Start: 2024-01-29 | End: 2024-01-29

## 2024-01-29 ASSESSMENT — ENCOUNTER SYMPTOMS
POLYPHAGIA: 1
BLURRED VISION: 0
SEIZURES: 0
WEIGHT LOSS: 0
SPEECH DIFFICULTY: 0
SWEATS: 0
TREMORS: 0
DIZZINESS: 0
HUNGER: 1
CONFUSION: 0
NERVOUS/ANXIOUS: 0
WEAKNESS: 0
BLACKOUTS: 0
HEADACHES: 0
POLYDIPSIA: 1
FATIGUE: 1
VISUAL CHANGE: 0

## 2024-01-29 NOTE — ASSESSMENT & PLAN NOTE
Following with hematology and oncology with Dr. Michelle Owen with . Recent growth in area of the brain. Planned additional MRI scheduled to be done in March 2024. Has follow up scheduled also.

## 2024-01-29 NOTE — ASSESSMENT & PLAN NOTE
Follows with vascular surgery Dr. Rodriguez. Chronic and stable at this time. Continue current rx med.

## 2024-01-29 NOTE — PATIENT INSTRUCTIONS
Thank you for coming in for your follow-up appointment.    I went and reordered a referral to gastroenterology for colonoscopy.  This will be need to send over to the  over in [place] so that they can perform this.    Please be sure to follow-up with your new endocrinologist with your appointment tomorrow.  Please be sure to also follow-up with vascular surgery in the next week for your regular follow-up.    Please continue to follow-up with hematology/oncology.  It looks like they have plan for another MRI to be done in March as further follow-up for stability on the in the back of the brain.  Please call us if anything else comes up or if there is anything else that you need.    I did go ahead and send in amoxicillin you can take as directed prior to any dental work.    We will plan on a pain Medicare wellness examination in May.  This can get scheduled before you leave today.    Thank you

## 2024-01-29 NOTE — PROGRESS NOTES
Subjective   Patient ID: Betsy Sams is a 58 y.o. female who presents for Follow-up (Dog bite /DM/Colonoscopy /).    Diabetes  She has type 2 diabetes mellitus. No MedicAlert identification noted. The initial diagnosis of diabetes was made 2 years ago. Hypoglycemia symptoms include hunger, mood changes and sleepiness. Pertinent negatives for hypoglycemia include no confusion, dizziness, headaches, nervousness/anxiousness, pallor, seizures, speech difficulty, sweats or tremors. Associated symptoms include fatigue, foot paresthesias, polydipsia, polyphagia and polyuria. Pertinent negatives for diabetes include no blurred vision, no chest pain, no foot ulcerations, no visual change, no weakness and no weight loss. Pertinent negatives for hypoglycemia complications include no blackouts, no hospitalization, no nocturnal hypoglycemia, no required assistance and no required glucagon injection. Symptoms are stable. Diabetic complications include nephropathy, peripheral neuropathy and PVD. Pertinent negatives for diabetic complications include no CVA, heart disease, impotence or retinopathy. There are no known risk factors, dyslipidemia and family history for coronary artery disease. Risk factors for coronary artery disease include no known risk factors, dyslipidemia and family history. Current diabetic treatment includes oral agent (monotherapy). She is compliant with treatment most of the time. Her weight is stable. She is following a generally unhealthy diet. Meal planning includes avoidance of concentrated sweets. She has not had a previous visit with a dietitian. She participates in exercise intermittently. She monitors blood glucose at home 1-2 x per day. Blood glucose monitoring compliance is adequate. Her breakfast blood glucose is taken between 7-8 am. Her breakfast blood glucose range is generally  mg/dl. She does not see a podiatrist.Eye exam is current.        She is following with a new  endocrinologist. She has an appointment tomorrow up at  in [place].  So far she thinks that they are planning on continuing her current dosing of medications.    She did have a repeat CT scan and MRI. They did see a slight increase in the size of the lesion in the posterior brain.  They are going to wait 2 months before repeating the MRI brain perfusion scan which is planned to be done in March.  She is continuing to follow-up with oncology on a regular basis.  Her most recent appointment with them was on 1/20/2024  She feels that she has been under more stress since finding of the increased lesion on the CT scan.  Overall she does feel like she is doing well.  She has been spending more time with family.    She has an appointment with Dr. Rodriguez with her vascular surgeon scheduled for 2/7/2024.  Overall she denies any other symptoms and feels like she is doing well.      Review of Systems   Constitutional:  Positive for fatigue. Negative for weight loss.   HENT:  Negative for congestion.    Eyes:  Negative for blurred vision.   Cardiovascular:  Negative for chest pain.   Endocrine: Positive for polydipsia, polyphagia and polyuria.   Genitourinary:  Negative for impotence.   Skin:  Negative for pallor.   Neurological:  Negative for dizziness, tremors, seizures, speech difficulty, weakness and headaches.   Psychiatric/Behavioral:  Negative for confusion. The patient is not nervous/anxious.        Objective   /74 (BP Location: Left arm)   Pulse 84   Temp 35.9 °C (96.7 °F)   Wt 82.6 kg (182 lb)   SpO2 95%   BMI 29.38 kg/m²     Physical Exam  Vitals and nursing note reviewed.   Constitutional:       General: She is not in acute distress.     Appearance: Normal appearance. She is obese. She is not ill-appearing or toxic-appearing.   HENT:      Head: Normocephalic and atraumatic.   Cardiovascular:      Rate and Rhythm: Normal rate and regular rhythm.      Heart sounds: Normal heart sounds.   Pulmonary:       Effort: Pulmonary effort is normal.      Breath sounds: Normal breath sounds.   Neurological:      Mental Status: She is alert.         Assessment/Plan   Problem List Items Addressed This Visit             ICD-10-CM    Diabetes (CMS/HCC) E11.9     Chronic. Stable. Following with Endocrinology with          Lung cancer metastatic to brain (CMS/HCC) C34.90, C79.31     Following with hematology and oncology with Dr. Michelle Owen with . Recent growth in area of the brain. Planned additional MRI scheduled to be done in March 2024. Has follow up scheduled also.         Stage 4 lung cancer (CMS/HCC) - Primary C34.90     Following with hematology and oncology with Dr. Michelle Owen with . Recent growth in area of the brain. Planned additional MRI scheduled to be done in March 2024. Has follow up scheduled also.         PAD (peripheral artery disease) (CMS/HCC) I73.9     Follows with vascular surgery Dr. Rodriguez. Chronic and stable at this time. Continue current rx med.         Relevant Medications    amoxicillin (Amoxil) 500 mg capsule    Stage 3a chronic kidney disease (CMS/HCC) N18.31     Chronic and stable at this time.         Subclavian steal syndrome G45.8     Follows with vascular surgery Dr. Rodriguez. Chronic and stable at this time. Continue current rx med.         Relevant Medications    amoxicillin (Amoxil) 500 mg capsule    Encounter for colorectal cancer screening Z12.11, Z12.12    Relevant Orders    Referral to Gastroenterology    Type 2 diabetes mellitus with stage 3 chronic kidney disease, without long-term current use of insulin, unspecified whether stage 3a or 3b CKD (CMS/HCC) E11.22, N18.30     Chronic. Stable. Following with Endocrinology with           Other Visit Diagnoses         Codes    Dental disease     K08.9    Relevant Medications    amoxicillin (Amoxil) 500 mg capsule          History and physical examination as above.  Patient presenting for checkup.  She had imaging performed with her  oncologist that shows possible enlargement of the focus area from her tumor in the posterior occipital lobe.  She recently had appointment with her oncologist on 1/20/2024.  They do have an MRI perfusion scan planned for in March to check for further enlargement.  She has continued follow-up scheduled.  Patient does need an order for referral to GI for screening colonoscopy.  Referral order placed.  Patient has scheduled with a different endocrinologist for her diabetes.  She has an appointment on 1/30/2024.  Patient continuing to schedule with vascular surgery and has planned appointment coming up in February.  Patient does not need antibiotics for dental prophylaxis for upcoming procedures.  Will send in prescription for amoxicillin with instructions on how to take for proper use.  Plan for Medicare wellness visit in May 2024.  Patient will get scheduled before she leaves her appointment.  She will come in sooner for other acute concerns or complaints.

## 2024-01-30 ENCOUNTER — OFFICE VISIT (OUTPATIENT)
Dept: ENDOCRINOLOGY | Facility: CLINIC | Age: 59
End: 2024-01-30
Payer: MEDICARE

## 2024-01-30 VITALS
DIASTOLIC BLOOD PRESSURE: 86 MMHG | WEIGHT: 181.4 LBS | BODY MASS INDEX: 29.28 KG/M2 | TEMPERATURE: 97.7 F | HEART RATE: 73 BPM | SYSTOLIC BLOOD PRESSURE: 144 MMHG

## 2024-01-30 DIAGNOSIS — G62.9 NEUROPATHY: ICD-10-CM

## 2024-01-30 DIAGNOSIS — E27.40 ADRENAL INSUFFICIENCY (MULTI): Primary | ICD-10-CM

## 2024-01-30 DIAGNOSIS — C34.90 MALIGNANT NEOPLASM OF LUNG, UNSPECIFIED LATERALITY, UNSPECIFIED PART OF LUNG (MULTI): ICD-10-CM

## 2024-01-30 DIAGNOSIS — E78.5 HYPERLIPIDEMIA, UNSPECIFIED HYPERLIPIDEMIA TYPE: ICD-10-CM

## 2024-01-30 DIAGNOSIS — E11.65 TYPE 2 DIABETES MELLITUS WITH HYPERGLYCEMIA, WITHOUT LONG-TERM CURRENT USE OF INSULIN (MULTI): ICD-10-CM

## 2024-01-30 DIAGNOSIS — E55.9 VITAMIN D DEFICIENCY: ICD-10-CM

## 2024-01-30 DIAGNOSIS — E03.9 HYPOTHYROIDISM, UNSPECIFIED TYPE: ICD-10-CM

## 2024-01-30 PROCEDURE — 3079F DIAST BP 80-89 MM HG: CPT | Performed by: STUDENT IN AN ORGANIZED HEALTH CARE EDUCATION/TRAINING PROGRAM

## 2024-01-30 PROCEDURE — 1036F TOBACCO NON-USER: CPT | Performed by: STUDENT IN AN ORGANIZED HEALTH CARE EDUCATION/TRAINING PROGRAM

## 2024-01-30 PROCEDURE — 3048F LDL-C <100 MG/DL: CPT | Performed by: STUDENT IN AN ORGANIZED HEALTH CARE EDUCATION/TRAINING PROGRAM

## 2024-01-30 PROCEDURE — 3051F HG A1C>EQUAL 7.0%<8.0%: CPT | Performed by: STUDENT IN AN ORGANIZED HEALTH CARE EDUCATION/TRAINING PROGRAM

## 2024-01-30 PROCEDURE — 3077F SYST BP >= 140 MM HG: CPT | Performed by: STUDENT IN AN ORGANIZED HEALTH CARE EDUCATION/TRAINING PROGRAM

## 2024-01-30 PROCEDURE — 99215 OFFICE O/P EST HI 40 MIN: CPT | Performed by: STUDENT IN AN ORGANIZED HEALTH CARE EDUCATION/TRAINING PROGRAM

## 2024-01-30 PROCEDURE — 3066F NEPHROPATHY DOC TX: CPT | Performed by: STUDENT IN AN ORGANIZED HEALTH CARE EDUCATION/TRAINING PROGRAM

## 2024-01-30 RX ORDER — OMEGA-3-ACID ETHYL ESTERS 1 G/1
2 CAPSULE, LIQUID FILLED ORAL
Qty: 120 CAPSULE | Refills: 3 | Status: SHIPPED | OUTPATIENT
Start: 2024-01-30 | End: 2024-06-11 | Stop reason: ALTCHOICE

## 2024-01-30 RX ORDER — HYDROCORTISONE 10 MG/1
TABLET ORAL
Qty: 300 TABLET | Refills: 3 | Status: SHIPPED | OUTPATIENT
Start: 2024-01-30 | End: 2024-06-11 | Stop reason: SDUPTHER

## 2024-01-30 RX ORDER — LEVOTHYROXINE SODIUM 150 UG/1
150 TABLET ORAL EVERY OTHER DAY
Qty: 40 TABLET | Refills: 3 | Status: SHIPPED | OUTPATIENT
Start: 2024-01-30 | End: 2024-06-11 | Stop reason: ALTCHOICE

## 2024-01-30 NOTE — PATIENT INSTRUCTIONS
Trulicity .75mg weekly   Continue metformin daily with your biggest meal   Stop repaglinide    Continue hydrocortisone   Continue alternating your levothyroxine    Labs end of May     Follow up in 3-4 months     Libra Gillespie MD  Divison of Endocrinology   [facility]   Phone: [phone]    option 4, then option 1  Fax: [phone]

## 2024-01-30 NOTE — PROGRESS NOTES
56 F PMH: NSCLC dx in 2020with mets to brain s/p gamma knife on keytudra from 2021 to 2023, PAD, subclavian steal     Following up regarding immunotherapy related AI and thyroiditis, previously seen Dr. Wayne     1) AI   Had low cortisol sometime in 2022 related to immunotherapy  And started on HC 20-10     2) hypothryoidism  Alternating 175 and 150mcg   Most recent TFT euthryoid     3) DM  COLT mildly positive 0.18  C peptide in 7/2023 still detectable     Metformin   Regpaglinide as needed for heavy carb meals     Past Medical History:   Diagnosis Date    Bitten or stung by nonvenomous insect and other nonvenomous arthropods, initial encounter 10/12/2020    Tick bite    Encounter for general adult medical examination without abnormal findings 02/08/2022    Annual physical exam    Encounter for immunization 02/25/2021    Encounter for immunization    Encounter for screening for malignant neoplasm of colon 05/09/2022    Screening for colon cancer    Personal history of nicotine dependence 10/12/2022    History of tobacco abuse    Personal history of other (healed) physical injury and trauma 06/24/2022    History of strain    Personal history of other diseases of the digestive system     History of gastroesophageal reflux (GERD)    Personal history of other medical treatment 02/08/2022    History of screening mammography    Personal history of other specified conditions 02/10/2022    History of nausea and vomiting    Personal history of other specified conditions 12/14/2017    History of chest pain    Personal history of other specified conditions 12/14/2017    History of dysphagia    Personal history of other specified conditions 02/19/2020    History of dizziness    Tobacco abuse counseling 06/21/2018    Encounter for smoking cessation counseling    Upper abdominal pain, unspecified 02/08/2022    Upper abdominal pain of unknown etiology     Family History   Problem Relation Name Age of Onset    Other (Cardiac  Disorder) Mother      Diabetes Mother      Kidney disease Mother      Breast cancer Mother      Other (Peripheral Arterial Disease) Mother      Other (Peripheral Arterial Disease) Sister      Hyperlipidemia Other Sibling      Social History     Socioeconomic History    Marital status: Single     Spouse name: Not on file    Number of children: Not on file    Years of education: Not on file    Highest education level: Not on file   Occupational History    Not on file   Tobacco Use    Smoking status: Former     Types: Cigarettes    Smokeless tobacco: Never   Vaping Use    Vaping Use: Never used   Substance and Sexual Activity    Alcohol use: Never    Drug use: Never    Sexual activity: Not on file   Other Topics Concern    Not on file   Social History Narrative    Not on file     Social Determinants of Health     Financial Resource Strain: Not on file   Food Insecurity: Not on file   Transportation Needs: Not on file   Physical Activity: Not on file   Stress: Not on file   Social Connections: Not on file   Intimate Partner Violence: Not on file   Housing Stability: Not on file     ROS reviewed and is negative except for pertinent findings noted on HPI    Physical Exam  Constitutional:       Appearance: Normal appearance.   Cardiovascular:      Pulses:           Dorsalis pedis pulses are 2+ on the right side and 2+ on the left side.        Posterior tibial pulses are 2+ on the right side and 2+ on the left side.   Abdominal:      Comments: Abdominal obesity   Musculoskeletal:      Cervical back: Normal range of motion.   Feet:      Right foot:      Protective Sensation: 6 sites tested.  6 sites sensed.      Left foot:      Protective Sensation: 6 sites tested.  6 sites sensed.      Comments: Redness on the soles   Skin:     Comments: Dry skin   Neurological:      Mental Status: She is alert.         labs and imaging reviewed, pertinent findings listed on HPI and Impression    Problem List Items Addressed This Visit        Adrenal insufficiency (CMS/HCC) - Primary    Relevant Medications    hydrocortisone (Cortef) 10 mg tablet    Diabetes (CMS/HCC)    Relevant Medications    dulaglutide (Trulicity) 0.75 mg/0.5 mL pen injector    Other Relevant Orders    Albumin , Urine Random    Hemoglobin A1C    Renal Function Panel    Lipid Panel    Vitamin B12    C-Peptide    Vitamin D deficiency    Relevant Orders    Vitamin D 25-Hydroxy,Total (for eval of Vitamin D levels)     Other Visit Diagnoses       Hypothyroidism, unspecified type        Relevant Medications    levothyroxine (Synthroid, Levoxyl) 150 mcg tablet    Other Relevant Orders    Thyroid Stimulating Hormone    Thyroxine, Free    Neuropathy        Relevant Medications    capsaicin 0.033 % cream    Hyperlipidemia, unspecified hyperlipidemia type        Relevant Medications    omega-3 acid ethyl esters (Lovaza) 1 gram capsule            1) continue HC 20-10  2) LT4 175-150  3) DM2, immunotherapy related? Diabetes also diagnosed after immunotherapy was started but typically presents as type 1 diabetes      Very mildly positive COLT but does have symptoms of insulin resistance, and she notes weight gain over the last couple of months         Continue metformin      Start trulicity 0.75mg weekly, discussed side effects and precautions      Stop repaglinide  Foot exam done today     Repeat labs in May will also recheck C peptide     Follow up in 3-4 months

## 2024-02-05 DIAGNOSIS — E11.65 TYPE 2 DIABETES MELLITUS WITH HYPERGLYCEMIA, WITHOUT LONG-TERM CURRENT USE OF INSULIN (MULTI): ICD-10-CM

## 2024-02-06 RX ORDER — METFORMIN HYDROCHLORIDE 500 MG/1
500 TABLET ORAL DAILY
Qty: 90 TABLET | Refills: 1 | Status: SHIPPED | OUTPATIENT
Start: 2024-02-06 | End: 2024-06-11 | Stop reason: SDUPTHER

## 2024-02-07 ENCOUNTER — APPOINTMENT (OUTPATIENT)
Dept: VASCULAR SURGERY | Facility: CLINIC | Age: 59
End: 2024-02-07
Payer: MEDICARE

## 2024-03-01 ENCOUNTER — TELEPHONE (OUTPATIENT)
Dept: PRIMARY CARE | Facility: CLINIC | Age: 59
End: 2024-03-01
Payer: MEDICARE

## 2024-03-01 DIAGNOSIS — R11.0 NAUSEA: ICD-10-CM

## 2024-03-01 DIAGNOSIS — K21.9 GASTROESOPHAGEAL REFLUX DISEASE, UNSPECIFIED WHETHER ESOPHAGITIS PRESENT: ICD-10-CM

## 2024-03-01 DIAGNOSIS — E55.9 VITAMIN D DEFICIENCY: Primary | ICD-10-CM

## 2024-03-01 DIAGNOSIS — G45.8 SUBCLAVIAN STEAL SYNDROME: ICD-10-CM

## 2024-03-01 DIAGNOSIS — E78.1 HYPERTRIGLYCERIDEMIA: ICD-10-CM

## 2024-03-01 DIAGNOSIS — E53.8 VITAMIN B12 DEFICIENCY: ICD-10-CM

## 2024-03-01 NOTE — TELEPHONE ENCOUNTER
Pt called in requesting refill   ergocalciferol (Vitamin D-2) 1.25 MG (51993 UT) 12 capsule     ondansetron ODT (Zofran-ODT) 4 mg disintegrating tablet 30 day supply     atorvastatin (Lipitor) 20 mg tablet  90 day supply     clopidogrel (Plavix) 75 mg tablet 90 day supply   pantoprazole (ProtoNix) 40 mg EC tablet 90 day supply   cyanocobalamin (Vitamin B-12) 1,000 mcg tablet  90 day supply     [id number] [facility]   Phone: [phone]

## 2024-03-05 RX ORDER — PANTOPRAZOLE SODIUM 40 MG/1
40 TABLET, DELAYED RELEASE ORAL
Qty: 90 TABLET | Refills: 1 | Status: SHIPPED | OUTPATIENT
Start: 2024-03-05 | End: 2024-09-01

## 2024-03-05 RX ORDER — LANOLIN ALCOHOL/MO/W.PET/CERES
1000 CREAM (GRAM) TOPICAL DAILY
Qty: 90 TABLET | Refills: 1 | Status: SHIPPED | OUTPATIENT
Start: 2024-03-05 | End: 2024-09-01

## 2024-03-05 RX ORDER — ERGOCALCIFEROL 1.25 MG/1
50000 CAPSULE ORAL
Qty: 12 CAPSULE | Refills: 0 | Status: SHIPPED | OUTPATIENT
Start: 2024-03-05 | End: 2024-05-22 | Stop reason: SDUPTHER

## 2024-03-05 RX ORDER — ONDANSETRON 4 MG/1
4 TABLET, ORALLY DISINTEGRATING ORAL EVERY 8 HOURS PRN
Qty: 30 TABLET | Refills: 0 | Status: SHIPPED | OUTPATIENT
Start: 2024-03-05

## 2024-03-05 RX ORDER — CLOPIDOGREL BISULFATE 75 MG/1
75 TABLET ORAL DAILY
Qty: 90 TABLET | Refills: 1 | Status: SHIPPED | OUTPATIENT
Start: 2024-03-05

## 2024-03-05 RX ORDER — ATORVASTATIN CALCIUM 20 MG/1
20 TABLET, FILM COATED ORAL DAILY
Qty: 90 TABLET | Refills: 1 | Status: SHIPPED | OUTPATIENT
Start: 2024-03-05 | End: 2024-09-01

## 2024-03-07 ENCOUNTER — APPOINTMENT (OUTPATIENT)
Dept: GASTROENTEROLOGY | Facility: CLINIC | Age: 59
End: 2024-03-07
Payer: MEDICARE

## 2024-03-21 ENCOUNTER — TELEPHONE (OUTPATIENT)
Dept: RADIATION ONCOLOGY | Facility: HOSPITAL | Age: 59
End: 2024-03-21
Payer: MEDICARE

## 2024-03-21 NOTE — TELEPHONE ENCOUNTER
Called pt to remind of appointment on 3/26/24 at 1:00 Pt confirmed appointment.     Jourdan Grant MA

## 2024-03-25 DIAGNOSIS — E11.65 TYPE 2 DIABETES MELLITUS WITH HYPERGLYCEMIA, WITHOUT LONG-TERM CURRENT USE OF INSULIN (MULTI): ICD-10-CM

## 2024-03-26 ENCOUNTER — HOSPITAL ENCOUNTER (OUTPATIENT)
Dept: RADIATION ONCOLOGY | Facility: HOSPITAL | Age: 59
Setting detail: RADIATION/ONCOLOGY SERIES
Discharge: HOME | End: 2024-03-26
Payer: COMMERCIAL

## 2024-03-26 ENCOUNTER — APPOINTMENT (OUTPATIENT)
Dept: RADIATION ONCOLOGY | Facility: HOSPITAL | Age: 59
End: 2024-03-26
Payer: COMMERCIAL

## 2024-03-26 ENCOUNTER — APPOINTMENT (OUTPATIENT)
Dept: RADIOLOGY | Facility: HOSPITAL | Age: 59
End: 2024-03-26
Payer: MEDICARE

## 2024-03-26 ENCOUNTER — PHARMACY VISIT (OUTPATIENT)
Dept: PHARMACY | Facility: CLINIC | Age: 59
End: 2024-03-26
Payer: COMMERCIAL

## 2024-03-26 ENCOUNTER — HOSPITAL ENCOUNTER (OUTPATIENT)
Dept: RADIOLOGY | Facility: HOSPITAL | Age: 59
Discharge: HOME | End: 2024-03-26
Payer: COMMERCIAL

## 2024-03-26 VITALS
BODY MASS INDEX: 29.25 KG/M2 | RESPIRATION RATE: 18 BRPM | HEART RATE: 81 BPM | DIASTOLIC BLOOD PRESSURE: 75 MMHG | WEIGHT: 181.22 LBS | TEMPERATURE: 98.4 F | OXYGEN SATURATION: 97 % | SYSTOLIC BLOOD PRESSURE: 117 MMHG

## 2024-03-26 DIAGNOSIS — C79.31 LUNG CANCER METASTATIC TO BRAIN (MULTI): Primary | ICD-10-CM

## 2024-03-26 DIAGNOSIS — C34.90 LUNG CANCER METASTATIC TO BRAIN (MULTI): Primary | ICD-10-CM

## 2024-03-26 DIAGNOSIS — C79.31 LUNG CANCER METASTATIC TO BRAIN (MULTI): ICD-10-CM

## 2024-03-26 DIAGNOSIS — C34.90 LUNG CANCER METASTATIC TO BRAIN (MULTI): ICD-10-CM

## 2024-03-26 DIAGNOSIS — C34.90 ADENOCARCINOMA OF LUNG, UNSPECIFIED LATERALITY (MULTI): ICD-10-CM

## 2024-03-26 PROCEDURE — RXMED WILLOW AMBULATORY MEDICATION CHARGE

## 2024-03-26 PROCEDURE — 99214 OFFICE O/P EST MOD 30 MIN: CPT | Performed by: NURSE PRACTITIONER

## 2024-03-26 PROCEDURE — 70553 MRI BRAIN STEM W/O & W/DYE: CPT | Performed by: RADIOLOGY

## 2024-03-26 PROCEDURE — 70553 MRI BRAIN STEM W/O & W/DYE: CPT

## 2024-03-26 PROCEDURE — 2550000001 HC RX 255 CONTRASTS: Performed by: NURSE PRACTITIONER

## 2024-03-26 PROCEDURE — A9575 INJ GADOTERATE MEGLUMI 0.1ML: HCPCS | Performed by: NURSE PRACTITIONER

## 2024-03-26 RX ORDER — GADOTERATE MEGLUMINE 376.9 MG/ML
30 INJECTION INTRAVENOUS
Status: COMPLETED | OUTPATIENT
Start: 2024-03-26 | End: 2024-03-26

## 2024-03-26 RX ADMIN — GADOTERATE MEGLUMINE 24 ML: 376.9 INJECTION INTRAVENOUS at 12:49

## 2024-03-26 ASSESSMENT — COLUMBIA-SUICIDE SEVERITY RATING SCALE - C-SSRS
6. HAVE YOU EVER DONE ANYTHING, STARTED TO DO ANYTHING, OR PREPARED TO DO ANYTHING TO END YOUR LIFE?: NO
1. IN THE PAST MONTH, HAVE YOU WISHED YOU WERE DEAD OR WISHED YOU COULD GO TO SLEEP AND NOT WAKE UP?: NO
2. HAVE YOU ACTUALLY HAD ANY THOUGHTS OF KILLING YOURSELF?: NO

## 2024-03-26 ASSESSMENT — ENCOUNTER SYMPTOMS
OCCASIONAL FEELINGS OF UNSTEADINESS: 0
LOSS OF SENSATION IN FEET: 0
DEPRESSION: 0

## 2024-03-26 ASSESSMENT — PAIN SCALES - GENERAL: PAINLEVEL: 0-NO PAIN

## 2024-03-26 ASSESSMENT — PATIENT HEALTH QUESTIONNAIRE - PHQ9
SUM OF ALL RESPONSES TO PHQ9 QUESTIONS 1 AND 2: 0
2. FEELING DOWN, DEPRESSED OR HOPELESS: NOT AT ALL
1. LITTLE INTEREST OR PLEASURE IN DOING THINGS: NOT AT ALL

## 2024-03-26 NOTE — PROGRESS NOTES
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 She was treated with GK SRS on 2021 to a right occipital lesion consisting of a dose of 20 Gy.     Single agent pembrolizumab under EA 5163 initiated 03/10/21.    SUBJECTIVE  History of Present Illness:   Betsy Sams is  She says she is doing well and completed 2 yrs of  immunotherapy in 2023 and continues responding to treatment. Continues surveillance imaging and FU with Dr. Owen .She says she feels well and managing all of her usual ADLs. She denies headaches, seizures or any recent falls.  No cough, SOB, chest pain, GI complaints or bony pain. Depression seems to be managed well with current medications.     Review of Systems:    Review of Systems   All other systems reviewed and are negative.      Performance Status:   The Karnofsky performance scale today is 100, Fully active, able to carry on all pre-disease performed without restriction (ECOG equivalent 0).        OBJECTIVE  Vital Signs:  /75   Pulse 81   Temp 36.9 °C (98.4 °F) (Temporal)   Resp 18   Wt 82.2 kg (181 lb 3.5 oz)   SpO2 97%   BMI 29.25 kg/m²      Current Outpatient Medications:     albuterol (Ventolin HFA) 90 mcg/actuation inhaler, Inhale 2 puffs every 4 hours if needed for wheezing or shortness of breath., Disp: 18 g, Rfl: 1    alcohol swabs pads, medicated, use as directed  "once daily, Disp: , Rfl:     aspirin 81 mg chewable tablet, Chew 1 tablet (81 mg) once daily., Disp: , Rfl:     atorvastatin (Lipitor) 20 mg tablet, Take 1 tablet (20 mg) by mouth once daily., Disp: 90 tablet, Rfl: 1    BD Luer-Haily Syringe 3 mL 25 gauge x 1\" syringe, use as directed, Disp: , Rfl:     capsaicin 0.033 % cream, Use with both soles at bedtime, Disp: 56.6 g, Rfl: 3    clopidogrel (Plavix) 75 mg tablet, Take 1 tablet (75 mg) by mouth once daily., Disp: 90 tablet, Rfl: 1    cyanocobalamin (Vitamin B-12) 1,000 mcg tablet, Take 1 tablet (1,000 mcg) by mouth once daily., Disp: 90 tablet, Rfl: 1    dexAMETHasone (Decadron) 4 mg/mL injection, INJECT 4MG IN CASE OF EMERGENCY, Disp: , Rfl:     dulaglutide (Trulicity) 0.75 mg/0.5 mL pen injector, Inject 0.75 mg under the skin 1 (one) time per week., Disp: 4 each, Rfl: 11    dyclonine (Sucrets Sore Throat) 2 mg lozenge, Place 1 lozenge into mouth between cheek and gum every 3 hours if needed (cough)., Disp: 18 lozenge, Rfl: 1    ergocalciferol (Vitamin D-2) 1.25 MG (14338 UT) capsule, Take 1 capsule (50,000 Units) by mouth 1 (one) time per week., Disp: 12 capsule, Rfl: 0    escitalopram (Lexapro) 5 mg tablet, Take 1 tablet (5 mg) by mouth once daily., Disp: 90 tablet, Rfl: 1    hydrocortisone (Cortef) 10 mg tablet, 20mg in the morning and 10mg in the afternoon, Disp: 300 tablet, Rfl: 3    hydrocortisone 2.5 % cream, Apply topically 2 times a day as needed for irritation or rash., Disp: 28 g, Rfl: 0    ipratropium (Atrovent) 21 mcg (0.03 %) nasal spray, Administer 2 sprays into each nostril. 2-3 times daily, Disp: , Rfl:     levothyroxine (Synthroid, Levoxyl) 150 mcg tablet, Take 1 tablet (150 mcg) by mouth every other day. (ALTERNATE WITH 175 MCG), Disp: 40 tablet, Rfl: 3    levothyroxine (Synthroid, Levoxyl) 175 mcg tablet, Take 1 tablet (175 mcg) by mouth every other day. (ALTERNATE WITH 150 MG), Disp: , Rfl:     metFORMIN (Glucophage) 500 mg tablet, Take 1 " tablet (500 mg) by mouth once daily. Take with food., Disp: 90 tablet, Rfl: 1    Microlet Lancet misc, use 1 LANCET to TEST BLOOD SUGAR once daily as directed, Disp: , Rfl:     omega-3 acid ethyl esters (Lovaza) 1 gram capsule, Take 2 capsules (2 g) by mouth 2 times a day with meals., Disp: 120 capsule, Rfl: 3    ondansetron ODT (Zofran-ODT) 4 mg disintegrating tablet, Take 1 tablet (4 mg) by mouth every 8 hours if needed for nausea or vomiting., Disp: 30 tablet, Rfl: 0    OneTouch Ultra Test strip, use 1 TEST STRIP to TEST BLOOD SUGAR once daily, Disp: , Rfl:     pantoprazole (ProtoNix) 40 mg EC tablet, Take 1 tablet (40 mg) by mouth once daily in the morning. Take before meals. 30 MINUTES PRIOR TO BREAKFAST, Disp: 90 tablet, Rfl: 1    famotidine (Pepcid) 20 mg tablet, Take 1 tablet (20 mg) by mouth 2 times a day., Disp: 60 tablet, Rfl: 5    traZODone (Desyrel) 50 mg tablet, Take 1 tablet (50 mg) by mouth as needed at bedtime for sleep., Disp: 90 tablet, Rfl: 1  No current facility-administered medications for this encounter.     Physical Exam  Constitutional:       Appearance: Normal appearance.   HENT:      Head: Normocephalic and atraumatic.      Nose: Nose normal.      Mouth/Throat:      Mouth: Mucous membranes are moist.      Pharynx: Oropharynx is clear.   Eyes:      Extraocular Movements: Extraocular movements intact.      Conjunctiva/sclera: Conjunctivae normal.      Pupils: Pupils are equal, round, and reactive to light.   Cardiovascular:      Rate and Rhythm: Normal rate and regular rhythm.      Heart sounds: Normal heart sounds.   Pulmonary:      Breath sounds: Normal breath sounds.   Abdominal:      Palpations: Abdomen is soft.   Musculoskeletal:         General: Normal range of motion.      Cervical back: Normal range of motion and neck supple.   Lymphadenopathy:      Cervical: No cervical adenopathy.   Skin:     General: Skin is warm and dry.   Neurological:      General: No focal deficit present.       Mental Status: She is alert and oriented to person, place, and time.      Cranial Nerves: No cranial nerve deficit.      Sensory: No sensory deficit.      Motor: No weakness.      Coordination: Coordination normal.      Gait: Gait normal.   Psychiatric:         Mood and Affect: Mood normal.         Behavior: Behavior normal.         RESULTS:  MR brain tumor perfusion protocol w and wo IV contrast    Result Date: 3/26/2024  Interpreted By:  Bahman Nicole, STUDY: MR BRAIN TUMOR PERFUSION PROTOCOL W AND WO IV CONTRAST;  3/26/2024 1:00 pm   INDICATION: Signs/Symptoms:adenocarcinoma of the right lower lung with mediastinal lymph node metastases and a single brain metastasis s/p gamma knife. Short interval scan to assess for progression vs treatment effect..   COMPARISON:   01/17/2024   ACCESSION NUMBER(S): [id number]   ORDERING CLINICIAN: MYNOR SHAFFER   TECHNIQUE: Axial diffusion, axial T2, axial FLAIR, axial gradient echo T2, axial T1, post gadolinium volumetric T1, as well as post gadolinium axial T1 weighted MRI images of the brain were obtained. DCE permeability and DSC MRI perfusion imaging was also performed. The patient received  24 mL of  Dotarem gadolinium intravenously.   FINDINGS:   The gradient echo T2 weighted images again demonstrate a small subcentimeter focus of blooming dark signal along the posterior right occipital lobe similar in appearance when compared with the prior MRI dated 01/17/2024 suggestive of hemosiderin deposition anterior dystrophic calcification/mineralization associated with a previous treated metastasis. The current post gadolinium images again demonstrate a small amount of subcentimeter enhancement corresponding to this region measuring approximately 5 mm in greatest axial dimension as seen on axial post gadolinium volumetric T1 slice 124 of 240. The enhancement is similar when compared with the prior study study dated 01/17/2024. The small focus of enhancement remains  nonspecific and could represent post therapy related change although a small focus of enhancing neoplasm can not be excluded. The small size of the nonspecific enhancement, location along cortex, and blooming dark signal on the gradient echo T2 images renders further evaluation with advanced MRI perfusion imaging nondiagnostic. A follow-up MRI of the brain with and without gadolinium in 2-3 months would be recommended.   The diffusion-weighted images fail to demonstrate evidence of abnormal diffusion restriction to suggest acute infarction.   The ventricular system remains nondilated without evidence of hydrocephalus.   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   There is again evidence of a 5 mm focus of enhancement noted within the right occipital bone as seen on axial post gadolinium fat saturated T1 slice 21 of 38 unchanged when compared with the prior study dated 01/17/2024 but new when compared with a prior MRI dated 04/13/2023. While nonspecific, an osseous metastasis must be considered.   There is minimal mucosal thickening noted within the maxillary sinuses and scattered ethmoid air cells.   The mastoid air cells are clear.       The gradient echo T2 weighted images again demonstrate a small subcentimeter focus of blooming dark signal along the posterior right occipital lobe similar in appearance when compared with the prior MRI dated 01/17/2024 suggestive of hemosiderin deposition anterior dystrophic calcification/mineralization associated with a previous treated metastasis. The current post gadolinium images again demonstrate a small amount of subcentimeter enhancement corresponding to this region measuring approximately 5 mm in greatest axial dimension as seen on axial post gadolinium volumetric T1 slice 124 of 240. The enhancement  is similar when compared with the prior study study dated 01/17/2024. The small focus of enhancement remains nonspecific and could represent post therapy related change although a small focus of enhancing neoplasm can not be excluded. The small size of the nonspecific enhancement, location along cortex, and blooming dark signal on the gradient echo T2 images renders further evaluation with advanced MRI perfusion imaging nondiagnostic. A follow-up MRI of the brain with and without gadolinium in 2-3 months would be recommended.   There is again evidence of a 5 mm focus of enhancement noted within the right occipital bone as seen on axial post gadolinium fat saturated T1 slice 21 of 38 unchanged when compared with the prior study dated 01/17/2024 but new when compared with a prior MRI dated 04/13/2023. While nonspecific, an osseous metastasis must be considered.   MACRO: None.   Signed by: Bahman Nicole 3/26/2024 1:54 PM Dictation workstation:   [id number]     ASSESSMENT:  58 y.o. 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 We reviewed her brain MRI today which again shows treatment changes vs questionable recurrence.  Recommended MRI in 2-3 mo. FUV in rad onc after imaging.     Aviva Jeffrey CNP  [phone]

## 2024-03-27 ENCOUNTER — HOSPITAL ENCOUNTER (OUTPATIENT)
Dept: VASCULAR MEDICINE | Facility: CLINIC | Age: 59
Discharge: HOME | End: 2024-03-27
Payer: COMMERCIAL

## 2024-03-27 ENCOUNTER — OFFICE VISIT (OUTPATIENT)
Dept: VASCULAR SURGERY | Facility: CLINIC | Age: 59
End: 2024-03-27
Payer: COMMERCIAL

## 2024-03-27 VITALS
BODY MASS INDEX: 29.02 KG/M2 | WEIGHT: 179.8 LBS | DIASTOLIC BLOOD PRESSURE: 69 MMHG | SYSTOLIC BLOOD PRESSURE: 110 MMHG | HEART RATE: 73 BPM

## 2024-03-27 DIAGNOSIS — I73.9 PAD (PERIPHERAL ARTERY DISEASE) (CMS-HCC): Primary | ICD-10-CM

## 2024-03-27 DIAGNOSIS — R09.89 OTHER SPECIFIED SYMPTOMS AND SIGNS INVOLVING THE CIRCULATORY AND RESPIRATORY SYSTEMS: ICD-10-CM

## 2024-03-27 DIAGNOSIS — I73.9 PERIPHERAL VASCULAR DISEASE, UNSPECIFIED (CMS-HCC): ICD-10-CM

## 2024-03-27 PROCEDURE — 1036F TOBACCO NON-USER: CPT | Performed by: SURGERY

## 2024-03-27 PROCEDURE — 99214 OFFICE O/P EST MOD 30 MIN: CPT | Mod: 25 | Performed by: SURGERY

## 2024-03-27 PROCEDURE — 93931 UPPER EXTREMITY STUDY: CPT

## 2024-03-27 PROCEDURE — 93931 UPPER EXTREMITY STUDY: CPT | Performed by: INTERNAL MEDICINE

## 2024-03-27 PROCEDURE — 99214 OFFICE O/P EST MOD 30 MIN: CPT | Performed by: SURGERY

## 2024-03-27 PROCEDURE — 3078F DIAST BP <80 MM HG: CPT | Performed by: SURGERY

## 2024-03-27 PROCEDURE — 3048F LDL-C <100 MG/DL: CPT | Performed by: SURGERY

## 2024-03-27 PROCEDURE — 3051F HG A1C>EQUAL 7.0%<8.0%: CPT | Performed by: SURGERY

## 2024-03-27 PROCEDURE — 3074F SYST BP LT 130 MM HG: CPT | Performed by: SURGERY

## 2024-03-28 ENCOUNTER — TELEPHONE (OUTPATIENT)
Dept: GASTROENTEROLOGY | Facility: CLINIC | Age: 59
End: 2024-03-28

## 2024-03-28 ENCOUNTER — OFFICE VISIT (OUTPATIENT)
Dept: GASTROENTEROLOGY | Facility: CLINIC | Age: 59
End: 2024-03-28
Payer: COMMERCIAL

## 2024-03-28 VITALS
HEART RATE: 62 BPM | HEIGHT: 66 IN | SYSTOLIC BLOOD PRESSURE: 112 MMHG | WEIGHT: 178 LBS | OXYGEN SATURATION: 98 % | DIASTOLIC BLOOD PRESSURE: 62 MMHG | BODY MASS INDEX: 28.61 KG/M2

## 2024-03-28 DIAGNOSIS — R19.8 IRREGULAR BOWEL HABITS: Primary | ICD-10-CM

## 2024-03-28 PROCEDURE — 3074F SYST BP LT 130 MM HG: CPT | Performed by: PHYSICIAN ASSISTANT

## 2024-03-28 PROCEDURE — 3048F LDL-C <100 MG/DL: CPT | Performed by: PHYSICIAN ASSISTANT

## 2024-03-28 PROCEDURE — 3078F DIAST BP <80 MM HG: CPT | Performed by: PHYSICIAN ASSISTANT

## 2024-03-28 PROCEDURE — 99204 OFFICE O/P NEW MOD 45 MIN: CPT | Performed by: PHYSICIAN ASSISTANT

## 2024-03-28 PROCEDURE — 1036F TOBACCO NON-USER: CPT | Performed by: PHYSICIAN ASSISTANT

## 2024-03-28 PROCEDURE — 3051F HG A1C>EQUAL 7.0%<8.0%: CPT | Performed by: PHYSICIAN ASSISTANT

## 2024-03-28 RX ORDER — POLYETHYLENE GLYCOL 3350, SODIUM SULFATE ANHYDROUS, SODIUM BICARBONATE, SODIUM CHLORIDE, POTASSIUM CHLORIDE 236; 22.74; 6.74; 5.86; 2.97 G/4L; G/4L; G/4L; G/4L; G/4L
4000 POWDER, FOR SOLUTION ORAL ONCE
Qty: 4000 ML | Refills: 0 | Status: SHIPPED | OUTPATIENT
Start: 2024-03-28 | End: 2024-03-28

## 2024-03-28 NOTE — PATIENT INSTRUCTIONS
Thank you for coming in for your appointment.    -Get colonoscopy done  -Consider fiber supplement

## 2024-03-28 NOTE — ASSESSMENT & PLAN NOTE
Patient with irregular bowel habits. She states that she was having diarrhea, now more regular formed stool with Trulicity. With her age, will proceed with colonoscopy to evaluate for polyps, malignancy, inflammation. If loose stool continue, will order chronic diarrhea labs and stool studies.

## 2024-03-28 NOTE — TELEPHONE ENCOUNTER
Please call patient. I forgot to discuss 2 things at office visit:    #1: She should hold Trulicity the dose before procedure to help with gastric emptying  #2: She should call her endocrinologist and see if any steroid dosing needs to be changed for procedure given her Adrenal insufficiency.

## 2024-03-28 NOTE — PROGRESS NOTES
Subjective   Patient ID: Betsy Sams is a 58 y.o. female who was referred by her PCP for New Patient Visit and Diarrhea.    Patient's PCP is Dr. Montiel    PMH: Hypertriglyceridemia, PAD, subclavian steal syndrome on Plavix, adrenal insufficiency, DM2, CKD3, lung cancer with metastatic disease to brain    HPI  Patient states that she is here for digestive issues. She states that her normal bowel frequency was diarrhea, but this has actually improved since starting trulicity for DM2. She is currently having more regular bowel movements. She states that sometimes she will get diffuse abdominal pain before a BM that improves after a BM. She states that when she has the pain, she does get some nausea as well. She denies unexplained weight loss, dysphagia, vomiting, melena, hematochezia. She states that her last colonoscopy was at age 44 and she did have colon polyps removed. She states that her brother may have IBD.     Patient states that she has been on Plavix since subclavian steal syndrome 4 years ago. She has held it 5 days in the past with no issues.     Prior GI evaluation:  Reports 1 polyp on a colonoscopy at age 44.    Review of Systems:  Constitutional: No reported fever, chills, or weight loss.  Skin: No reported icterus, lesions, or rash.  Eye: No reported itching, pain, vision changes.  Ear: No reported discharge, hearing loss, or pain.  Nose: No reported congestion, discharge, or epistaxis.  Mouth/throat: No reported dysphagia, hoarseness, or throat pain.  Resp: No reported cough, dyspnea, or wheezing.  Cardiovascular: No reported chest pain, lower extremity edema, or palpitations.   GI: +irregular bowel movements  : No reported dysuria, hematuria, or frequency.  Neuro: No reported confusion, memory loss, headaches, or dizziness.  Psych: No reported anxiety, depression, or insomnia.  Musculoskeletal: No reported arthralgia, joint swelling, or myalgias.  Heme/lymph: No reported easy bleeding or  "bruising, or swollen lymph nodes.  Endocrine: No reported cold/heat intolerance, polydipsia, or polyuria.     Medications:  Prior to Admission medications    Medication Sig Start Date End Date Taking? Authorizing Provider   albuterol (Ventolin HFA) 90 mcg/actuation inhaler Inhale 2 puffs every 4 hours if needed for wheezing or shortness of breath. 8/11/23 8/10/24 Yes Bahman Montiel DO   alcohol swabs pads, medicated use as directed once daily 6/20/22  Yes Historical Provider, MD   aspirin 81 mg chewable tablet Chew 1 tablet (81 mg) once daily. 2/19/20  Yes Historical Provider, MD   atorvastatin (Lipitor) 20 mg tablet Take 1 tablet (20 mg) by mouth once daily. 3/5/24 9/1/24 Yes Bahman Montiel DO   BD Luer-Haily Syringe 3 mL 25 gauge x 1\" syringe use as directed 6/22/22  Yes Historical Provider, MD   capsaicin 0.033 % cream Use with both soles at bedtime 1/30/24  Yes Libra Gillespie MD   clopidogrel (Plavix) 75 mg tablet Take 1 tablet (75 mg) by mouth once daily. 3/5/24  Yes Bahman Montiel DO   cyanocobalamin (Vitamin B-12) 1,000 mcg tablet Take 1 tablet (1,000 mcg) by mouth once daily. 3/5/24 9/1/24 Yes Bahman Montiel DO   dexAMETHasone (Decadron) 4 mg/mL injection INJECT 4MG IN CASE OF EMERGENCY 3/20/22  Yes Historical Provider, MD   dulaglutide (Trulicity) 0.75 mg/0.5 mL pen injector Inject 0.75 mg under the skin 1 (one) time per week. 3/25/24 3/25/25 Yes Libra Gillespie MD   dyclonine (Sucrets Sore Throat) 2 mg lozenge Place 1 lozenge into mouth between cheek and gum every 3 hours if needed (cough). 8/11/23  Yes Bahman Montiel DO   ergocalciferol (Vitamin D-2) 1.25 MG (32156 UT) capsule Take 1 capsule (50,000 Units) by mouth 1 (one) time per week. 3/5/24 6/3/24 Yes Bahman Montiel DO   escitalopram (Lexapro) 5 mg tablet Take 1 tablet (5 mg) by mouth once daily. 10/30/23 4/27/24 Yes Bahman Montiel, DO   hydrocortisone (Cortef) 10 mg tablet 20mg in the morning and 10mg in the afternoon 1/30/24  Yes " Libra Gillespie MD   hydrocortisone 2.5 % cream Apply topically 2 times a day as needed for irritation or rash. 6/20/23 6/19/24 Yes Bahman Montiel DO   ipratropium (Atrovent) 21 mcg (0.03 %) nasal spray Administer 2 sprays into each nostril. 2-3 times daily 9/8/22  Yes Historical Provider, MD   levothyroxine (Synthroid, Levoxyl) 150 mcg tablet Take 1 tablet (150 mcg) by mouth every other day. (ALTERNATE WITH 175 MCG) 1/30/24  Yes Libra Gillespie MD   levothyroxine (Synthroid, Levoxyl) 175 mcg tablet Take 1 tablet (175 mcg) by mouth every other day. (ALTERNATE WITH 150 MG)   Yes Historical Provider, MD   metFORMIN (Glucophage) 500 mg tablet Take 1 tablet (500 mg) by mouth once daily. Take with food. 2/6/24 8/4/24 Yes Bahman Montiel DO   Microlet Lancet misc use 1 LANCET to TEST BLOOD SUGAR once daily as directed 12/22/22  Yes Historical Provider, MD   omega-3 acid ethyl esters (Lovaza) 1 gram capsule Take 2 capsules (2 g) by mouth 2 times a day with meals. 1/30/24 4/29/24 Yes Libra Gillespie MD   ondansetron ODT (Zofran-ODT) 4 mg disintegrating tablet Take 1 tablet (4 mg) by mouth every 8 hours if needed for nausea or vomiting. 3/5/24  Yes Bahman Montiel DO   OneTouch Ultra Test strip use 1 TEST STRIP to TEST BLOOD SUGAR once daily   Yes Historical Provider, MD   pantoprazole (ProtoNix) 40 mg EC tablet Take 1 tablet (40 mg) by mouth once daily in the morning. Take before meals. 30 MINUTES PRIOR TO BREAKFAST 3/5/24 9/1/24 Yes Bahman Montiel DO   famotidine (Pepcid) 20 mg tablet Take 1 tablet (20 mg) by mouth 2 times a day. 7/18/23 1/14/24  Patti Knowles MD   traZODone (Desyrel) 50 mg tablet Take 1 tablet (50 mg) by mouth as needed at bedtime for sleep. 9/13/23 3/11/24  Bartolome Timar, DO   dulaglutide (Trulicity) 0.75 mg/0.5 mL pen injector Inject 0.75 mg under the skin 1 (one) time per week. 1/30/24 3/25/24  Libra Gillespie MD       Allergies:  Naproxen    Past Medical History:  She has a  past medical history of Anxiety (12/2022), Bitten or stung by nonvenomous insect and other nonvenomous arthropods, initial encounter (10/12/2020), Encounter for general adult medical examination without abnormal findings (02/08/2022), Encounter for immunization (02/25/2021), Encounter for screening for malignant neoplasm of colon (05/09/2022), GERD (gastroesophageal reflux disease) (4/2020), Personal history of nicotine dependence (10/12/2022), Personal history of other (healed) physical injury and trauma (06/24/2022), Personal history of other diseases of the digestive system, Personal history of other medical treatment (02/08/2022), Personal history of other specified conditions (02/10/2022), Personal history of other specified conditions (12/14/2017), Personal history of other specified conditions (12/14/2017), Personal history of other specified conditions (02/19/2020), Tobacco abuse counseling (06/21/2018), and Upper abdominal pain, unspecified (02/08/2022).    Past Surgical History:  She has a past surgical history that includes Hysterectomy (12/14/2017); Tonsillectomy (12/14/2017); Appendectomy (12/14/2017); Other surgical history (02/19/2020); CT angio neck (1/6/2020); and CT guided percutaneous biopsy lung (12/7/2020).    Social History:  She reports that she has quit smoking. Her smoking use included cigarettes. She has a 40.00 pack-year smoking history. She has never used smokeless tobacco. She reports that she does not currently use alcohol. She reports that she does not use drugs.    Objective   Physical exam:  Constitutional: Well developed, well nourished 58 y.o. year old in no acute distress.   Skin: Warm and dry. Normal turgor. No rash, ulcer, trauma, jaundice.   Eyes: Pupils symmetric and reactive to light.  Respiratory: Clear to auscultation bilaterally. No wheezes, rhonchi, or rales heard.  Cardiovascular: Regular rate and rhythm. S1 and S2 appreciated and normal. No murmur, rub, or gallop heard.  "  Edema: No edema noted.  GI: +diastasis rectii. Normal bowel sounds. Soft, non-distended, TTP in LLQ. No rebound or guarding present. No hepatomegaly or splenomegaly appreciated. Abdominal aorta not palpably abnormal.  Musculoskeletal: Limbs and Joints grossly normal. Full range of motion in major joint.   Neuro: Alert and oriented x 3. Cranial nerves 2-12 grossly intact.   Psych: Normal mood and affect.        Relevant Results Recent labs reviewed in the EMR.  Lab Results   Component Value Date    HGB 14.6 01/17/2024    HGB 13.4 10/20/2023    HGB 11.4 (L) 08/10/2023    HGB 13.2 08/09/2023    HGB 11.5 (L) 08/08/2023    MCV 93 01/17/2024    MCV 94 10/20/2023    MCV 90 08/10/2023    MCV 92 08/09/2023    MCV 93 08/08/2023     01/17/2024     10/20/2023     08/10/2023     08/09/2023     08/08/2023       Lab Results   Component Value Date    FERRITIN 85 08/21/2018    IRON 119 08/21/2018       Lab Results   Component Value Date     01/17/2024    K 4.1 01/17/2024     01/17/2024    BUN 15 01/17/2024    CREATININE 1.06 (H) 01/17/2024       Lab Results   Component Value Date    BILITOT 0.7 01/17/2024     Lab Results   Component Value Date    ALT 40 01/17/2024    ALT 35 10/20/2023    ALT CANCELED 08/11/2023    ALT 24 08/10/2023    ALT 31 08/09/2023    AST 20 01/17/2024    AST 19 10/20/2023    AST CANCELED 08/11/2023    AST 13 08/10/2023    AST 21 08/09/2023    ALKPHOS 98 01/17/2024    ALKPHOS 77 10/20/2023    ALKPHOS CANCELED 08/11/2023    ALKPHOS 63 08/10/2023    ALKPHOS 78 08/09/2023       Lab Results   Component Value Date    CRP 3.93 (A) 08/10/2023    CRP 10.30 (A) 08/09/2023       No results found for: \"CALPS\"    Radiology: Reviewed imaging reviewed in the EMR.      Assessment/Plan   Problem List Items Addressed This Visit             ICD-10-CM    Irregular bowel habits - Primary R19.8     Patient with irregular bowel habits. She states that she was having diarrhea, now more " regular formed stool with Trulicity. With her age, will proceed with colonoscopy to evaluate for polyps, malignancy, inflammation. If loose stool continue, will order chronic diarrhea labs and stool studies.          Relevant Medications    Golytely [id number] -5.86 gram solution    Other Relevant Orders    Colonoscopy Diagnostic     Meds to hold: Plavix 5 days, Trulicity week before  Patient should discuss any stress steroid dosing for procedure with her endocrinologist    Keisha Chacon PA-C
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
## 2024-04-02 DIAGNOSIS — E03.2 HYPOTHYROIDISM DUE TO MEDICATION: ICD-10-CM

## 2024-04-02 DIAGNOSIS — C34.90 MALIGNANT NEOPLASM OF LUNG, UNSPECIFIED LATERALITY, UNSPECIFIED PART OF LUNG (MULTI): ICD-10-CM

## 2024-04-03 NOTE — TELEPHONE ENCOUNTER
Spoke with patient - she will check with her endocrinologist about the steroid.    Per MARILY Saleh - she advised of the trulicity.

## 2024-04-09 ENCOUNTER — LAB (OUTPATIENT)
Dept: LAB | Facility: LAB | Age: 59
End: 2024-04-09
Payer: COMMERCIAL

## 2024-04-09 DIAGNOSIS — C34.90 MALIGNANT NEOPLASM OF LUNG, UNSPECIFIED LATERALITY, UNSPECIFIED PART OF LUNG (MULTI): ICD-10-CM

## 2024-04-09 DIAGNOSIS — E03.2 HYPOTHYROIDISM DUE TO MEDICATION: ICD-10-CM

## 2024-04-09 LAB
ALBUMIN SERPL BCP-MCNC: 4 G/DL (ref 3.4–5)
ALP SERPL-CCNC: 86 U/L (ref 33–110)
ALT SERPL W P-5'-P-CCNC: 21 U/L (ref 7–45)
ANION GAP SERPL CALC-SCNC: 11 MMOL/L (ref 10–20)
AST SERPL W P-5'-P-CCNC: 16 U/L (ref 9–39)
BASOPHILS # BLD AUTO: 0.02 X10*3/UL (ref 0–0.1)
BASOPHILS NFR BLD AUTO: 0.2 %
BILIRUB SERPL-MCNC: 0.5 MG/DL (ref 0–1.2)
BUN SERPL-MCNC: 21 MG/DL (ref 6–23)
CALCIUM SERPL-MCNC: 9 MG/DL (ref 8.6–10.3)
CHLORIDE SERPL-SCNC: 106 MMOL/L (ref 98–107)
CO2 SERPL-SCNC: 24 MMOL/L (ref 21–32)
CREAT SERPL-MCNC: 1.03 MG/DL (ref 0.5–1.05)
EGFRCR SERPLBLD CKD-EPI 2021: 63 ML/MIN/1.73M*2
EOSINOPHIL # BLD AUTO: 0.07 X10*3/UL (ref 0–0.7)
EOSINOPHIL NFR BLD AUTO: 0.8 %
ERYTHROCYTE [DISTWIDTH] IN BLOOD BY AUTOMATED COUNT: 11.9 % (ref 11.5–14.5)
GLUCOSE SERPL-MCNC: 118 MG/DL (ref 74–99)
HCT VFR BLD AUTO: 40.5 % (ref 36–46)
HGB BLD-MCNC: 13.2 G/DL (ref 12–16)
IMM GRANULOCYTES # BLD AUTO: 0.01 X10*3/UL (ref 0–0.7)
IMM GRANULOCYTES NFR BLD AUTO: 0.1 % (ref 0–0.9)
LYMPHOCYTES # BLD AUTO: 2.31 X10*3/UL (ref 1.2–4.8)
LYMPHOCYTES NFR BLD AUTO: 26.7 %
MAGNESIUM SERPL-MCNC: 1.88 MG/DL (ref 1.6–2.4)
MCH RBC QN AUTO: 29.3 PG (ref 26–34)
MCHC RBC AUTO-ENTMCNC: 32.6 G/DL (ref 32–36)
MCV RBC AUTO: 90 FL (ref 80–100)
MONOCYTES # BLD AUTO: 0.46 X10*3/UL (ref 0.1–1)
MONOCYTES NFR BLD AUTO: 5.3 %
NEUTROPHILS # BLD AUTO: 5.77 X10*3/UL (ref 1.2–7.7)
NEUTROPHILS NFR BLD AUTO: 66.9 %
NRBC BLD-RTO: 0 /100 WBCS (ref 0–0)
PLATELET # BLD AUTO: 272 X10*3/UL (ref 150–450)
POTASSIUM SERPL-SCNC: 4.3 MMOL/L (ref 3.5–5.3)
PROT SERPL-MCNC: 7 G/DL (ref 6.4–8.2)
RBC # BLD AUTO: 4.5 X10*6/UL (ref 4–5.2)
SODIUM SERPL-SCNC: 137 MMOL/L (ref 136–145)
T4 FREE SERPL-MCNC: 1.23 NG/DL (ref 0.61–1.12)
TSH SERPL-ACNC: 0.07 MIU/L (ref 0.44–3.98)
WBC # BLD AUTO: 8.6 X10*3/UL (ref 4.4–11.3)

## 2024-04-09 PROCEDURE — 83735 ASSAY OF MAGNESIUM: CPT

## 2024-04-09 PROCEDURE — 82533 TOTAL CORTISOL: CPT

## 2024-04-09 PROCEDURE — 82024 ASSAY OF ACTH: CPT

## 2024-04-09 PROCEDURE — 84443 ASSAY THYROID STIM HORMONE: CPT

## 2024-04-09 PROCEDURE — 85025 COMPLETE CBC W/AUTO DIFF WBC: CPT

## 2024-04-09 PROCEDURE — 84439 ASSAY OF FREE THYROXINE: CPT

## 2024-04-09 PROCEDURE — 80053 COMPREHEN METABOLIC PANEL: CPT

## 2024-04-09 PROCEDURE — 36415 COLL VENOUS BLD VENIPUNCTURE: CPT

## 2024-04-10 LAB — CORTIS SERPL-MCNC: 28.6 UG/DL (ref 2.5–20)

## 2024-04-11 LAB — ACTH PLAS-MCNC: <1.5 PG/ML (ref 7.2–63.3)

## 2024-04-12 ENCOUNTER — HOSPITAL ENCOUNTER (OUTPATIENT)
Dept: RADIOLOGY | Facility: CLINIC | Age: 59
Discharge: HOME | End: 2024-04-12
Payer: COMMERCIAL

## 2024-04-12 DIAGNOSIS — C34.90 MALIGNANT NEOPLASM OF LUNG, UNSPECIFIED LATERALITY, UNSPECIFIED PART OF LUNG (MULTI): ICD-10-CM

## 2024-04-12 PROCEDURE — 74160 CT ABDOMEN W/CONTRAST: CPT

## 2024-04-12 PROCEDURE — 71260 CT THORAX DX C+: CPT

## 2024-04-12 PROCEDURE — 74160 CT ABDOMEN W/CONTRAST: CPT | Performed by: STUDENT IN AN ORGANIZED HEALTH CARE EDUCATION/TRAINING PROGRAM

## 2024-04-12 PROCEDURE — 71260 CT THORAX DX C+: CPT | Performed by: STUDENT IN AN ORGANIZED HEALTH CARE EDUCATION/TRAINING PROGRAM

## 2024-04-12 PROCEDURE — 2550000001 HC RX 255 CONTRASTS: Performed by: INTERNAL MEDICINE

## 2024-04-12 RX ADMIN — IOHEXOL 75 ML: 350 INJECTION, SOLUTION INTRAVENOUS at 09:11

## 2024-04-17 ENCOUNTER — OFFICE VISIT (OUTPATIENT)
Dept: HEMATOLOGY/ONCOLOGY | Facility: CLINIC | Age: 59
End: 2024-04-17
Payer: COMMERCIAL

## 2024-04-17 ENCOUNTER — EDUCATION (OUTPATIENT)
Dept: HEMATOLOGY/ONCOLOGY | Facility: HOSPITAL | Age: 59
End: 2024-04-17
Payer: MEDICARE

## 2024-04-17 VITALS
DIASTOLIC BLOOD PRESSURE: 75 MMHG | HEART RATE: 75 BPM | WEIGHT: 173.5 LBS | TEMPERATURE: 97.2 F | RESPIRATION RATE: 18 BRPM | OXYGEN SATURATION: 97 % | BODY MASS INDEX: 28 KG/M2 | SYSTOLIC BLOOD PRESSURE: 115 MMHG

## 2024-04-17 DIAGNOSIS — C34.90 LUNG CANCER METASTATIC TO BRAIN (MULTI): Primary | ICD-10-CM

## 2024-04-17 DIAGNOSIS — C79.31 LUNG CANCER METASTATIC TO BRAIN (MULTI): Primary | ICD-10-CM

## 2024-04-17 PROCEDURE — 3051F HG A1C>EQUAL 7.0%<8.0%: CPT | Performed by: CLINICAL NURSE SPECIALIST

## 2024-04-17 PROCEDURE — 99214 OFFICE O/P EST MOD 30 MIN: CPT | Performed by: CLINICAL NURSE SPECIALIST

## 2024-04-17 PROCEDURE — 3048F LDL-C <100 MG/DL: CPT | Performed by: CLINICAL NURSE SPECIALIST

## 2024-04-17 PROCEDURE — 3078F DIAST BP <80 MM HG: CPT | Performed by: CLINICAL NURSE SPECIALIST

## 2024-04-17 PROCEDURE — 3074F SYST BP LT 130 MM HG: CPT | Performed by: CLINICAL NURSE SPECIALIST

## 2024-04-17 ASSESSMENT — PATIENT HEALTH QUESTIONNAIRE - PHQ9
2. FEELING DOWN, DEPRESSED OR HOPELESS: SEVERAL DAYS
1. LITTLE INTEREST OR PLEASURE IN DOING THINGS: SEVERAL DAYS
SUM OF ALL RESPONSES TO PHQ9 QUESTIONS 1 AND 2: 2
10. IF YOU CHECKED OFF ANY PROBLEMS, HOW DIFFICULT HAVE THESE PROBLEMS MADE IT FOR YOU TO DO YOUR WORK, TAKE CARE OF THINGS AT HOME, OR GET ALONG WITH OTHER PEOPLE: SOMEWHAT DIFFICULT

## 2024-04-17 ASSESSMENT — PAIN SCALES - GENERAL: PAINLEVEL: 4

## 2024-04-17 ASSESSMENT — ENCOUNTER SYMPTOMS
DEPRESSION: 0
OCCASIONAL FEELINGS OF UNSTEADINESS: 0
LOSS OF SENSATION IN FEET: 0

## 2024-04-17 ASSESSMENT — COLUMBIA-SUICIDE SEVERITY RATING SCALE - C-SSRS
2. HAVE YOU ACTUALLY HAD ANY THOUGHTS OF KILLING YOURSELF?: NO
6. HAVE YOU EVER DONE ANYTHING, STARTED TO DO ANYTHING, OR PREPARED TO DO ANYTHING TO END YOUR LIFE?: NO
1. IN THE PAST MONTH, HAVE YOU WISHED YOU WERE DEAD OR WISHED YOU COULD GO TO SLEEP AND NOT WAKE UP?: NO

## 2024-04-17 NOTE — RESEARCH NOTES
Research Note Treatment Day    Betsy Sams is here today for follow up on [id number]. Today is month 36. Procedures completed per protocol. AE's and con-meds reviewed with patient.   Patient is in office today for review of scan results.  Patient is scheduled for PET/CT on 4/26 and a televisit on 5/1 for planning next steps.  Patient is agreeable to plan    Education Documentation  Treatment Plan and Schedule, taught by Nora Torres RN at 4/17/2024  1:35 PM.  Learner: Patient  Readiness: Acceptance  Method: Explanation  Response: Verbalizes Understanding    Education Comments  No comments found.

## 2024-04-17 NOTE — PROGRESS NOTES
Patient ID: Betsy Sams is a 58 y.o. female    Primary Care Provider: Bahman Montiel DO    DIAGNOSIS AND STAGING  Stage HEAVEN ([id number]) NSCLC (adenocarcinoma with lepidic growth) of the RLL - dx on 12/9/2020       SITES OF DISEASE  Right lower lobe  Brain (right posterior occipital metastasis)  Concern for progression in omentum, status post biopsy on 12/14/2022 with no malignancy identified, chronic inflammatory changes noted    MOLECULAR GENOMICS  MICROSATELLITE STATUS: Microsatellite Stable (PELON)     DISEASE ASSOCIATED GENOMIC FINDINGS: None     DISEASE RELEVANT ALTERATIONS NOT DETECTED:  Negative for ALK fusion.  Negative for BRAF V600E.  Negative for EGFR (sensitizing) mutation.  Negative for NTRK fusion.  Negative for ROS1 fusion.  Negative for RET fusion.  Negative for MET exon 14 skipping mutation.     PD-L1 TPS 75%     PRIOR THERAPIES  GKRS to the right posterior occipital metastasis on 01/20/21 (20 Gy)  Single agent pembrolizumab under [id number] begins 03/10/21; last dose on 03/01/2023     CURRENT THERAPY  Observation    CURRENT ONCOLOGICAL PROBLEMS  Immune related adrenal insufficiency     HISTORY OF PRESENT ILLNESS  She initially presented to her PCP with hemoptysis that began in October, after which CT chest w/o contrast was taken on 10/30/2020 and showed a RLL mass of 4.4 x 2.8 x 3.1 cm with mass effect on regional bronchi. Scan positive also for multiple enlarged  ipsilateral mediastinal and paratracheal lymph nodes, largest subcarinal measuring 1.8 cm. PET/CT additionally showing SUV max of 20.1 in RLL mass, SUV max of 11.7 in  ipsilateral paratracheal and subcarinal LNs 2R and 4R. MRI brain done on  1/5/2020 showed a 12 mm enhancing lesion in posterior right occipital lobe with surrounding vasogenic edema.     CT guided biopsy of lung mass on 12/07/2020 confirmed adenocarcinoma. LN biopsy on 12/18/2020 showed node 4R and 7 were positive with malignant cells from  adenocarcinoma. 4L was  negative.     NGS resulted on 01/08/21: no actionable mutations - microsatellite stable - PD-L1 TPS unable to be obtained due to QNS     01/20/21: GKRS to the right occipital metastasis      03/01/21: PD-L1 TPS 75% - enrolls in [id number] - randomized to single agent pembrolizumab 1L      03/08/21: Brain MRI shows a new 5 mm L frontal lobe metastasis      03/10/21: begins single agent pembrolizumab under [id number]     03/31/21: C2D1 single agent pembrolizumab      04/19/21: Brain MRI: Imaging personally reviewed by me shows an interval decrease in size of right medial occipital mass now measuring 4 mm. Previously noted enhancing focus within the left  frontal lobe is no longer appreciated, apparently resolved.     04/20/21: CT C/A/P:  imaging personally reviewed by me:  The RLL mass has decreased in size -   There are inflammatory changes noted in that lobe -   No new lesions identified -      04/28/21: C3D1 pembrolizumab      05/19/21: C4D1 pembrolizumab      05/28/21: CT C/A/P shows  3.9 cm RLL mass image 202/317, 8 mm subcarinal node image 127/317 that correlates with 30 % or more decrease in diameter  consistent with partial response.      06/09/21: C5D1 pembrolizumab       08/09/21: CT C/A/P continues to have a AR - the RLL mass has decreased further in size (3.6 cm) as well as the level 7 node (7 mm)      08/11/21: C8D1 pembrolizumab      09/01/21: C9D1 pembrolizumab      10/11/21: CT C/A/P shows the RLL mass to measure 2.9 cm (image # 200/303). Level 7node measures 1.2 cm (#123/303). Right hilar node more prominent measuring 1.2 cm (#123/303). 4R 1.0 cm (#72/303).      11/03/21: C12 pembrolizumab under EA [id number]     11/11/21: MRI brain shows JOSE -      11/22/21: C13D1 pembrolizumab     12/13/21: CT C/A/P shows stable findings - the RLL mass measures 2.7 cm - the right paratracheal prominent node is slightly decreased in size      12/15/21: C14D1 pembrolizumab under [id number]     1/5/21: Grade 1 diarrhea irAE - starting  imodium for control. Continuing with C15D1 pembrolizumab today     2/14/2022: CT Chest/Abdomen/Pelvis shows a 2.6 cm RLL mass, down from 2.9 cm in Dec, sub carinal node measures 1.2 cm, prev 1 cm. right axiliary node has become more prominent, now measuring 1.2 cm. unclear if this represents PD (of note, this was never  target lesion) the sum of target lesions has gone down by 50% from baseline     2/18/2022: pt diagnosed with secondary adrenal insufficiency- prescribed hydrocortisone 20 mg in the morning, 10 mg in the evening, referred to endocrinology     02/25/2022: brain mri/sella turcica demonstrates normal sized pituitary gland, small lesion in right posterior occipital lobe, prev treated with no changes      05/03/2022: CT Chest/Abdomen/Pelvis shows decreases in size of RLL mass now measuring 2.2 cm, no evidence of emerging metastatic disease. At this point, pt has achieved a 59% decrease in the sum of all target lesions.      08/19/22: CT C/A/P shows stable changes with no new sites of metastasis      09/01/22: Brain MRI shows no signs of PD      11/11/2022: CT chest/abdomen/pelvis demonstrates soft tissue nodule measuring 1.6 cm in the greater omentum, concerning for peritoneal metastatic disease.  Other findings remain stable.      11/18/2022: PET scan demonstrates the soft tissue nodule in the right superior aspect of the greater omentum to have an SUV of 2.1, which could be inflammatory in nature. Bx requested      12/14/2022: Biopsy of the omental nodule demonstrates fibroadipose tissue with chronic inflammation and scarring, no malignancy identified.  No evidence of confirmed PD.  Patient remains on pembrolizumab.      01/03/2023: MRI brain with and without contrast demonstrates no intracranial metastasis     02/07/2023: CT chest/abdomen/pelvis demonstrates no signs of PD.  Right lower lobe primary lung cancer remains a stable in size.  The omental nodule that was biopsied in the past is fading away,  in concordance with pathology results showing adipose tissue  with inflammation.      2023: Completes 2 years of single agent pembrolizumab under EA [id number]      2020: Brain MRI with no remarkable findings.  No evidence of intracranial metastasis.     2023: CT chest/abdomen/pelvis demonstrates no signs of PD (60% decrease in sum of target lesions c/t baselien).    24: MRI brain demonstrating nodular enhancement in right posterior occipital lobe and adjacent calvarium    24: CT C/A/P shows no signs of PD  24:  CT shows some increase in spiculated nodule and mediastinal lymph nodes suggestive of progression.        PAST MEDICAL HISTORY  GERD - taking pantoprazole 40mg daily  subclavian steal syndrome - aspirin 81mg daily, plavix 75mg daily  depression - taking buspirone 5mg once daily  COVID19 vaccination + status s/p booster    SURGICAL HISTORY  Lung and lymph node biopsy  L subclavian artery surgery 2020  Hysterectomy for fibroids 2008  Appendectomy 2014     SOCIAL HISTORY  At the time of her diagnosis, Ms. Sams lives with her partner, Judy, and 2 of her partner's cousins. Has 4 dogs. 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 40 pack-year smoking history, recently quit. No history of alcohol or illicit drug use.      FAMILY HISTORY  Mother - breast cancer b/l diagnosed at age 70s, PAD, blood clots, DM c/b ESRD  Sister - melanoma  Sister - skin cancer  Sister - DM c/b ESRD, blood clots  Brother  of lung cancer in 2023    CURRENT MEDS REVIEWED       ALLERGIES REVIEWED        SUBJECTIVE:  Today I am meeting with Betsy.  She reports she is feeling quite well overall, however she is upset about what she has read about her scan.  We discussed this in detail.  She is, apparently most worried about the potential of needing chemotherapy - she found the immune therapy easy to get despite some of the side effects she has  had.  No new fatigue, appetite change or weight loss.    A 13 point review of systems was performed, with significant findings documented above in subjective history.    OBJECTIVE:  Vitals:    04/17/24 0930   BP: 115/75   Pulse: 75   Resp: 18   Temp: 36.2 °C (97.2 °F)   SpO2: 97%      Body surface area is 1.91 meters squared.     Wt Readings from Last 5 Encounters:   04/17/24 78.7 kg (173 lb 8 oz)   03/28/24 80.7 kg (178 lb)   03/27/24 81.6 kg (179 lb 12.8 oz)   03/26/24 82.2 kg (181 lb 3.5 oz)   01/30/24 82.3 kg (181 lb 6.4 oz)       ECOGSCORE: 0- Fully active, able to carry on all pre-disease performance w/o restriction.    Physical Exam  Constitutional:       Appearance: Normal appearance.   HENT:      Head: Normocephalic and atraumatic.   Eyes:      General: No scleral icterus.     Extraocular Movements: Extraocular movements intact.      Conjunctiva/sclera: Conjunctivae normal.   Cardiovascular:      Rate and Rhythm: Normal rate and regular rhythm.   Pulmonary:      Effort: Pulmonary effort is normal.      Breath sounds: Normal breath sounds.   Abdominal:      Palpations: Abdomen is soft. There is no mass.      Tenderness: There is no abdominal tenderness. There is no guarding.   Musculoskeletal:      Right lower leg: No edema.      Left lower leg: No edema.   Skin:     General: Skin is warm and dry.      Coloration: Skin is not pale.      Findings: No erythema or rash.   Neurological:      General: No focal deficit present.      Mental Status: She is alert and oriented to person, place, and time. Mental status is at baseline.      Motor: No weakness.      Gait: Gait normal.   Psychiatric:         Mood and Affect: Mood normal.         Behavior: Behavior normal.         Thought Content: Thought content normal.         Judgment: Judgment normal.        Diagnostic Results   Results:  Labs:  Lab Results   Component Value Date    WBC 8.6 04/09/2024    HGB 13.2 04/09/2024    HCT 40.5 04/09/2024    MCV 90 04/09/2024      04/09/2024      Lab Results   Component Value Date    NEUTROABS 5.77 04/09/2024        Lab Results   Component Value Date    GLUCOSE 118 (H) 04/09/2024    CALCIUM 9.0 04/09/2024     04/09/2024    K 4.3 04/09/2024    CO2 24 04/09/2024     04/09/2024    BUN 21 04/09/2024    CREATININE 1.03 04/09/2024    MG 1.88 04/09/2024     Lab Results   Component Value Date    ALT 21 04/09/2024    AST 16 04/09/2024    ALKPHOS 86 04/09/2024    BILITOT 0.5 04/09/2024    BILIDIR 0.1 07/24/2023      Lab Results   Component Value Date    ACTH <1.5 (L) 04/09/2024    CORTISOL 28.6 (H) 04/09/2024    TSH 0.07 (L) 04/09/2024    FREET4 1.23 (H) 04/09/2024     STUDY:  CT ABDOMEN W IV CONTRAST; CT CHEST W IV CONTRAST;  1/19/2024 8:45 am;  1/19/2024 8:46 am      INDICATION:  Signs/Symptoms:met lung cancer restage post therapy;  Signs/Symptoms:met lung cancer restage after therapy.      COMPARISON:  10/20/2023 CT      ACCESSION NUMBER(S):  [id number]; [id number]  === 04/12/24 ===    CT ABDOMEN W IV CONTRAST    - Impression -  CHEST:  1.  Minimally increased right lower lobe spiculated nodule, now 16 x  10 mm, previously 15 x 10 mm, since 01/19/2024.  2. Interval increase in the size of several hilar and mediastinal  lymph nodes.    ABDOMEN:  1.  No evidence of metastatic disease in the abdomen.      Signed by: Fernandez Quintana 4/14/2024 12:41 PM  Dictation workstation:   [id number]     STUDY:  MR BRAIN W AND WO IV CONTRAST; ;  1/17/2024 9:32 am      INDICATION:  Signs/Symptoms: Metastatic lung cancer to the brain s/p gamma knife  SRS to a single lesion. Evaluate for progression  C34.31: Primary  malignant neoplasm of right lower lobe of lung C34.90: Malignant  neoplasm of lung metastatic to brain.      COMPARISON:  MRI brain from 08/31/2023.      ACCESSION NUMBER(S):  [id number]      ORDERING CLINICIAN:  MYNOR SHAFFER      TECHNIQUE:  MRI of the brain was performed with the acquisition of  axial  diffusion-weighted, axial T1, axial FLAIR, axial T2 gradient echo,  axial T2 fat saturated, axial T1 fat saturated postcontrast sequence,  and volumetric axial T1 post contrast sequence with multiplanar  reformats.      Contrast: 15 mL of MultiHance was injected intravenously.      FINDINGS:  There is a punctate focus susceptibility artifact located within the  posterior right occipital lobe consistent with prior hemosiderin  deposition within a treated metastasis. There is new enhancement  surrounding this focus of signal abnormality measuring 5 mm (axial T1  volumetric post-contrast sequence series 8 image 93 of 176). There is  another punctate focus of enhancement located within the right  occipital lobe just anterior and lateral to this aforementioned focus  of enhancement (image 92). There is new slight FLAIR hyperintense  signal located in this region of enhancement is nonspecific. There is  no surrounding mass effect.      There is no abnormal intracranial enhancement or mass otherwise.  There is no acute intracranial hemorrhage or infarct. There is no  abnormal extra-axial fluid collection. There is stable mild downward  migration of the cerebellar tonsils consistent with cerebellar  tonsillar ectopia.      Ventricles, sulci, and basilar cisterns are unchanged in size, shape,  and configuration. Visualized paranasal sinuses are essentially clear.      There is partial opacification of the right mastoid air cells with  nonspecific fluid.      There is a 7 mm focus of enhancement located within the right  occipital bone (sagittal postcontrast sequence series 11, image 31 of  85) which was not present on the prior MRI brain from 01/03/2023 but  was present on the subsequent MRI brain studies. This focus of  enhancement has slightly increased in size since the prior MRI from  08/31/2023 when it measured 5 mm.      IMPRESSION:  1. Interval development of small amount of enhancement located within  the  right occipital lobe surrounding the treated occipital lobe  metastasis which may reflect tumor recurrence.      2. Subcentimeter focus of enhancement located within the right  occipital bone has slightly increased in size and is also suspicious  for metastasis.    Assessment/Plan   #Stage HEAVEN NSCLC with isolated brain metastasis   Clinically doing well - CT scans from 01/19/24 with no signs of progression, however scan done for today's visit shows increase (mild) in spiculated lesion and some lymph node enlargement, concerning for progression.      PLAN:    We will proceed with a PET scan to better determine if this represents progression of her cancer- she has been off immune therapy for a year- recall there was also some concern for brain mets on last scan- there is a plan for short interval repeat of MRI.      After PET, she will meet with Dr. Owen to determine next steps.       #isolated brain metastasis s/p GKRS with Dr. Peterson  Follows with radiation oncology  MRI brain as above   Reviewed plan with Aviva Cardenas, SUHAS-CNP - repeat MRI  with perfusion in 4-8 weeks and follow-up with Rad Onc      #Secondary ir-adrenal insufficiency  -currently on hydrocortisone replacement 20 mg AM & 10 mg 1400  -followed by endocrinology  - stable sx - well controlled      # ir-Hypothyroidism   - currently on levothyroxine 175 mcg per day alternating with 150 mch per endocrine   - Managed by endocrinology      #Low vitamin D and B12 levels   Replacement prescribed in the past and she has been compliant.  Most recent results WNL (B12 slightly higher)      # Dry skin - improved  - likely secondary to  ir-AE  - should continue to moisturize with CeraVe BID and apply sunscreen   - call with any news sx such as rash, pruritus   - Advised to use SPF 50 or SPF clothing during summer     #Depression  - multifactorial secondary to combination of current illness, prior illness earlier in the year, health-related stressors at home and  hypothyroidism  - has improved with treatment of hypothyroidism and she has been in a great mood over the last several visits, including today.   - taking Wellbutrin (mainly for smoking cessation)  - continue xanax 0.5mg as needed q12h     #h/o subclavian steal syndrome  -continuing ASA, plavix  - recent vascular study/US on 02/01/23 demonstrating the left carotid to subclavian artery bypass graft appears widely patent.

## 2024-04-22 PROCEDURE — RXMED WILLOW AMBULATORY MEDICATION CHARGE

## 2024-04-23 ENCOUNTER — APPOINTMENT (OUTPATIENT)
Dept: GASTROENTEROLOGY | Facility: HOSPITAL | Age: 59
End: 2024-04-23
Payer: COMMERCIAL

## 2024-04-24 ENCOUNTER — PHARMACY VISIT (OUTPATIENT)
Dept: PHARMACY | Facility: CLINIC | Age: 59
End: 2024-04-24
Payer: COMMERCIAL

## 2024-04-26 ENCOUNTER — HOSPITAL ENCOUNTER (OUTPATIENT)
Dept: RADIOLOGY | Facility: HOSPITAL | Age: 59
Discharge: HOME | End: 2024-04-26
Payer: COMMERCIAL

## 2024-04-26 DIAGNOSIS — C34.90 LUNG CANCER METASTATIC TO BRAIN (MULTI): ICD-10-CM

## 2024-04-26 DIAGNOSIS — C79.31 LUNG CANCER METASTATIC TO BRAIN (MULTI): ICD-10-CM

## 2024-04-26 PROCEDURE — A9552 F18 FDG: HCPCS | Performed by: CLINICAL NURSE SPECIALIST

## 2024-04-26 PROCEDURE — 78815 PET IMAGE W/CT SKULL-THIGH: CPT | Mod: PS

## 2024-04-26 PROCEDURE — 3430000001 HC RX 343 DIAGNOSTIC RADIOPHARMACEUTICALS: Performed by: CLINICAL NURSE SPECIALIST

## 2024-04-26 PROCEDURE — 78815 PET IMAGE W/CT SKULL-THIGH: CPT | Mod: PET TUMOR SUBSQ TX STRATEGY | Performed by: NUCLEAR MEDICINE

## 2024-04-26 RX ORDER — FLUDEOXYGLUCOSE F 18 200 MCI/ML
12.4 INJECTION, SOLUTION INTRAVENOUS
Status: COMPLETED | OUTPATIENT
Start: 2024-04-26 | End: 2024-04-26

## 2024-04-26 RX ADMIN — FLUDEOXYGLUCOSE F 18 12.4 MILLICURIE: 200 INJECTION, SOLUTION INTRAVENOUS at 09:21

## 2024-05-01 ENCOUNTER — TELEMEDICINE (OUTPATIENT)
Dept: HEMATOLOGY/ONCOLOGY | Facility: CLINIC | Age: 59
End: 2024-05-01
Payer: COMMERCIAL

## 2024-05-01 DIAGNOSIS — C34.90 LUNG CANCER METASTATIC TO BRAIN (MULTI): Primary | ICD-10-CM

## 2024-05-01 DIAGNOSIS — R59.1 LYMPHADENOPATHY: ICD-10-CM

## 2024-05-01 DIAGNOSIS — C79.31 LUNG CANCER METASTATIC TO BRAIN (MULTI): Primary | ICD-10-CM

## 2024-05-01 DIAGNOSIS — Z01.818 PRE-OP TESTING: ICD-10-CM

## 2024-05-01 PROCEDURE — 99214 OFFICE O/P EST MOD 30 MIN: CPT | Performed by: INTERNAL MEDICINE

## 2024-05-01 PROCEDURE — 3048F LDL-C <100 MG/DL: CPT | Performed by: INTERNAL MEDICINE

## 2024-05-01 PROCEDURE — 3051F HG A1C>EQUAL 7.0%<8.0%: CPT | Performed by: INTERNAL MEDICINE

## 2024-05-01 NOTE — PROGRESS NOTES
Patient ID: Betsy Sams is a 58 y.o. female    Primary Care Provider: Bahman Montiel DO    DIAGNOSIS AND STAGING  Stage HEAVEN ([id number]) NSCLC (adenocarcinoma with lepidic growth) of the RLL - dx on 12/9/2020       SITES OF DISEASE  Right lower lobe  Brain (right posterior occipital metastasis)  Concern for progression in omentum, status post biopsy on 12/14/2022 with no malignancy identified, chronic inflammatory changes noted    MOLECULAR GENOMICS  MICROSATELLITE STATUS: Microsatellite Stable (PELON)     DISEASE ASSOCIATED GENOMIC FINDINGS: None     DISEASE RELEVANT ALTERATIONS NOT DETECTED:  Negative for ALK fusion.  Negative for BRAF V600E.  Negative for EGFR (sensitizing) mutation.  Negative for NTRK fusion.  Negative for ROS1 fusion.  Negative for RET fusion.  Negative for MET exon 14 skipping mutation.     PD-L1 TPS 75%     PRIOR THERAPIES  GKRS to the right posterior occipital metastasis on 01/20/21 (20 Gy)  Single agent pembrolizumab under [id number] begins 03/10/21; last dose on 03/01/2023     CURRENT THERAPY  Observation    CURRENT ONCOLOGICAL PROBLEMS  Immune related adrenal insufficiency     HISTORY OF PRESENT ILLNESS  She initially presented to her PCP with hemoptysis that began in October, after which CT chest w/o contrast was taken on 10/30/2020 and showed a RLL mass of 4.4 x 2.8 x 3.1 cm with mass effect on regional bronchi. Scan positive also for multiple enlarged  ipsilateral mediastinal and paratracheal lymph nodes, largest subcarinal measuring 1.8 cm. PET/CT additionally showing SUV max of 20.1 in RLL mass, SUV max of 11.7 in  ipsilateral paratracheal and subcarinal LNs 2R and 4R. MRI brain done on  1/5/2020 showed a 12 mm enhancing lesion in posterior right occipital lobe with surrounding vasogenic edema.     CT guided biopsy of lung mass on 12/07/2020 confirmed adenocarcinoma. LN biopsy on 12/18/2020 showed node 4R and 7 were positive with malignant cells from  adenocarcinoma. 4L was  negative.     NGS resulted on 01/08/21: no actionable mutations - microsatellite stable - PD-L1 TPS unable to be obtained due to QNS     01/20/21: GKRS to the right occipital metastasis      03/01/21: PD-L1 TPS 75% - enrolls in [id number] - randomized to single agent pembrolizumab 1L      03/08/21: Brain MRI shows a new 5 mm L frontal lobe metastasis      03/10/21: begins single agent pembrolizumab under [id number]     03/31/21: C2D1 single agent pembrolizumab      04/19/21: Brain MRI: Imaging personally reviewed by me shows an interval decrease in size of right medial occipital mass now measuring 4 mm. Previously noted enhancing focus within the left  frontal lobe is no longer appreciated, apparently resolved.     04/20/21: CT C/A/P:  imaging personally reviewed by me:  The RLL mass has decreased in size -   There are inflammatory changes noted in that lobe -   No new lesions identified -      04/28/21: C3D1 pembrolizumab      05/19/21: C4D1 pembrolizumab      05/28/21: CT C/A/P shows  3.9 cm RLL mass image 202/317, 8 mm subcarinal node image 127/317 that correlates with 30 % or more decrease in diameter  consistent with partial response.      06/09/21: C5D1 pembrolizumab       08/09/21: CT C/A/P continues to have a MS - the RLL mass has decreased further in size (3.6 cm) as well as the level 7 node (7 mm)      08/11/21: C8D1 pembrolizumab      09/01/21: C9D1 pembrolizumab      10/11/21: CT C/A/P shows the RLL mass to measure 2.9 cm (image # 200/303). Level 7node measures 1.2 cm (#123/303). Right hilar node more prominent measuring 1.2 cm (#123/303). 4R 1.0 cm (#72/303).      11/03/21: C12 pembrolizumab under EA [id number]     11/11/21: MRI brain shows JOSE -      11/22/21: C13D1 pembrolizumab     12/13/21: CT C/A/P shows stable findings - the RLL mass measures 2.7 cm - the right paratracheal prominent node is slightly decreased in size      12/15/21: C14D1 pembrolizumab under [id number]     1/5/21: Grade 1 diarrhea irAE - starting  imodium for control. Continuing with C15D1 pembrolizumab today     2/14/2022: CT Chest/Abdomen/Pelvis shows a 2.6 cm RLL mass, down from 2.9 cm in Dec, sub carinal node measures 1.2 cm, prev 1 cm. right axiliary node has become more prominent, now measuring 1.2 cm. unclear if this represents PD (of note, this was never  target lesion) the sum of target lesions has gone down by 50% from baseline     2/18/2022: pt diagnosed with secondary adrenal insufficiency- prescribed hydrocortisone 20 mg in the morning, 10 mg in the evening, referred to endocrinology     02/25/2022: brain mri/sella turcica demonstrates normal sized pituitary gland, small lesion in right posterior occipital lobe, prev treated with no changes      05/03/2022: CT Chest/Abdomen/Pelvis shows decreases in size of RLL mass now measuring 2.2 cm, no evidence of emerging metastatic disease. At this point, pt has achieved a 59% decrease in the sum of all target lesions.      08/19/22: CT C/A/P shows stable changes with no new sites of metastasis      09/01/22: Brain MRI shows no signs of PD      11/11/2022: CT chest/abdomen/pelvis demonstrates soft tissue nodule measuring 1.6 cm in the greater omentum, concerning for peritoneal metastatic disease.  Other findings remain stable.      11/18/2022: PET scan demonstrates the soft tissue nodule in the right superior aspect of the greater omentum to have an SUV of 2.1, which could be inflammatory in nature. Bx requested      12/14/2022: Biopsy of the omental nodule demonstrates fibroadipose tissue with chronic inflammation and scarring, no malignancy identified.  No evidence of confirmed PD.  Patient remains on pembrolizumab.      01/03/2023: MRI brain with and without contrast demonstrates no intracranial metastasis     02/07/2023: CT chest/abdomen/pelvis demonstrates no signs of PD.  Right lower lobe primary lung cancer remains a stable in size.  The omental nodule that was biopsied in the past is fading away,  in concordance with pathology results showing adipose tissue  with inflammation.      2023: Completes 2 years of single agent pembrolizumab under EA [id number]      2020: Brain MRI with no remarkable findings.  No evidence of intracranial metastasis.     2023: CT chest/abdomen/pelvis demonstrates no signs of PD (60% decrease in sum of target lesions c/t baselien).    24: MRI brain demonstrating nodular enhancement in right posterior occipital lobe and adjacent calvarium    24: CT C/A/P shows no signs of PD  24:  CT shows some increase in mediastinal lymph nodes suggestive of progression.     24: PET scan shows diffuse uptake in multiple bilateral mediastinal and hilar nodes, suggestive of an inflammatory process rather than neoplastic - pt referred for bronchoscopy      PAST MEDICAL HISTORY  GERD - taking pantoprazole 40mg daily  subclavian steal syndrome - aspirin 81mg daily, plavix 75mg daily  depression - taking buspirone 5mg once daily  COVID19 vaccination + status s/p booster    SURGICAL HISTORY  Lung and lymph node biopsy  L subclavian artery surgery 2020  Hysterectomy for fibroids 2008  Appendectomy 2014     SOCIAL HISTORY  At the time of her diagnosis, Ms. Sams lives with her partner, Judy, and 2 of her partner's cousins. Has 4 dogs. 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 40 pack-year smoking history, recently quit. No history of alcohol or illicit drug use.      FAMILY HISTORY  Mother - breast cancer b/l diagnosed at age 70s, PAD, blood clots, DM c/b ESRD  Sister - melanoma  Sister - skin cancer  Sister - DM c/b ESRD, blood clots  Brother  of lung cancer in 2023    CURRENT MEDS REVIEWED       ALLERGIES REVIEWED        SUBJECTIVE:  Verbal consent was obtained prior to this telehealth/phone appointment  Patient states she is doing overall well, however stressed out about most recent scan  results  She would not like to take chemotherapy in case disease progresses  She states her appetite has been great, she is struggling with an upper respiratory infection, but overall stamina has been preserved  She is indeed doing a lot of yard work with the warm weather outside, and states she has noted generalized body aches, mostly joints  No diarrhea  She has lost slight amount of weight based on documented weights in her chart over the last 4 months, however TSH is low and free T4 is elevated, suggesting that she may have to go on the lower dose of Synthroid.  She follows closely with endocrinology for that.  There are no new respiratory symptoms    A 13 point review of systems was performed, with significant findings documented above in subjective history.    OBJECTIVE:  There were no vitals filed for this visit.     There is no height or weight on file to calculate BSA.     Wt Readings from Last 5 Encounters:   04/17/24 78.7 kg (173 lb 8 oz)   03/28/24 80.7 kg (178 lb)   03/27/24 81.6 kg (179 lb 12.8 oz)   03/26/24 82.2 kg (181 lb 3.5 oz)   01/30/24 82.3 kg (181 lb 6.4 oz)       ECOGSCORE: 0- Fully active, able to carry on all pre-disease performance w/o restriction.         Diagnostic Results   Results:  Labs:  Lab Results   Component Value Date    WBC 8.6 04/09/2024    HGB 13.2 04/09/2024    HCT 40.5 04/09/2024    MCV 90 04/09/2024     04/09/2024      Lab Results   Component Value Date    NEUTROABS 5.77 04/09/2024        Lab Results   Component Value Date    GLUCOSE 118 (H) 04/09/2024    CALCIUM 9.0 04/09/2024     04/09/2024    K 4.3 04/09/2024    CO2 24 04/09/2024     04/09/2024    BUN 21 04/09/2024    CREATININE 1.03 04/09/2024    MG 1.88 04/09/2024     Lab Results   Component Value Date    ALT 21 04/09/2024    AST 16 04/09/2024    ALKPHOS 86 04/09/2024    BILITOT 0.5 04/09/2024    BILIDIR 0.1 07/24/2023      Lab Results   Component Value Date    ACTH <1.5 (L) 04/09/2024    CORTISOL  28.6 (H) 04/09/2024    TSH 0.07 (L) 04/09/2024    FREET4 1.23 (H) 04/09/2024     STUDY:  MR BRAIN TUMOR PERFUSION PROTOCOL W AND WO IV CONTRAST;  3/26/2024  1:00 pm      INDICATION:  Signs/Symptoms:adenocarcinoma of the right lower lung with  mediastinal lymph node metastases and a single brain metastasis s/p  gamma knife. Short interval scan to assess for progression vs  treatment effect..      COMPARISON:      01/17/2024      ACCESSION NUMBER(S):  [id number]      ORDERING CLINICIAN:  MYNOR SHAFFER      TECHNIQUE:  Axial diffusion, axial T2, axial FLAIR, axial gradient echo T2, axial  T1, post gadolinium volumetric T1, as well as post gadolinium axial  T1 weighted MRI images of the brain were obtained. DCE permeability  and DSC MRI perfusion imaging was also performed. The patient  received  24 mL of  Dotarem gadolinium intravenously.      FINDINGS:      The gradient echo T2 weighted images again demonstrate a small  subcentimeter focus of blooming dark signal along the posterior right  occipital lobe similar in appearance when compared with the prior MRI  dated 01/17/2024 suggestive of hemosiderin deposition anterior  dystrophic calcification/mineralization associated with a previous  treated metastasis. The current post gadolinium images again  demonstrate a small amount of subcentimeter enhancement corresponding  to this region measuring approximately 5 mm in greatest axial  dimension as seen on axial post gadolinium volumetric T1 slice 124 of  240. The enhancement is similar when compared with the prior study  study dated 01/17/2024. The small focus of enhancement remains  nonspecific and could represent post therapy related change although  a small focus of enhancing neoplasm can not be excluded. The small  size of the nonspecific enhancement, location along cortex, and  blooming dark signal on the gradient echo T2 images renders further  evaluation with advanced MRI perfusion imaging nondiagnostic.  A  follow-up MRI of the brain with and without gadolinium in 2-3 months  would be recommended.      The diffusion-weighted images fail to demonstrate evidence of  abnormal diffusion restriction to suggest acute infarction.      The ventricular system remains nondilated without evidence of  hydrocephalus.      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      There is again evidence of a 5 mm focus of enhancement noted within  the right occipital bone as seen on axial post gadolinium fat  saturated T1 slice 21 of 38 unchanged when compared with the prior  study dated 01/17/2024 but new when compared with a prior MRI dated  04/13/2023. While nonspecific, an osseous metastasis must be  considered.      There is minimal mucosal thickening noted within the maxillary  sinuses and scattered ethmoid air cells.      The mastoid air cells are clear.      IMPRESSION:  The gradient echo T2 weighted images again demonstrate a small  subcentimeter focus of blooming dark signal along the posterior right  occipital lobe similar in appearance when compared with the prior MRI  dated 01/17/2024 suggestive of hemosiderin deposition anterior  dystrophic calcification/mineralization associated with a previous  treated metastasis. The current post gadolinium images again  demonstrate a small amount of subcentimeter enhancement corresponding  to this region measuring approximately 5 mm in greatest axial  dimension as seen on axial post gadolinium volumetric T1 slice 124 of  240. The enhancement is similar when compared with the prior study  study dated 01/17/2024. The small focus of enhancement remains  nonspecific and could represent post therapy related change although  a small focus of enhancing neoplasm can not be excluded. The small  size of the nonspecific  enhancement, location along cortex, and  blooming dark signal on the gradient echo T2 images renders further  evaluation with advanced MRI perfusion imaging nondiagnostic. A  follow-up MRI of the brain with and without gadolinium in 2-3 months  would be recommended.      There is again evidence of a 5 mm focus of enhancement noted within  the right occipital bone as seen on axial post gadolinium fat  saturated T1 slice 21 of 38 unchanged when compared with the prior  study dated 01/17/2024 but new when compared with a prior MRI dated  04/13/2023. While nonspecific, an osseous metastasis must be  considered.    Assessment/Plan   #Stage HEAVEN NSCLC with isolated brain metastasis   She remains really well clinically, with an albumin of 4.0, mild decrease in her weight and may be related to thyroid replacement  I reviewed carefully her PET scan and the uptake of multiple nodes mediastinal and bilateral hilar stations, coupled with a very good clinical status suggestive to me of a possible sarcoid-like flare  In this case, would like for her to be scoped: I have referred her to the IP/advanced diagnostics pulmonology clinic.  I will see her after her bronchoscopy, to go over her pathology report, which should take place 1 to 2 weeks after the procedure  In the meantime she should watch for unintentional weight loss, worsening fatigue or any new symptoms are concerning for progression of her disease.  In case progression is confirmed, under EA [id number], she would have to proceed to cytotoxic chemotherapy.     #isolated brain metastasis s/p GKRS with Dr. Peterson  Follows with radiation oncology  MRI brain as above   Reviewed plan with SUHAS Mcmillan-CNP -MRI brain to be repeated under radiation oncology guidance     #Secondary ir-adrenal insufficiency  -currently on hydrocortisone replacement 20 mg AM & 10 mg 1400  -followed by endocrinology  - stable sx - well controlled      # ir-Hypothyroidism   - Managed by endocrinology    -TSH low, free T4 elevated, with associated unintentional weight loss as above; follows with endocrinology closely     #Low vitamin D and B12 levels   Replacement prescribed in the past and she has been compliant.  Most recent results WNL (B12 slightly higher)      # Dry skin - improved  - likely secondary to  ir-AE  - should continue to moisturize with CeraVe BID and apply sunscreen   - call with any news sx such as rash, pruritus   - Advised to use SPF 50 or SPF clothing during summer     #Depression  - multifactorial secondary to combination of current illness, prior illness earlier in the year, health-related stressors at home and hypothyroidism  - has improved with treatment of hypothyroidism and she has been in a great mood over the last several visits, including today.   - taking Wellbutrin (mainly for smoking cessation)  - continue xanax 0.5mg as needed q12h     #h/o subclavian steal syndrome  -continuing ASA, plavix  -Vascular study/US on 02/01/23 demonstrating the left carotid to subclavian artery bypass graft appears widely patent.

## 2024-05-01 NOTE — PROGRESS NOTES
Bronchoscopy Scheduling Request    Pre-bronchoscopy visit: New patient visit with Bronchoscopy group provider  Please schedule procedure: Next available    Cytology on-site:  Yes  Location:  Either location  Performing physician:  Advanced diagnostic bronchoscopist  Referring physician:  Michelle Owen MD, Bahman Montiel DO  Indication:  Stage IV NSCLC, PET avid lymphadenopathy  Sedation / Anesthesia:  GA  Procedure:  Dx EBUS  Time:  Tier 1  Fluorscopy:   No  Imaging needed:  None  Labs:  None  Meds:  Plavix  Special Considerations:  None  Reviewed by:  Serafin Shelley MD

## 2024-05-02 NOTE — PROGRESS NOTES
Patient: Betsy Sams    [id number]  : 1965 -- AGE 58 y.o.    Provider: Sherie DAI- [facility]     Location [facility]   Service Date: 5/3/24              [facility]  New Visit Note    Virtual or Telephone Consent  A telephone visit (audio only) between the patient (at the originating site) and the provider (at the distant site) was utilized to provide this telehealth service.   Verbal consent was requested and obtained from Betsy Sams on this date, 24 for a telehealth visit.     HISTORY OF PRESENT ILLNESS     The patient's referring provider is: No ref. provider found    HISTORY OF PRESENT ILLNESS   Betsy Sams is a 58 y.o. female with a history of NSCLC of RLL in  with mets to brain, DM 2, hypothyroidism, adrenal insufficiency, CKD and steal syndrome s/p subclavian artery bypass grafting, who is a former smoker (~40 pack years), who presents to a [facility] for an initial evaluation for pre-bronchoscopy visit.     I have independently interviewed and examined the patient in the office and reviewed available records.    Last visit date: 2021 with Dr. Jesús Pryor  Current History    On today's visit, the patient reports having a Bronch scheduled for 24 RLL NSCLC.  Patient reports she has an albuterol inhaler that she rarely uses.  Has an occasional dry cough and wheeze.  She does get dyspneic on exertion.  Denies shortness of breath at rest, chest tightness, chest pain and ED visits for breathing issues.    Previous pulmonary history: NSCLC     Inhalers/nebulized medications: Albuterol HFA    Hospitalization History: 2023 - for COVID    Sleep history: Denies snoring, apnea, feeling tired during the day or taking naps during the day.       ALLERGIES AND MEDICATIONS     ALLERGIES  Allergies   Allergen Reactions    Naproxen Unknown       MEDICATIONS  Current Outpatient Medications   Medication  "Sig Dispense Refill    albuterol (Ventolin HFA) 90 mcg/actuation inhaler Inhale 2 puffs every 4 hours if needed for wheezing or shortness of breath. 18 g 1    alcohol swabs pads, medicated use as directed once daily      aspirin 81 mg chewable tablet Chew 1 tablet (81 mg) once daily.      atorvastatin (Lipitor) 20 mg tablet Take 1 tablet (20 mg) by mouth once daily. 90 tablet 1    BD Luer-Haily Syringe 3 mL 25 gauge x 1\" syringe use as directed      capsaicin 0.033 % cream Use with both soles at bedtime 56.6 g 3    clopidogrel (Plavix) 75 mg tablet Take 1 tablet (75 mg) by mouth once daily. 90 tablet 1    cyanocobalamin (Vitamin B-12) 1,000 mcg tablet Take 1 tablet (1,000 mcg) by mouth once daily. 90 tablet 1    dexAMETHasone (Decadron) 4 mg/mL injection INJECT 4MG IN CASE OF EMERGENCY      dulaglutide (Trulicity) 0.75 mg/0.5 mL pen injector Inject 0.75 mg under the skin 1 (one) time per week. 4 each 11    dyclonine (Sucrets Sore Throat) 2 mg lozenge Place 1 lozenge into mouth between cheek and gum every 3 hours if needed (cough). 18 lozenge 1    ergocalciferol (Vitamin D-2) 1.25 MG (42436 UT) capsule Take 1 capsule (50,000 Units) by mouth 1 (one) time per week. 12 capsule 0    hydrocortisone (Cortef) 10 mg tablet 20mg in the morning and 10mg in the afternoon 300 tablet 3    hydrocortisone 2.5 % cream Apply topically 2 times a day as needed for irritation or rash. 28 g 0    ipratropium (Atrovent) 21 mcg (0.03 %) nasal spray Administer 2 sprays into each nostril. 2-3 times daily      levothyroxine (Synthroid, Levoxyl) 150 mcg tablet Take 1 tablet (150 mcg) by mouth every other day. (ALTERNATE WITH 175 MCG) 40 tablet 3    levothyroxine (Synthroid, Levoxyl) 175 mcg tablet Take 1 tablet (175 mcg) by mouth every other day. (ALTERNATE WITH 150 MG)      metFORMIN (Glucophage) 500 mg tablet Take 1 tablet (500 mg) by mouth once daily. Take with food. 90 tablet 1    Microlet Lancet misc use 1 LANCET to TEST BLOOD SUGAR once " daily as directed      ondansetron ODT (Zofran-ODT) 4 mg disintegrating tablet Take 1 tablet (4 mg) by mouth every 8 hours if needed for nausea or vomiting. 30 tablet 0    OneTouch Ultra Test strip use 1 TEST STRIP to TEST BLOOD SUGAR once daily      pantoprazole (ProtoNix) 40 mg EC tablet Take 1 tablet (40 mg) by mouth once daily in the morning. Take before meals. 30 MINUTES PRIOR TO BREAKFAST 90 tablet 1    escitalopram (Lexapro) 5 mg tablet Take 1 tablet (5 mg) by mouth once daily. 90 tablet 1    famotidine (Pepcid) 20 mg tablet Take 1 tablet (20 mg) by mouth 2 times a day. 60 tablet 5    omega-3 acid ethyl esters (Lovaza) 1 gram capsule Take 2 capsules (2 g) by mouth 2 times a day with meals. 120 capsule 3    traZODone (Desyrel) 50 mg tablet Take 1 tablet (50 mg) by mouth as needed at bedtime for sleep. 90 tablet 1     No current facility-administered medications for this visit.         PAST HISTORY     PAST MEDICAL HISTORY  Hypothyroidism   Diabetes Mellitus 2  Lung cancer - 2020 with 2 years of Keytruda -   Brain Fredy s/p radiation brain 2021  CKD Stage 3  Steal syndrome s/p subclavain artery bypass grafting  Adrenal insufficient    PAST SURGICAL HISTORY  Past Surgical History:   Procedure Laterality Date    APPENDECTOMY  12/14/2017    Appendectomy    CT ANGIO NECK  1/6/2020    CT NECK ANGIO W AND WO IV CONTRAST 1/6/2020 [facility]    CT GUIDED PERCUTANEOUS BIOPSY LUNG  12/7/2020    CT GUIDED PERCUTANEOUS BIOPSY LUNG 12/7/2020 [facility]    HYSTERECTOMY  12/14/2017    Hysterectomy    OTHER SURGICAL HISTORY  02/19/2020    Aorta to subclavian and carotid arterial bypass    TONSILLECTOMY  12/14/2017    Tonsillectomy       IMMUNIZATION HISTORY  Immunization History   Administered Date(s) Administered    Moderna SARS-CoV-2 Vaccination 03/23/2021, 04/20/2021, 12/06/2021    Pneumococcal polysaccharide vaccine, 23-valent, age 2 years and older (PNEUMOVAX 23) 02/18/2021    Pneumococcal, Unspecified  "2016    Tdap vaccine, age 7 year and older (BOOSTRIX, ADACEL) 2022       SOCIAL HISTORY  Tobacco Smokin-54 y/o - 1ppd - ~40 pack years  Illicit drugs: none  Alcohol consumption: none  Pets: None    OCCUPATIONAL/ENVIRONMENTAL HISTORY  Occupation: Retail. Disabled.  No known exposure to asbestos, silica, beryllium or inhaled metals.  No exposure to birds or exotic animals.    FAMILY HISTORY  Family History   Problem Relation Name Age of Onset    Other (Cardiac Disorder) Mother Adela casiano     Diabetes Mother Adela casiano     Kidney disease Mother Adela casiano     Breast cancer Mother Adela casiano     Other (Peripheral Arterial Disease) Mother Adela casiano     Colon polyps Mother Adela casiano     Other (Peripheral Arterial Disease) Sister      Hyperlipidemia Other Sibling      Brother - Lung cancer-    No family history of autoimmune disorders.    REVIEW OF SYSTEMS     REVIEW OF SYSTEMS  Review of Systems    Constitutional: No fever, no chills, no night sweats.    Eyes: No double vision, no floaters, no dry eyes.   ENT: See HPI.   Neck: No neck stiffness.  Cardiovascular: No sharp chest pain, no heart racing, no leg swelling.  Respiratory: as noted in HPI.   Gastrointestinal: No nausea, no vomiting, no diarrhea.   Musculoskeletal: No joint pain, no back pain.   Integumentary: No rashes or sores.  Neurological: No dizziness, no headaches. Sleeping well.  Psychiatric: No mood changes.   Endocrine: No hot flashes, no cold intolerance, weight is stable.  Hematologic: No easy bruising or bleeding.    PHYSICAL EXAM     VITAL SIGNS: Ht 1.676 m (5' 6\")   Wt 80.3 kg (177 lb)   BMI 28.57 kg/m²      CURRENT WEIGHT: Body mass index is 28.57 kg/m².  PREVIOUS WEIGHTS:  Wt Readings from Last 3 Encounters:   24 80.3 kg (177 lb)   24 78.7 kg (173 lb 8 oz)   24 80.7 kg (178 lb)       Physical Exam    Constitutional: General appearance: Alert and oriented.  No acute " "distress.   Psychiatric: Intact judgement and insight.    RESULTS/DATA     Pulmonary Function Test Results     Failed to redirect to the Timeline version of the [email or web address] Application SmartLink.      Chest Radiograph     XR CHEST 2 VIEWS 08/08/2023    Narrative  * * *Final Report* * *    DATE OF EXAM: Aug  8 2023 12:25PM    GRX   5291  -  XR CHEST 2V FRONTAL/LAT  / ACCESSION #  [id number]    PROCEDURE REASON: Shortness of breath    * * * * Physician Interpretation * * * *    EXAMINATION:  CHEST RADIOGRAPH (2 VIEW FRONTAL & LATERAL)    CLINICAL HISTORY: Shortness of breath  MQ:  XC2_6    EXAM DATE/TIME:  8/8/2023 12:25 PM    COMPARISON:  No relevant prior studies available.      RESULT:    Lines, tubes, and devices:  None.    Lungs and pleura:  No consolidation. No lung mass. No pleural effusion.  No pneumothorax.    Cardiomediastinal silhouette:  Normal cardiomediastinal silhouette.    Bones and soft tissues:  Unremarkable.    Impression  IMPRESSION:    No acute radiographic abnormality.                : PSCMADELAINE  Transcribe Date/Time: Aug  8 2023 12:26P    Dictated by : DIANE REDD MD    This examination was interpreted and the report reviewed and  electronically signed by:  DIANE REDD MD on Aug  8 2023 12:27PM  EST      Chest CT Scan     No results found for this or any previous visit from the past 365 days.      Echocardiogram     No results found for this or any previous visit from the past 365 days.       Labwork   Complete Blood Count  Lab Results   Component Value Date    WBC 8.6 04/09/2024    HGB 13.2 04/09/2024    HCT 40.5 04/09/2024    MCV 90 04/09/2024     04/09/2024       Peripheral Eosinophil Count/Percentage:   Eosinophils Absolute (x10*3/uL)   Date Value   04/09/2024 0.07     Eosinophils % (%)   Date Value   04/09/2024 0.8       Serum Immunoglobulin E:    No results found for: \"IGE\"     ASSESSMENT/PLAN   Ms. Sams is a 58 y.o. female,     Problem List and Orders  Problem List Items Addressed " This Visit    None  Visit Diagnoses       Lung nodule    -  Primary    Malignant neoplasm of lower lobe of right lung (Multi)                Assessment and Plan / Recommendations:  Problem List Items Addressed This Visit    None  Visit Diagnoses       Lung nodule    -  Primary    Malignant neoplasm of lower lobe of right lung (Multi)             Patient's visit was converted to a telephone visit given current COVID- 19 pandemic.     1. RLL nodule on 5/13/24   - plan for bronchoscopy with biopsy   - labwork prior to procedure    - HOLD Plavix for 5 days before bronch- last dose 5/7/24    I explained the procedure to the patient. We discussed that the bronchoscopy will be performed by a member of the Interventional Pulmonary Team (Dr Jesús Pryor,  Dr. Rosy Salvador, or Dr. Maxwell Reyes) depending on scheduling and provider availability. We also discussed that the IP providers function as a team and not infrequently may have to fill in for one another if there are emergent issues that need attention at the same time. The patient / family expressed understanding and agreed to proceed. All questions were answered.     Thank you for visiting the Pulmonary clinic today!   Sherie Mcdonald CNP  [phone]          Follow up as needed.    If you have any questions please call the office [phone]    Thank you for visiting the Pulmonary clinic today!   Sherie Mcdonald CNP  [phone]

## 2024-05-03 ENCOUNTER — TELEMEDICINE (OUTPATIENT)
Dept: PULMONOLOGY | Facility: CLINIC | Age: 59
End: 2024-05-03
Payer: COMMERCIAL

## 2024-05-03 VITALS — WEIGHT: 177 LBS | HEIGHT: 66 IN | BODY MASS INDEX: 28.45 KG/M2

## 2024-05-03 DIAGNOSIS — R91.1 LUNG NODULE: Primary | ICD-10-CM

## 2024-05-03 DIAGNOSIS — C34.31 MALIGNANT NEOPLASM OF LOWER LOBE OF RIGHT LUNG (MULTI): ICD-10-CM

## 2024-05-03 PROCEDURE — 99204 OFFICE O/P NEW MOD 45 MIN: CPT

## 2024-05-03 PROCEDURE — 3051F HG A1C>EQUAL 7.0%<8.0%: CPT

## 2024-05-03 PROCEDURE — 1036F TOBACCO NON-USER: CPT

## 2024-05-03 PROCEDURE — 3048F LDL-C <100 MG/DL: CPT

## 2024-05-03 ASSESSMENT — PATIENT HEALTH QUESTIONNAIRE - PHQ9
2. FEELING DOWN, DEPRESSED OR HOPELESS: NOT AT ALL
1. LITTLE INTEREST OR PLEASURE IN DOING THINGS: NOT AT ALL
SUM OF ALL RESPONSES TO PHQ9 QUESTIONS 1 AND 2: 0

## 2024-05-03 ASSESSMENT — ENCOUNTER SYMPTOMS
DEPRESSION: 0
OCCASIONAL FEELINGS OF UNSTEADINESS: 0
LOSS OF SENSATION IN FEET: 0

## 2024-05-03 ASSESSMENT — PAIN SCALES - GENERAL: PAINLEVEL: 0-NO PAIN

## 2024-05-08 ENCOUNTER — APPOINTMENT (OUTPATIENT)
Dept: HEMATOLOGY/ONCOLOGY | Facility: CLINIC | Age: 59
End: 2024-05-08
Payer: COMMERCIAL

## 2024-05-10 ENCOUNTER — ANESTHESIA EVENT (OUTPATIENT)
Dept: GASTROENTEROLOGY | Facility: HOSPITAL | Age: 59
End: 2024-05-10
Payer: COMMERCIAL

## 2024-05-10 ENCOUNTER — LAB (OUTPATIENT)
Dept: LAB | Facility: LAB | Age: 59
End: 2024-05-10
Payer: COMMERCIAL

## 2024-05-10 DIAGNOSIS — E03.9 HYPOTHYROIDISM, UNSPECIFIED TYPE: ICD-10-CM

## 2024-05-10 DIAGNOSIS — E55.9 VITAMIN D DEFICIENCY: ICD-10-CM

## 2024-05-10 DIAGNOSIS — Z01.818 PRE-OP TESTING: ICD-10-CM

## 2024-05-10 DIAGNOSIS — E11.65 TYPE 2 DIABETES MELLITUS WITH HYPERGLYCEMIA, WITHOUT LONG-TERM CURRENT USE OF INSULIN (MULTI): ICD-10-CM

## 2024-05-10 DIAGNOSIS — R59.1 LYMPHADENOPATHY: ICD-10-CM

## 2024-05-10 LAB
25(OH)D3 SERPL-MCNC: 37 NG/ML (ref 30–100)
ALBUMIN SERPL BCP-MCNC: 4 G/DL (ref 3.4–5)
ANION GAP SERPL CALC-SCNC: 12 MMOL/L (ref 10–20)
BUN SERPL-MCNC: 23 MG/DL (ref 6–23)
C PEPTIDE SERPL-MCNC: 6.5 NG/ML (ref 0.7–3.9)
CALCIUM SERPL-MCNC: 9.8 MG/DL (ref 8.6–10.3)
CHLORIDE SERPL-SCNC: 104 MMOL/L (ref 98–107)
CHOLEST SERPL-MCNC: 162 MG/DL (ref 0–199)
CHOLESTEROL/HDL RATIO: 3.2
CO2 SERPL-SCNC: 29 MMOL/L (ref 21–32)
CREAT SERPL-MCNC: 0.95 MG/DL (ref 0.5–1.05)
CREAT UR-MCNC: 185.8 MG/DL (ref 20–320)
EGFRCR SERPLBLD CKD-EPI 2021: 69 ML/MIN/1.73M*2
ERYTHROCYTE [DISTWIDTH] IN BLOOD BY AUTOMATED COUNT: 12.5 % (ref 11.5–14.5)
EST. AVERAGE GLUCOSE BLD GHB EST-MCNC: 131 MG/DL
GLUCOSE SERPL-MCNC: 100 MG/DL (ref 74–99)
HBA1C MFR BLD: 6.2 %
HCT VFR BLD AUTO: 43.4 % (ref 36–46)
HDLC SERPL-MCNC: 50.2 MG/DL
HGB BLD-MCNC: 13.9 G/DL (ref 12–16)
LDLC SERPL CALC-MCNC: 65 MG/DL
MCH RBC QN AUTO: 29.7 PG (ref 26–34)
MCHC RBC AUTO-ENTMCNC: 32 G/DL (ref 32–36)
MCV RBC AUTO: 93 FL (ref 80–100)
MICROALBUMIN UR-MCNC: 7.1 MG/L
MICROALBUMIN/CREAT UR: 3.8 UG/MG CREAT
NON HDL CHOLESTEROL: 112 MG/DL (ref 0–149)
NRBC BLD-RTO: 0 /100 WBCS (ref 0–0)
PHOSPHATE SERPL-MCNC: 5.7 MG/DL (ref 2.5–4.9)
PLATELET # BLD AUTO: 269 X10*3/UL (ref 150–450)
POTASSIUM SERPL-SCNC: 4.5 MMOL/L (ref 3.5–5.3)
RBC # BLD AUTO: 4.68 X10*6/UL (ref 4–5.2)
SODIUM SERPL-SCNC: 140 MMOL/L (ref 136–145)
T4 FREE SERPL-MCNC: 1.02 NG/DL (ref 0.61–1.12)
TRIGL SERPL-MCNC: 232 MG/DL (ref 0–149)
TSH SERPL-ACNC: 0.22 MIU/L (ref 0.44–3.98)
VIT B12 SERPL-MCNC: 588 PG/ML (ref 211–911)
VLDL: 46 MG/DL (ref 0–40)
WBC # BLD AUTO: 7.9 X10*3/UL (ref 4.4–11.3)

## 2024-05-10 PROCEDURE — 85027 COMPLETE CBC AUTOMATED: CPT

## 2024-05-10 PROCEDURE — 82043 UR ALBUMIN QUANTITATIVE: CPT

## 2024-05-10 PROCEDURE — 83036 HEMOGLOBIN GLYCOSYLATED A1C: CPT

## 2024-05-10 PROCEDURE — 84443 ASSAY THYROID STIM HORMONE: CPT

## 2024-05-10 PROCEDURE — 80061 LIPID PANEL: CPT

## 2024-05-10 PROCEDURE — 82607 VITAMIN B-12: CPT

## 2024-05-10 PROCEDURE — 84439 ASSAY OF FREE THYROXINE: CPT

## 2024-05-10 PROCEDURE — 82570 ASSAY OF URINE CREATININE: CPT

## 2024-05-10 PROCEDURE — 84681 ASSAY OF C-PEPTIDE: CPT

## 2024-05-10 PROCEDURE — 82306 VITAMIN D 25 HYDROXY: CPT

## 2024-05-10 PROCEDURE — 80069 RENAL FUNCTION PANEL: CPT

## 2024-05-10 PROCEDURE — 36415 COLL VENOUS BLD VENIPUNCTURE: CPT

## 2024-05-13 ENCOUNTER — HOSPITAL ENCOUNTER (OUTPATIENT)
Dept: GASTROENTEROLOGY | Facility: HOSPITAL | Age: 59
Setting detail: OUTPATIENT SURGERY
Discharge: HOME | End: 2024-05-13
Payer: COMMERCIAL

## 2024-05-13 ENCOUNTER — ANESTHESIA (OUTPATIENT)
Dept: GASTROENTEROLOGY | Facility: HOSPITAL | Age: 59
End: 2024-05-13
Payer: COMMERCIAL

## 2024-05-13 VITALS
HEART RATE: 61 BPM | TEMPERATURE: 97.3 F | HEIGHT: 66 IN | BODY MASS INDEX: 27.99 KG/M2 | RESPIRATION RATE: 20 BRPM | DIASTOLIC BLOOD PRESSURE: 75 MMHG | WEIGHT: 174.16 LBS | OXYGEN SATURATION: 97 % | SYSTOLIC BLOOD PRESSURE: 117 MMHG

## 2024-05-13 DIAGNOSIS — R59.1 LYMPHADENOPATHY: ICD-10-CM

## 2024-05-13 LAB
GLUCOSE BLD MANUAL STRIP-MCNC: 111 MG/DL (ref 74–99)
GLUCOSE BLD MANUAL STRIP-MCNC: 98 MG/DL (ref 74–99)

## 2024-05-13 PROCEDURE — 82947 ASSAY GLUCOSE BLOOD QUANT: CPT | Mod: 91

## 2024-05-13 PROCEDURE — 2500000005 HC RX 250 GENERAL PHARMACY W/O HCPCS: Performed by: ANESTHESIOLOGY

## 2024-05-13 PROCEDURE — 3700000001 HC GENERAL ANESTHESIA TIME - INITIAL BASE CHARGE: Performed by: STUDENT IN AN ORGANIZED HEALTH CARE EDUCATION/TRAINING PROGRAM

## 2024-05-13 PROCEDURE — 88172 CYTP DX EVAL FNA 1ST EA SITE: CPT | Performed by: PATHOLOGY

## 2024-05-13 PROCEDURE — 88305 TISSUE EXAM BY PATHOLOGIST: CPT | Performed by: PATHOLOGY

## 2024-05-13 PROCEDURE — 2720000007 HC OR 272 NO HCPCS: Performed by: STUDENT IN AN ORGANIZED HEALTH CARE EDUCATION/TRAINING PROGRAM

## 2024-05-13 PROCEDURE — 7100000010 HC PHASE TWO TIME - EACH INCREMENTAL 1 MINUTE: Performed by: STUDENT IN AN ORGANIZED HEALTH CARE EDUCATION/TRAINING PROGRAM

## 2024-05-13 PROCEDURE — 7100000002 HC RECOVERY ROOM TIME - EACH INCREMENTAL 1 MINUTE: Performed by: STUDENT IN AN ORGANIZED HEALTH CARE EDUCATION/TRAINING PROGRAM

## 2024-05-13 PROCEDURE — 7100000009 HC PHASE TWO TIME - INITIAL BASE CHARGE: Performed by: STUDENT IN AN ORGANIZED HEALTH CARE EDUCATION/TRAINING PROGRAM

## 2024-05-13 PROCEDURE — 31653 BRONCH EBUS SAMPLNG 3/> NODE: CPT | Performed by: STUDENT IN AN ORGANIZED HEALTH CARE EDUCATION/TRAINING PROGRAM

## 2024-05-13 PROCEDURE — 2500000004 HC RX 250 GENERAL PHARMACY W/ HCPCS (ALT 636 FOR OP/ED): Performed by: NURSE ANESTHETIST, CERTIFIED REGISTERED

## 2024-05-13 PROCEDURE — 3700000002 HC GENERAL ANESTHESIA TIME - EACH INCREMENTAL 1 MINUTE: Performed by: STUDENT IN AN ORGANIZED HEALTH CARE EDUCATION/TRAINING PROGRAM

## 2024-05-13 PROCEDURE — 7100000001 HC RECOVERY ROOM TIME - INITIAL BASE CHARGE: Performed by: STUDENT IN AN ORGANIZED HEALTH CARE EDUCATION/TRAINING PROGRAM

## 2024-05-13 PROCEDURE — 2500000005 HC RX 250 GENERAL PHARMACY W/O HCPCS: Performed by: NURSE ANESTHETIST, CERTIFIED REGISTERED

## 2024-05-13 PROCEDURE — 88173 CYTOPATH EVAL FNA REPORT: CPT | Performed by: PATHOLOGY

## 2024-05-13 PROCEDURE — 88305 TISSUE EXAM BY PATHOLOGIST: CPT | Mod: TC,91 | Performed by: STUDENT IN AN ORGANIZED HEALTH CARE EDUCATION/TRAINING PROGRAM

## 2024-05-13 RX ORDER — OXYCODONE HYDROCHLORIDE 5 MG/1
5 TABLET ORAL EVERY 4 HOURS PRN
Status: DISCONTINUED | OUTPATIENT
Start: 2024-05-13 | End: 2024-05-14 | Stop reason: HOSPADM

## 2024-05-13 RX ORDER — PROPOFOL 10 MG/ML
INJECTION, EMULSION INTRAVENOUS AS NEEDED
Status: DISCONTINUED | OUTPATIENT
Start: 2024-05-13 | End: 2024-05-13

## 2024-05-13 RX ORDER — LIDOCAINE HYDROCHLORIDE 20 MG/ML
INJECTION, SOLUTION INFILTRATION; PERINEURAL AS NEEDED
Status: DISCONTINUED | OUTPATIENT
Start: 2024-05-13 | End: 2024-05-13

## 2024-05-13 RX ORDER — HYDRALAZINE HYDROCHLORIDE 20 MG/ML
5 INJECTION INTRAMUSCULAR; INTRAVENOUS EVERY 30 MIN PRN
Status: DISCONTINUED | OUTPATIENT
Start: 2024-05-13 | End: 2024-05-14 | Stop reason: HOSPADM

## 2024-05-13 RX ORDER — ROCURONIUM BROMIDE 10 MG/ML
INJECTION, SOLUTION INTRAVENOUS AS NEEDED
Status: DISCONTINUED | OUTPATIENT
Start: 2024-05-13 | End: 2024-05-13

## 2024-05-13 RX ORDER — PHENYLEPHRINE HCL IN 0.9% NACL 0.4MG/10ML
SYRINGE (ML) INTRAVENOUS AS NEEDED
Status: DISCONTINUED | OUTPATIENT
Start: 2024-05-13 | End: 2024-05-13

## 2024-05-13 RX ORDER — SODIUM CHLORIDE, SODIUM LACTATE, POTASSIUM CHLORIDE, CALCIUM CHLORIDE 600; 310; 30; 20 MG/100ML; MG/100ML; MG/100ML; MG/100ML
100 INJECTION, SOLUTION INTRAVENOUS CONTINUOUS
Status: DISCONTINUED | OUTPATIENT
Start: 2024-05-13 | End: 2024-05-14 | Stop reason: HOSPADM

## 2024-05-13 RX ORDER — PROPOFOL 10 MG/ML
INJECTION, EMULSION INTRAVENOUS CONTINUOUS PRN
Status: DISCONTINUED | OUTPATIENT
Start: 2024-05-13 | End: 2024-05-13

## 2024-05-13 RX ORDER — MIDAZOLAM HYDROCHLORIDE 1 MG/ML
INJECTION INTRAMUSCULAR; INTRAVENOUS AS NEEDED
Status: DISCONTINUED | OUTPATIENT
Start: 2024-05-13 | End: 2024-05-13

## 2024-05-13 RX ORDER — ONDANSETRON HYDROCHLORIDE 2 MG/ML
4 INJECTION, SOLUTION INTRAVENOUS ONCE AS NEEDED
Status: DISCONTINUED | OUTPATIENT
Start: 2024-05-13 | End: 2024-05-14 | Stop reason: HOSPADM

## 2024-05-13 RX ADMIN — PROPOFOL 150 MCG/KG/MIN: 10 INJECTION, EMULSION INTRAVENOUS at 08:47

## 2024-05-13 RX ADMIN — Medication 100 MCG: at 09:00

## 2024-05-13 RX ADMIN — ROCURONIUM BROMIDE 30 MG: 10 INJECTION, SOLUTION INTRAVENOUS at 08:43

## 2024-05-13 RX ADMIN — MIDAZOLAM HYDROCHLORIDE 2 MG: 1 INJECTION INTRAMUSCULAR; INTRAVENOUS at 08:43

## 2024-05-13 RX ADMIN — LIDOCAINE HYDROCHLORIDE 50 MG: 20 INJECTION, SOLUTION INFILTRATION; PERINEURAL at 08:43

## 2024-05-13 RX ADMIN — Medication 6 L/MIN: at 09:22

## 2024-05-13 RX ADMIN — ROCURONIUM BROMIDE 20 MG: 10 INJECTION, SOLUTION INTRAVENOUS at 08:55

## 2024-05-13 RX ADMIN — PROPOFOL 150 MG: 10 INJECTION, EMULSION INTRAVENOUS at 08:43

## 2024-05-13 RX ADMIN — SUGAMMADEX 200 MG: 100 INJECTION, SOLUTION INTRAVENOUS at 09:14

## 2024-05-13 SDOH — HEALTH STABILITY: MENTAL HEALTH: CURRENT SMOKER: 0

## 2024-05-13 ASSESSMENT — PAIN - FUNCTIONAL ASSESSMENT
PAIN_FUNCTIONAL_ASSESSMENT: 0-10
PAIN_FUNCTIONAL_ASSESSMENT: UNABLE TO SELF-REPORT
PAIN_FUNCTIONAL_ASSESSMENT: 0-10

## 2024-05-13 ASSESSMENT — PAIN SCALES - GENERAL
PAINLEVEL_OUTOF10: 0 - NO PAIN

## 2024-05-13 ASSESSMENT — COLUMBIA-SUICIDE SEVERITY RATING SCALE - C-SSRS
6. HAVE YOU EVER DONE ANYTHING, STARTED TO DO ANYTHING, OR PREPARED TO DO ANYTHING TO END YOUR LIFE?: NO
2. HAVE YOU ACTUALLY HAD ANY THOUGHTS OF KILLING YOURSELF?: NO
1. IN THE PAST MONTH, HAVE YOU WISHED YOU WERE DEAD OR WISHED YOU COULD GO TO SLEEP AND NOT WAKE UP?: NO

## 2024-05-13 NOTE — DISCHARGE INSTRUCTIONS
The anesthetics, sedatives or narcotics which were given to you today will be acting in your body for the next 24 hours, so you might feel a little sleepy or groggy. This feeling should slowly wear off.   Carefully read and follow the instructions below:   You received sedation today.   Do not drive or operate machinery or power tools of any kind.   No alcoholic beverages today, not even beer or wine.   No over the counter medications that contain alcohol or may cause drowsiness.   Do not make important decisions or sign legal documents.     Do not use Aspirin containing products or non-steroidal medications for the next 24 hours.  (Examples of these types of medications include: Advil, Aleve, Ecotrin, Ibuprofen, Motrin or Naprosyn.  This list is not all-inclusive.  Check with your physician or pharmacist before resuming these medications.)  Tylenol, cough medicine, cough drops or throat lozenges may be used when you are allowed to resume eating and drinking.     Call your physician if any of these symptoms occur:   High fever over 101 degrees or chills (a low grade fever is common for 24 hours)   Rash or hives   Persistent nausea or vomiting   Inability to urinate within 8 hours after the procedure  Go directly to the emergency room if you notice any of the following:   Shortness of breath   Chest pain  Coughing up large amounts of bright red blood greater than a teaspoonful of blood clots (about a teaspoonful for the next 24-48 hours is normal, especially if you had a biopsy)  Resume all normal medications unless directed otherwise by your doctor.     Your doctor recommends these additional instructions:    Follow up with your referring physician as previously scheduled.    If you experience any problems or have any questions following discharge, please call:   Before 5 pm: [phone]   After 5pm and on weekends: [phone] / [phone] and ask for the Pulmonary Fellow on-call (Pager Number: [phone])

## 2024-05-13 NOTE — ANESTHESIA PREPROCEDURE EVALUATION
Patient: Betsy Sams    Procedure Information       Date/Time: 05/13/24 0900    Scheduled providers: Serafin Shelley MD; IRENA Wolff; Adelia Martinez MD    Procedure: BRONCHOSCOPY    Location: [facility]                                                           Pre-Anesthesia Evaluation      Betsy Sams is a 58 y.o. female who presents for procedure stated above.       Relevant Problems   Cardiac   (+) Hypertriglyceridemia   (+) PAD (peripheral artery disease) (CMS-HCC)      Pulmonary   (+) Adenocarcinoma, lung (Multi)   (+) TOAN (obstructive sleep apnea)   (+) Stage 4 lung cancer (Multi)      Neuro   (+) Anxiety   (+) Depression   (+) Lung cancer metastatic to brain (Multi)   (+) Metastatic cancer to brain (Multi)      GI   (+) GERD (gastroesophageal reflux disease)   (+) IBS (irritable bowel syndrome)      Liver   (+) Gallbladder sludge      Endocrine   (+) Hypothyroidism due to medication   (+) Type 2 diabetes mellitus with stage 3 chronic kidney disease, without long-term current use of insulin, unspecified whether stage 3a or 3b CKD (Multi)      GYN   (+) History of hysterectomy      ENT   (+) BPPV (benign paroxysmal positional vertigo)       Past Surgical History:   Procedure Laterality Date    APPENDECTOMY  12/14/2017    Appendectomy    CT ANGIO NECK  1/6/2020    CT NECK ANGIO W AND WO IV CONTRAST 1/6/2020 [facility]    CT GUIDED PERCUTANEOUS BIOPSY LUNG  12/7/2020    CT GUIDED PERCUTANEOUS BIOPSY LUNG 12/7/2020 [facility]    HYSTERECTOMY  12/14/2017    Hysterectomy    OTHER SURGICAL HISTORY  02/19/2020    Aorta to subclavian and carotid arterial bypass    TONSILLECTOMY  12/14/2017    Tonsillectomy     Social History     Tobacco Use    Smoking status: Former     Current packs/day: 1.00     Average packs/day: 1 pack/day for 40.0 years (40.0 ttl pk-yrs)     Types: Cigarettes    Smokeless tobacco: Never   Vaping Use    Vaping status: Never Used   Substance Use Topics  "   Alcohol use: Not Currently    Drug use: Never     Allergies   Allergen Reactions    Naproxen Nausea/vomiting     Current Outpatient Medications   Medication Instructions    albuterol (Ventolin HFA) 90 mcg/actuation inhaler 2 puffs, inhalation, Every 4 hours PRN    alcohol swabs pads, medicated use as directed once daily    aspirin 81 mg, oral, Daily RT    atorvastatin (LIPITOR) 20 mg, oral, Daily    BD Luer-Haily Syringe 3 mL 25 gauge x 1\" syringe use as directed    capsaicin 0.033 % cream Use with both soles at bedtime    clopidogrel (PLAVIX) 75 mg, oral, Daily    cyanocobalamin (VITAMIN B-12) 1,000 mcg, oral, Daily    dexAMETHasone (Decadron) 4 mg/mL injection INJECT 4MG IN CASE OF EMERGENCY    dyclonine (Sucrets Sore Throat) 2 mg lozenge 1 lozenge, mucous membrane, Every 3 hours PRN    ergocalciferol (VITAMIN D-2) 50,000 Units, oral, Once Weekly    escitalopram (LEXAPRO) 5 mg, oral, Daily    famotidine (PEPCID) 20 mg, oral, 2 times daily    hydrocortisone (Cortef) 10 mg tablet 20mg in the morning and 10mg in the afternoon    hydrocortisone 2.5 % cream Topical, 2 times daily PRN    ipratropium (Atrovent) 21 mcg (0.03 %) nasal spray 2 sprays, Each Nostril, 2-3 times daily    levothyroxine (SYNTHROID, LEVOXYL) 175 mcg, oral, Every other day, (ALTERNATE WITH 150 MG)    levothyroxine (SYNTHROID, LEVOXYL) 150 mcg, oral, Every other day, (ALTERNATE WITH 175 MCG)    metFORMIN (GLUCOPHAGE) 500 mg, oral, Daily, Take with food.    Microlet Lancet misc use 1 LANCET to TEST BLOOD SUGAR once daily as directed    omega-3 acid ethyl esters (LOVAZA) 2 g, oral, 2 times daily with meals    ondansetron ODT (ZOFRAN-ODT) 4 mg, oral, Every 8 hours PRN    OneTouch Ultra Test strip use 1 TEST STRIP to TEST BLOOD SUGAR once daily    pantoprazole (PROTONIX) 40 mg, oral, Daily before breakfast, 30 MINUTES PRIOR TO BREAKFAST    traZODone (DESYREL) 50 mg, oral, Nightly PRN    Trulicity 0.75 mg, subcutaneous, Once Weekly       Relevant " Results reviewed  Recent Labs     05/10/24  0852 04/09/24  1323 01/17/24  0745   WBC 7.9 8.6 9.4   HGB 13.9 13.2 14.6   HCT 43.4 40.5 44.8    272 305   MCV 93 90 93       Results from last 7 days   Lab Units 05/10/24  0852   ANION GAP mmol/L 12   BUN mg/dL 23   CO2 mmol/L 29   CREATININE mg/dL 0.95   POTASSIUM mmol/L 4.5   SODIUM mmol/L 140   CHLORIDE mmol/L 104   PHOSPHORUS mg/dL 5.7*   GLUCOSE mg/dL 100*   CALCIUM mg/dL 9.8   EGFR mL/min/1.73m*2 69   ALBUMIN g/dL 4.0   VIT D 25 HYDROXY ng/mL 37     Recent Labs     05/10/24  0852 01/17/24  0745 07/24/23  0849   HGBA1C 6.2* 7.2* 6.9*         Recent Labs     05/10/24  0852 04/09/24  1323 01/17/24  0745   TSH 0.22* 0.07* 0.82   FREET4 1.02 1.23* 1.02                 Past Cardiology Tests (Last 3 Years):  EKG:  No results found for this or any previous visit.    No results found for this or any previous visit.        === 03/26/24 ===    MR BRAIN TUMOR PERFUSION PROTOCOL W AND WO CONTRAST    - Impression -  The gradient echo T2 weighted images again demonstrate a small  subcentimeter focus of blooming dark signal along the posterior right  occipital lobe similar in appearance when compared with the prior MRI  dated 01/17/2024 suggestive of hemosiderin deposition anterior  dystrophic calcification/mineralization associated with a previous  treated metastasis. The current post gadolinium images again  demonstrate a small amount of subcentimeter enhancement corresponding  to this region measuring approximately 5 mm in greatest axial  dimension as seen on axial post gadolinium volumetric T1 slice 124 of  240. The enhancement is similar when compared with the prior study  study dated 01/17/2024. The small focus of enhancement remains  nonspecific and could represent post therapy related change although  a small focus of enhancing neoplasm can not be excluded. The small  size of the nonspecific enhancement, location along cortex, and  blooming dark signal on the  gradient echo T2 images renders further  evaluation with advanced MRI perfusion imaging nondiagnostic. A  follow-up MRI of the brain with and without gadolinium in 2-3 months  would be recommended.    There is again evidence of a 5 mm focus of enhancement noted within  the right occipital bone as seen on axial post gadolinium fat  saturated T1 slice 21 of 38 unchanged when compared with the prior  study dated 01/17/2024 but new when compared with a prior MRI dated  04/13/2023. While nonspecific, an osseous metastasis must be  considered.    MACRO:  None.    Signed by: Bahman Nicole 3/26/2024 1:54 PM  Dictation workstation:   [id number]  === 04/12/24 ===    CT ABDOMEN W IV CONTRAST    - Impression -  CHEST:  1.  Minimally increased right lower lobe spiculated nodule, now 16 x  10 mm, previously 15 x 10 mm, since 01/19/2024.  2. Interval increase in the size of several hilar and mediastinal  lymph nodes.    ABDOMEN:  1.  No evidence of metastatic disease in the abdomen.      === 04/26/24 ===    NM PET CT LUNG CA STAGING    - Impression -  1. Interval increase in hypermetabolic activity within a right lower  lobe pulmonary nodule consistent with worsening residual viable  neoplasm.  2. Interval development of hypermetabolic mediastinal lymph nodes in  a pattern which can be seen the setting of sarcoid, however due to  the interval increase in hypermetabolic activity within a right lower  lobe pulmonary nodule, are favored to represent metastatic  lymphadenopathy.  3. Mildly hypermetabolic bibasilar atelectasis with a questionable  focal hypermetabolic nodule in the right lower lobe which is favored  to represent infectious/inflammatory changes, however continued  attention on follow-up imaging is recommended.  4. Interval decrease in hypermetabolic activity along the left  anterior chest wall, favored to represent interval improvement in  inflammatory changes.          Visit Vitals  /74   Pulse 68   Temp 36.6  "°C (97.9 °F) (Temporal)   Resp 18   Ht 1.676 m (5' 6\")   Wt 79 kg (174 lb 2.6 oz)   SpO2 98%   BMI 28.11 kg/m²   OB Status Hysterectomy   Smoking Status Former   BSA 1.92 m²                     Clinical information reviewed:                   NPO Detail:  No data recorded     Physical Exam    Airway  Mallampati: I  TM distance: >3 FB  Neck ROM: full     Cardiovascular   Rhythm: regular  Rate: normal     Dental    Pulmonary   Breath sounds clear to auscultation     Abdominal            Anesthesia Plan    History of general anesthesia?: yes  History of complications of general anesthesia?: no    ASA 4     general   (General with ETT. Standard ASA monitoring.)  The patient is not a current smoker.    intravenous induction   Postoperative administration of opioids is intended.  Anesthetic plan and risks discussed with patient.    Plan discussed with CAA and CRNA.      "

## 2024-05-13 NOTE — ANESTHESIA PROCEDURE NOTES
Airway  Date/Time: 5/13/2024 8:44 AM  Urgency: elective    Airway not difficult    Staffing  Performed: CRNA   Authorized by: Adelia Martinez MD    Performed by: SUHAS Alamo-CHAN  Patient location during procedure: OR    Indications and Patient Condition  Indications for airway management: anesthesia and airway protection  Spontaneous ventilation: present  Sedation level: minimal  Preoxygenated: yes  Patient position: sniffing  Mask difficulty assessment: 1 - vent by mask    Final Airway Details  Final airway type: endotracheal airway      Successful airway: ETT  Cuffed: yes   Successful intubation technique: direct laryngoscopy  Endotracheal tube insertion site: oral  Blade: Dontae  Blade size: #4  ETT size (mm): 8.0  Cormack-Lehane Classification: grade I - full view of glottis  Placement verified by: chest auscultation, bronchoscopy and capnometry   Measured from: gums  ETT to gums (cm): 21  Number of attempts at approach: 1

## 2024-05-13 NOTE — ANESTHESIA POSTPROCEDURE EVALUATION
Patient: Betsy Sams    Procedure Summary       Date: 05/13/24 Room / Location: [facility]    Anesthesia Start: 0841 Anesthesia Stop: 0926    Procedure: BRONCHOSCOPY Diagnosis: Lymphadenopathy    Scheduled Providers: Serafin Shelley MD; IRENA Wolff; Adelia Martinez MD Responsible Provider: Adelia Martinez MD    Anesthesia Type: general ASA Status: 4            Anesthesia Type: general    Vitals Value Taken Time   /60 05/13/24 1017   Temp 36.4 °C (97.5 °F) 05/13/24 1000   Pulse 58 05/13/24 1028   Resp 19 05/13/24 1015   SpO2 98 % 05/13/24 1028   Vitals shown include unfiled device data.    Anesthesia Post Evaluation    Patient location during evaluation: PACU  Patient participation: complete - patient participated  Level of consciousness: awake  Pain management: satisfactory to patient  Multimodal analgesia pain management approach  Airway patency: patent  Cardiovascular status: hemodynamically stable  Respiratory status: spontaneous ventilation  Hydration status: acceptable  Postoperative Nausea and Vomiting: none        No notable events documented.

## 2024-05-13 NOTE — PERIOPERATIVE NURSING NOTE
0922 Pt arrived to [facility] at this time, report received from OR and anesthesia staff. Phase 1 care started. Assuming care of pt at this time. Bedside report received from outgoing RN.     0930 Blood Glucose checked, 111, O2 discontinued on room air, denies pain or nausea.    0937 Message sent to friend via text at this time.     0940 Pt given water, well tolerated, denies pain or nausea.    0950 pt given jello at this time, denies pain or nausea.    1000 maliha crackers given, well tolerated, denies pain or nausea.    1015 no changes at this time     1025 pt without 02 without pox drop, anesthesia notified,  pt ok to move to phase 2    1025 Dr into to see patient and discuss todays procedure     1028 pt meets discharge criteria from phase 1     1030 pt into phase 2    1034 Pt assisted with dressing with help of family. IV removed dressing applied.     1037 Discharge instructions provided to pt and family. Educated on medications, effects of anesthesia, and homecoming care. Pt and family verbalizing understanding of all instructions provided at this time. All questions and concerns answered. Pt given contact information for provider.    1043  Pt assisted to main lobby via  by transport. Dc in stable condition to home. All belongings taken with pt.

## 2024-05-13 NOTE — LETTER
Dear, Betsy Sams      5/2/2024                                              INFORMATION FOR YOUR PROCEDURE     INSTRUCTIONS MUST BE FOLLOWED OR YOU RUN THE RISK OF YOUR CASE BEING CANCELED     Information is attached to this e-mail for your upcoming procedure. (Look for the paperclip in your email to access this information)  Lab requisitions are also included as well as procedural instructions.    You are scheduled for your Bronchoscopy on  5/13/24,    with Dr. Georgina Prado    Arrival is at  7:30  AM,  for Check In prior to your actual procedure time. This allows us to prepare you for your procedure.  Location:   [place] [address]    Check In:  2nd Floor OR Waiting Room   Approach the  for Check In     Patient information is right there if assistance is needed.    NPO (No food or drink) after midnight the night before your procedure. This includes coffee, water, and soda, hard candy, gum or mints.  If taking your morning medications, a small sip of water is allowed to get the medication down.    For your safety, you must have a responsible, adult  accompany you to your procedure.  You will not be permitted to drive yourself home if you have received any type of anesthesia or sedation.    Automated calls about your upcoming scheduled appointments can be quite confusing for patients.    The times may vary depending on what you have scheduled for procedure day. Follow the time/s I have given you  so there is no confusion.      Blood work will need to be done prior to your procedure, preferably at a  Facility.  There are no restrictions for testing. I have attached a requisition for you.     Last dose of Plavix will be on Tuesday, 5/7/24.  We have reached out to your Physician for  Permission to hold the medication    Our Nurse Practitioner,  David Mcdonald, , CNP, will call you on 5/3/24, @ 3:00 PM    This is a phone visit to  go over your medical history prior to your procedure, and is a necessary part of your workup.  The patient must be present at this phone visit.    Please reach out to me with any questions you may have Valentine @ [phone] or   Agustin @ [phone]    Have a nice day!    Valentine Adler    Bronchoscopy   Interventional Pulmonology    MD Georgina Soriano MD Sameer Avasarala, MD Catalina Teba, MD Andrew Dunatchik, MD        [facility]  Pulmonary, Critical Care and Sleep Medicine  [address] [place] [address]  P -[phone]   [phone]  [email or web address]        10-Aug-2018 19:44

## 2024-05-14 ASSESSMENT — PAIN SCALES - GENERAL: PAINLEVEL_OUTOF10: 0 - NO PAIN

## 2024-05-15 LAB
LABORATORY COMMENT REPORT: NORMAL
LABORATORY COMMENT REPORT: NORMAL
PATH REPORT.FINAL DX SPEC: NORMAL
PATH REPORT.GROSS SPEC: NORMAL
PATH REPORT.INTRAOP OBS SPEC DOC: NORMAL
PATH REPORT.RELEVANT HX SPEC: NORMAL
PATH REPORT.TOTAL CANCER: NORMAL

## 2024-05-17 PROCEDURE — RXMED WILLOW AMBULATORY MEDICATION CHARGE

## 2024-05-20 ENCOUNTER — PHARMACY VISIT (OUTPATIENT)
Dept: PHARMACY | Facility: CLINIC | Age: 59
End: 2024-05-20
Payer: COMMERCIAL

## 2024-05-21 ENCOUNTER — TELEPHONE (OUTPATIENT)
Dept: PRIMARY CARE | Facility: CLINIC | Age: 59
End: 2024-05-21
Payer: MEDICARE

## 2024-05-21 DIAGNOSIS — E55.9 VITAMIN D DEFICIENCY: ICD-10-CM

## 2024-05-21 NOTE — TELEPHONE ENCOUNTER
Pt called in requesting a refill on:    ergocalciferol (Vitamin D-2) 1.25 MG (44531 UT) capsule   Take 1 capsule (50,000 Units) by mouth 1 (one) time per week.   Quantity: 12 capsule     [id number] - [place] -    Phone: [phone]

## 2024-05-22 RX ORDER — ERGOCALCIFEROL 1.25 MG/1
50000 CAPSULE ORAL
Qty: 12 CAPSULE | Refills: 0 | Status: SHIPPED | OUTPATIENT
Start: 2024-05-26 | End: 2024-08-24

## 2024-05-28 ENCOUNTER — APPOINTMENT (OUTPATIENT)
Dept: PRIMARY CARE | Facility: CLINIC | Age: 59
End: 2024-05-28
Payer: MEDICARE

## 2024-06-02 ASSESSMENT — PROMIS GLOBAL HEALTH SCALE
RATE_MENTAL_HEALTH: GOOD
RATE_PHYSICAL_HEALTH: GOOD
CARRYOUT_PHYSICAL_ACTIVITIES: COMPLETELY
RATE_SOCIAL_SATISFACTION: GOOD
RATE_AVERAGE_PAIN: 3
EMOTIONAL_PROBLEMS: SOMETIMES
RATE_AVERAGE_FATIGUE: MILD
RATE_GENERAL_HEALTH: GOOD
CARRYOUT_SOCIAL_ACTIVITIES: VERY GOOD
RATE_QUALITY_OF_LIFE: GOOD

## 2024-06-03 ENCOUNTER — OFFICE VISIT (OUTPATIENT)
Dept: PRIMARY CARE | Facility: CLINIC | Age: 59
End: 2024-06-03
Payer: COMMERCIAL

## 2024-06-03 VITALS
TEMPERATURE: 96.9 F | DIASTOLIC BLOOD PRESSURE: 78 MMHG | SYSTOLIC BLOOD PRESSURE: 110 MMHG | WEIGHT: 175 LBS | BODY MASS INDEX: 28.12 KG/M2 | HEIGHT: 66 IN | HEART RATE: 72 BPM

## 2024-06-03 DIAGNOSIS — C34.90 ADENOCARCINOMA OF LUNG, UNSPECIFIED LATERALITY (MULTI): ICD-10-CM

## 2024-06-03 DIAGNOSIS — C79.31 METASTATIC CANCER TO BRAIN (MULTI): ICD-10-CM

## 2024-06-03 DIAGNOSIS — E11.65 TYPE 2 DIABETES MELLITUS WITH HYPERGLYCEMIA, WITHOUT LONG-TERM CURRENT USE OF INSULIN (MULTI): ICD-10-CM

## 2024-06-03 DIAGNOSIS — R59.0 MEDIASTINAL LYMPHADENOPATHY: ICD-10-CM

## 2024-06-03 DIAGNOSIS — N18.30 TYPE 2 DIABETES MELLITUS WITH STAGE 3 CHRONIC KIDNEY DISEASE, WITHOUT LONG-TERM CURRENT USE OF INSULIN, UNSPECIFIED WHETHER STAGE 3A OR 3B CKD (MULTI): ICD-10-CM

## 2024-06-03 DIAGNOSIS — Z00.00 ENCOUNTER FOR WELLNESS EXAMINATION IN ADULT: Primary | ICD-10-CM

## 2024-06-03 DIAGNOSIS — I73.9 PAD (PERIPHERAL ARTERY DISEASE) (CMS-HCC): ICD-10-CM

## 2024-06-03 DIAGNOSIS — E11.22 TYPE 2 DIABETES MELLITUS WITH STAGE 3 CHRONIC KIDNEY DISEASE, WITHOUT LONG-TERM CURRENT USE OF INSULIN, UNSPECIFIED WHETHER STAGE 3A OR 3B CKD (MULTI): ICD-10-CM

## 2024-06-03 DIAGNOSIS — F32.A DEPRESSION, UNSPECIFIED DEPRESSION TYPE: ICD-10-CM

## 2024-06-03 DIAGNOSIS — G45.8 SUBCLAVIAN STEAL SYNDROME: ICD-10-CM

## 2024-06-03 DIAGNOSIS — E78.1 HYPERTRIGLYCERIDEMIA: ICD-10-CM

## 2024-06-03 DIAGNOSIS — F41.9 ANXIETY: ICD-10-CM

## 2024-06-03 DIAGNOSIS — M25.551 RIGHT HIP PAIN: ICD-10-CM

## 2024-06-03 DIAGNOSIS — E27.40 ADRENAL INSUFFICIENCY (MULTI): ICD-10-CM

## 2024-06-03 DIAGNOSIS — C79.31 LUNG CANCER METASTATIC TO BRAIN (MULTI): ICD-10-CM

## 2024-06-03 DIAGNOSIS — E03.2 HYPOTHYROIDISM DUE TO MEDICATION: ICD-10-CM

## 2024-06-03 DIAGNOSIS — C34.91: ICD-10-CM

## 2024-06-03 DIAGNOSIS — C34.90 LUNG CANCER METASTATIC TO BRAIN (MULTI): ICD-10-CM

## 2024-06-03 DIAGNOSIS — G47.33 OSA (OBSTRUCTIVE SLEEP APNEA): ICD-10-CM

## 2024-06-03 PROBLEM — R04.2 HEMOPTYSIS: Status: RESOLVED | Noted: 2023-03-16 | Resolved: 2024-06-03

## 2024-06-03 PROBLEM — M25.531 RIGHT WRIST PAIN: Status: RESOLVED | Noted: 2023-03-16 | Resolved: 2024-06-03

## 2024-06-03 PROCEDURE — 3048F LDL-C <100 MG/DL: CPT | Performed by: STUDENT IN AN ORGANIZED HEALTH CARE EDUCATION/TRAINING PROGRAM

## 2024-06-03 PROCEDURE — 99396 PREV VISIT EST AGE 40-64: CPT | Performed by: STUDENT IN AN ORGANIZED HEALTH CARE EDUCATION/TRAINING PROGRAM

## 2024-06-03 PROCEDURE — 1036F TOBACCO NON-USER: CPT | Performed by: STUDENT IN AN ORGANIZED HEALTH CARE EDUCATION/TRAINING PROGRAM

## 2024-06-03 PROCEDURE — 3074F SYST BP LT 130 MM HG: CPT | Performed by: STUDENT IN AN ORGANIZED HEALTH CARE EDUCATION/TRAINING PROGRAM

## 2024-06-03 PROCEDURE — 3078F DIAST BP <80 MM HG: CPT | Performed by: STUDENT IN AN ORGANIZED HEALTH CARE EDUCATION/TRAINING PROGRAM

## 2024-06-03 PROCEDURE — 3061F NEG MICROALBUMINURIA REV: CPT | Performed by: STUDENT IN AN ORGANIZED HEALTH CARE EDUCATION/TRAINING PROGRAM

## 2024-06-03 PROCEDURE — 3044F HG A1C LEVEL LT 7.0%: CPT | Performed by: STUDENT IN AN ORGANIZED HEALTH CARE EDUCATION/TRAINING PROGRAM

## 2024-06-03 ASSESSMENT — ENCOUNTER SYMPTOMS
NERVOUS/ANXIOUS: 0
DYSURIA: 0
CHILLS: 0
FATIGUE: 0
FREQUENCY: 0
MYALGIAS: 0
NUMBNESS: 0
CONSTIPATION: 0
PALPITATIONS: 0
SHORTNESS OF BREATH: 0
VOMITING: 0
DIARRHEA: 0
RHINORRHEA: 0
ABDOMINAL PAIN: 0
NAUSEA: 0
FEVER: 0
COLOR CHANGE: 0
COUGH: 0
LIGHT-HEADEDNESS: 0
DIZZINESS: 0
DYSPHORIC MOOD: 0
ARTHRALGIAS: 0

## 2024-06-03 NOTE — PROGRESS NOTES
"Subjective   Patient ID: Betsy Sams is a 58 y.o. female who presents for Annual Exam.    HPI     No acute concerns or complaints today.  She did have a PET scan in late April.   She had a follow up bronchoscopy and follow up with her pulmonologist.    Dental: Will be calling to get a appointment.  Vision: Corrective vision with glasses. Will be calling to set up an appointment.    Diet: Well balanced. Diabetic diet.  Exercise: Walking. Push mower.  Caffeine: Tea in the morning.  Fluids: Well hydrated.  Supplements: B12, vitamin D, Omega 3    Tobacco: None. Quit in 2021.  Alcohol: None.  Recreational Drugs: None.    Last Colonoscopy: Previously ordered. Will be calling to reschedule. Had moved it.   Last Pap smear: Hysterectomy in 2008.  Mammogram: 05/24/2023. Order placed.  Low Dose Lung CT Screening: Followed by Heme/Onc for metastatic lung cancer.     Influenza: Recommended annually.  Tetanus: 2022  Pneumonia: Prevnar 20, 4/10/2023.  COVID: Recommended updated vaccine.  Shingles: Discussed.  RSV: Recommended at 60.    Review of Systems   Constitutional:  Negative for chills, fatigue and fever.   HENT:  Negative for congestion and rhinorrhea.    Eyes:  Negative for visual disturbance.   Respiratory:  Negative for cough and shortness of breath.    Cardiovascular:  Negative for chest pain and palpitations.   Gastrointestinal:  Negative for abdominal pain, constipation, diarrhea, nausea and vomiting.   Genitourinary:  Negative for dysuria and frequency.   Musculoskeletal:  Negative for arthralgias and myalgias.   Skin:  Negative for color change and rash.   Neurological:  Negative for dizziness, light-headedness and numbness.   Psychiatric/Behavioral:  Negative for dysphoric mood. The patient is not nervous/anxious.        Objective   /78   Pulse 72   Temp 36.1 °C (96.9 °F)   Ht 1.676 m (5' 6\")   Wt 79.4 kg (175 lb)   BMI 28.25 kg/m²   BSA Body surface area is 1.92 meters squared.      Physical " Exam  Vitals and nursing note reviewed.   Constitutional:       General: She is not in acute distress.     Appearance: Normal appearance. She is normal weight. She is not ill-appearing or toxic-appearing.   HENT:      Head: Normocephalic and atraumatic.      Right Ear: Tympanic membrane, ear canal and external ear normal.      Left Ear: Tympanic membrane, ear canal and external ear normal.      Nose: Nose normal.      Mouth/Throat:      Mouth: Mucous membranes are moist.   Eyes:      Extraocular Movements: Extraocular movements intact.      Conjunctiva/sclera: Conjunctivae normal.      Pupils: Pupils are equal, round, and reactive to light.   Cardiovascular:      Rate and Rhythm: Normal rate and regular rhythm.      Pulses: Normal pulses.      Heart sounds: Normal heart sounds.   Pulmonary:      Effort: Pulmonary effort is normal.      Breath sounds: Normal breath sounds.   Abdominal:      General: Abdomen is flat. Bowel sounds are normal.      Palpations: Abdomen is soft.   Musculoskeletal:         General: Normal range of motion.      Cervical back: Normal range of motion and neck supple.   Skin:     General: Skin is warm.   Neurological:      General: No focal deficit present.      Mental Status: She is alert and oriented to person, place, and time. Mental status is at baseline.      Cranial Nerves: No cranial nerve deficit.      Sensory: No sensory deficit.   Psychiatric:         Mood and Affect: Mood normal.         Behavior: Behavior normal.         Thought Content: Thought content normal.         Judgment: Judgment normal.       Hospital Outpatient Visit on 05/13/2024   Component Date Value Ref Range Status    POCT Glucose 05/13/2024 98  74 - 99 mg/dL Final    Case Report 05/13/2024    Final                    Value:Non-gynecologic Cytology                          Case: [id number]                                   Authorizing Provider:  Serafin Shelley MD     Collected:           05/13/2024 0853               Ordering Location:     [facility]    Received:            05/13/2024 0935              Pathologist:           Marty Nevarez MD                                                         Specimens:   A) - LYMPH NODE 11 L PULMONARY FINE NEEDLE ASPIRATION                                               B) - LYMPH NODE 7 PULMONARY FINE NEEDLE ASPIRATION                                                  C) - LYMPH NODE 4 R PULMONARY FINE NEEDLE ASPIRATION                                       Final Cytological Interpretation 05/13/2024    Final                    Value:This result contains rich text formatting which cannot be displayed here.      05/13/2024    Final                    Value:This result contains rich text formatting which cannot be displayed here.    Rapid Onsite Evaluation 05/13/2024    Final                    Value:This result contains rich text formatting which cannot be displayed here.    Clinical History 05/13/2024    Final                    Value:This result contains rich text formatting which cannot be displayed here.    Specimen Description 05/13/2024    Final                    Value:This result contains rich text formatting which cannot be displayed here.    Specimen Processing Detail 05/13/2024    Final                    Value:This result contains rich text formatting which cannot be displayed here.    POCT Glucose 05/13/2024 111 (H)  74 - 99 mg/dL Final   Lab on 05/10/2024   Component Date Value Ref Range Status    Thyroid Stimulating Hormone 05/10/2024 0.22 (L)  0.44 - 3.98 mIU/L Final    Thyroxine, Free 05/10/2024 1.02  0.61 - 1.12 ng/dL Final    Albumin, Urine Random 05/10/2024 7.1  Not established mg/L Final    Creatinine, Urine Random 05/10/2024 185.8  20.0 - 320.0 mg/dL Final    Albumin/Creatine Ratio 05/10/2024 3.8  <30.0 ug/mg Creat Final    Hemoglobin A1C 05/10/2024 6.2 (H)  see below % Final    Estimated Average Glucose 05/10/2024 131  Not Established mg/dL Final     Glucose 05/10/2024 100 (H)  74 - 99 mg/dL Final    Sodium 05/10/2024 140  136 - 145 mmol/L Final    Potassium 05/10/2024 4.5  3.5 - 5.3 mmol/L Final    Chloride 05/10/2024 104  98 - 107 mmol/L Final    Bicarbonate 05/10/2024 29  21 - 32 mmol/L Final    Anion Gap 05/10/2024 12  10 - 20 mmol/L Final    Urea Nitrogen 05/10/2024 23  6 - 23 mg/dL Final    Creatinine 05/10/2024 0.95  0.50 - 1.05 mg/dL Final    eGFR 05/10/2024 69  >60 mL/min/1.73m*2 Final    Calculations of estimated GFR are performed using the 2021 CKD-EPI Study Refit equation without the race variable for the IDMS-Traceable creatinine methods.  https://jasn.asnjournals.org/content/early/2021/09/22/ASN.7153749285    Calcium 05/10/2024 9.8  8.6 - 10.3 mg/dL Final    Phosphorus 05/10/2024 5.7 (H)  2.5 - 4.9 mg/dL Final    The performance characteristics of phosphorus testing in heparinized plasma have been validated by the individual  laboratory site where testing is performed. Testing on heparinized plasma is not approved by the FDA; however, such approval is not necessary.    Albumin 05/10/2024 4.0  3.4 - 5.0 g/dL Final    Cholesterol 05/10/2024 162  0 - 199 mg/dL Final          Age      Desirable   Borderline High   High     0-19 Y     0 - 169       170 - 199     >/= 200    20-24 Y     0 - 189       190 - 224     >/= 225         >24 Y     0 - 199       200 - 239     >/= 240   **All ranges are based on fasting samples. Specific   therapeutic targets will vary based on patient-specific   cardiac risk.    Pediatric guidelines reference:Pediatrics 2011, 128(S5).Adult guidelines reference: NCEP ATPIII Guidelines,CRIS 2001, 258:2486-97    Venipuncture immediately after or during the administration of Metamizole may lead to falsely low results. Testing should be performed immediately prior to Metamizole dosing.    HDL-Cholesterol 05/10/2024 50.2  mg/dL Final      Age       Very Low   Low     Normal    High    0-19 Y    < 35      < 40     40-45     ----  20-24  Y    ----     < 40      >45      ----        >24 Y      ----     < 40     40-60      >60      Cholesterol/HDL Ratio 05/10/2024 3.2   Final      Ref Values  Desirable  < 3.4  High Risk  > 5.0    LDL Calculated 05/10/2024 65  <=99 mg/dL Final                                Near   Borderline      AGE      Desirable  Optimal    High     High     Very High     0-19 Y     0 - 109     ---    110-129   >/= 130     ----    20-24 Y     0 - 119     ---    120-159   >/= 160     ----      >24 Y     0 -  99   100-129  130-159   160-189     >/=190      VLDL 05/10/2024 46 (H)  0 - 40 mg/dL Final    Triglycerides 05/10/2024 232 (H)  0 - 149 mg/dL Final       Age         Desirable   Borderline High   High     Very High   0 D-90 D    19 - 174         ----         ----        ----  91 D- 9 Y     0 -  74        75 -  99     >/= 100      ----    10-19 Y     0 -  89        90 - 129     >/= 130      ----    20-24 Y     0 - 114       115 - 149     >/= 150      ----         >24 Y     0 - 149       150 - 199    200- 499    >/= 500    Venipuncture immediately after or during the administration of Metamizole may lead to falsely low results. Testing should be performed immediately prior to Metamizole dosing.    Non HDL Cholesterol 05/10/2024 112  0 - 149 mg/dL Final          Age       Desirable   Borderline High   High     Very High     0-19 Y     0 - 119       120 - 144     >/= 145    >/= 160    20-24 Y     0 - 149       150 - 189     >/= 190      ----         >24 Y    30 mg/dL above LDL Cholesterol goal      Vitamin B12 05/10/2024 588  211 - 911 pg/mL Final    Vitamin D, 25-Hydroxy, Total 05/10/2024 37  30 - 100 ng/mL Final    C-Peptide 05/10/2024 6.5 (H)  0.7 - 3.9 ng/mL Final    WBC 05/10/2024 7.9  4.4 - 11.3 x10*3/uL Final    nRBC 05/10/2024 0.0  0.0 - 0.0 /100 WBCs Final    RBC 05/10/2024 4.68  4.00 - 5.20 x10*6/uL Final    Hemoglobin 05/10/2024 13.9  12.0 - 16.0 g/dL Final    Hematocrit 05/10/2024 43.4  36.0 - 46.0 % Final    MCV  05/10/2024 93  80 - 100 fL Final    MCH 05/10/2024 29.7  26.0 - 34.0 pg Final    MCHC 05/10/2024 32.0  32.0 - 36.0 g/dL Final    RDW 05/10/2024 12.5  11.5 - 14.5 % Final    Platelets 05/10/2024 269  150 - 450 x10*3/uL Final   Lab on 04/09/2024   Component Date Value Ref Range Status    WBC 04/09/2024 8.6  4.4 - 11.3 x10*3/uL Final    nRBC 04/09/2024 0.0  0.0 - 0.0 /100 WBCs Final    RBC 04/09/2024 4.50  4.00 - 5.20 x10*6/uL Final    Hemoglobin 04/09/2024 13.2  12.0 - 16.0 g/dL Final    Hematocrit 04/09/2024 40.5  36.0 - 46.0 % Final    MCV 04/09/2024 90  80 - 100 fL Final    MCH 04/09/2024 29.3  26.0 - 34.0 pg Final    MCHC 04/09/2024 32.6  32.0 - 36.0 g/dL Final    RDW 04/09/2024 11.9  11.5 - 14.5 % Final    Platelets 04/09/2024 272  150 - 450 x10*3/uL Final    Neutrophils % 04/09/2024 66.9  40.0 - 80.0 % Final    Immature Granulocytes %, Automated 04/09/2024 0.1  0.0 - 0.9 % Final    Immature Granulocyte Count (IG) includes promyelocytes, myelocytes and metamyelocytes but does not include bands. Percent differential counts (%) should be interpreted in the context of the absolute cell counts (cells/UL).    Lymphocytes % 04/09/2024 26.7  13.0 - 44.0 % Final    Monocytes % 04/09/2024 5.3  2.0 - 10.0 % Final    Eosinophils % 04/09/2024 0.8  0.0 - 6.0 % Final    Basophils % 04/09/2024 0.2  0.0 - 2.0 % Final    Neutrophils Absolute 04/09/2024 5.77  1.20 - 7.70 x10*3/uL Final    Percent differential counts (%) should be interpreted in the context of the absolute cell counts (cells/uL).    Immature Granulocytes Absolute, Au* 04/09/2024 0.01  0.00 - 0.70 x10*3/uL Final    Lymphocytes Absolute 04/09/2024 2.31  1.20 - 4.80 x10*3/uL Final    Monocytes Absolute 04/09/2024 0.46  0.10 - 1.00 x10*3/uL Final    Eosinophils Absolute 04/09/2024 0.07  0.00 - 0.70 x10*3/uL Final    Basophils Absolute 04/09/2024 0.02  0.00 - 0.10 x10*3/uL Final    Glucose 04/09/2024 118 (H)  74 - 99 mg/dL Final    Sodium 04/09/2024 137  136 - 145  mmol/L Final    Potassium 04/09/2024 4.3  3.5 - 5.3 mmol/L Final    Chloride 04/09/2024 106  98 - 107 mmol/L Final    Bicarbonate 04/09/2024 24  21 - 32 mmol/L Final    Anion Gap 04/09/2024 11  10 - 20 mmol/L Final    Urea Nitrogen 04/09/2024 21  6 - 23 mg/dL Final    Creatinine 04/09/2024 1.03  0.50 - 1.05 mg/dL Final    eGFR 04/09/2024 63  >60 mL/min/1.73m*2 Final    Calculations of estimated GFR are performed using the 2021 CKD-EPI Study Refit equation without the race variable for the IDMS-Traceable creatinine methods.  https://jasn.asnjournals.org/content/early/2021/09/22/ASN.0372778617    Calcium 04/09/2024 9.0  8.6 - 10.3 mg/dL Final    Albumin 04/09/2024 4.0  3.4 - 5.0 g/dL Final    Alkaline Phosphatase 04/09/2024 86  33 - 110 U/L Final    Total Protein 04/09/2024 7.0  6.4 - 8.2 g/dL Final    AST 04/09/2024 16  9 - 39 U/L Final    Bilirubin, Total 04/09/2024 0.5  0.0 - 1.2 mg/dL Final    ALT 04/09/2024 21  7 - 45 U/L Final    Patients treated with Sulfasalazine may generate falsely decreased results for ALT.    Magnesium 04/09/2024 1.88  1.60 - 2.40 mg/dL Final    Adrenocorticotropic Hormone (ACTH) 04/09/2024 <1.5 (L)  7.2 - 63.3 pg/mL Final    INTERPRETIVE INFORMATION: Adrenocorticotropic Hormone    Reference interval based on samples collected between 7 a.m. and   10 a.m.  No reference intervals established for p.m. collections.    Pediatric reference values are the same as adults (Acta Paediatr   Scand 1981;70:341-345).  This assay measures intact ACTH 1-39;   some types of synthetic ACTH and ACTH fragments are not detected   by this assay.  Performed By: [facility]  [address] [place] [id number]  : Monster Rider MD, PhD  CLIA Number: [id number]    Thyroid Stimulating Hormone 04/09/2024 0.07 (L)  0.44 - 3.98 mIU/L Final    Thyroxine, Free 04/09/2024 1.23 (H)  0.61 - 1.12 ng/dL Final    Cortisol 04/09/2024 28.6 (H)  2.5 - 20.0 ug/dL Final   Lab on 01/17/2024    Component Date Value Ref Range Status    Cholesterol 01/17/2024 166  0 - 199 mg/dL Final          Age      Desirable   Borderline High   High     0-19 Y     0 - 169       170 - 199     >/= 200    20-24 Y     0 - 189       190 - 224     >/= 225         >24 Y     0 - 199       200 - 239     >/= 240   **All ranges are based on fasting samples. Specific   therapeutic targets will vary based on patient-specific   cardiac risk.    Pediatric guidelines reference:Pediatrics 2011, 128(S5).Adult guidelines reference: NCEP ATPIII Guidelines,CRIS 2001, 258:2486-97    Venipuncture immediately after or during the administration of Metamizole may lead to falsely low results. Testing should be performed immediately prior to Metamizole dosing.    HDL-Cholesterol 01/17/2024 40.5  mg/dL Final      Age       Very Low   Low     Normal    High    0-19 Y    < 35      < 40     40-45     ----  20-24 Y    ----     < 40      >45      ----        >24 Y      ----     < 40     40-60      >60      Cholesterol/HDL Ratio 01/17/2024 4.1   Final      Ref Values  Desirable  < 3.4  High Risk  > 5.0    LDL Calculated 01/17/2024 74  <=99 mg/dL Final                                Near   Borderline      AGE      Desirable  Optimal    High     High     Very High     0-19 Y     0 - 109     ---    110-129   >/= 130     ----    20-24 Y     0 - 119     ---    120-159   >/= 160     ----      >24 Y     0 -  99   100-129  130-159   160-189     >/=190      VLDL 01/17/2024 51 (H)  0 - 40 mg/dL Final    Triglycerides 01/17/2024 257 (H)  0 - 149 mg/dL Final       Age         Desirable   Borderline High   High     Very High   0 D-90 D    19 - 174         ----         ----        ----  91 D- 9 Y     0 -  74        75 -  99     >/= 100      ----    10-19 Y     0 -  89        90 - 129     >/= 130      ----    20-24 Y     0 - 114       115 - 149     >/= 150      ----         >24 Y     0 - 149       150 - 199    200- 499    >/= 500    Venipuncture immediately after or  during the administration of Metamizole may lead to falsely low results. Testing should be performed immediately prior to Metamizole dosing.    Non HDL Cholesterol 01/17/2024 126  0 - 149 mg/dL Final          Age       Desirable   Borderline High   High     Very High     0-19 Y     0 - 119       120 - 144     >/= 145    >/= 160    20-24 Y     0 - 149       150 - 189     >/= 190      ----         >24 Y    30 mg/dL above LDL Cholesterol goal      Hemoglobin A1C 01/17/2024 7.2 (H)  see below % Final    Estimated Average Glucose 01/17/2024 160  Not Established mg/dL Final    WBC 01/17/2024 9.4  4.4 - 11.3 x10*3/uL Final    nRBC 01/17/2024 0.0  0.0 - 0.0 /100 WBCs Final    RBC 01/17/2024 4.81  4.00 - 5.20 x10*6/uL Final    Hemoglobin 01/17/2024 14.6  12.0 - 16.0 g/dL Final    Hematocrit 01/17/2024 44.8  36.0 - 46.0 % Final    MCV 01/17/2024 93  80 - 100 fL Final    MCH 01/17/2024 30.4  26.0 - 34.0 pg Final    MCHC 01/17/2024 32.6  32.0 - 36.0 g/dL Final    RDW 01/17/2024 12.7  11.5 - 14.5 % Final    Platelets 01/17/2024 305  150 - 450 x10*3/uL Final    Neutrophils % 01/17/2024 47.8  40.0 - 80.0 % Final    Immature Granulocytes %, Automated 01/17/2024 0.6  0.0 - 0.9 % Final    Immature Granulocyte Count (IG) includes promyelocytes, myelocytes and metamyelocytes but does not include bands. Percent differential counts (%) should be interpreted in the context of the absolute cell counts (cells/UL).    Lymphocytes % 01/17/2024 43.2  13.0 - 44.0 % Final    Monocytes % 01/17/2024 6.0  2.0 - 10.0 % Final    Eosinophils % 01/17/2024 2.0  0.0 - 6.0 % Final    Basophils % 01/17/2024 0.4  0.0 - 2.0 % Final    Neutrophils Absolute 01/17/2024 4.49  1.20 - 7.70 x10*3/uL Final    Percent differential counts (%) should be interpreted in the context of the absolute cell counts (cells/uL).    Immature Granulocytes Absolute, Au* 01/17/2024 0.06  0.00 - 0.70 x10*3/uL Final    Lymphocytes Absolute 01/17/2024 4.06  1.20 - 4.80 x10*3/uL Final     Monocytes Absolute 01/17/2024 0.56  0.10 - 1.00 x10*3/uL Final    Eosinophils Absolute 01/17/2024 0.19  0.00 - 0.70 x10*3/uL Final    Basophils Absolute 01/17/2024 0.04  0.00 - 0.10 x10*3/uL Final    Glucose 01/17/2024 106 (H)  74 - 99 mg/dL Final    Sodium 01/17/2024 141  136 - 145 mmol/L Final    Potassium 01/17/2024 4.1  3.5 - 5.3 mmol/L Final    Chloride 01/17/2024 105  98 - 107 mmol/L Final    Bicarbonate 01/17/2024 27  21 - 32 mmol/L Final    Anion Gap 01/17/2024 13  10 - 20 mmol/L Final    Urea Nitrogen 01/17/2024 15  6 - 23 mg/dL Final    Creatinine 01/17/2024 1.06 (H)  0.50 - 1.05 mg/dL Final    eGFR 01/17/2024 61  >60 mL/min/1.73m*2 Final    Calculations of estimated GFR are performed using the 2021 CKD-EPI Study Refit equation without the race variable for the IDMS-Traceable creatinine methods.  https://jasn.asnjournals.org/content/early/2021/09/22/ASN.0544090577    Calcium 01/17/2024 9.3  8.6 - 10.3 mg/dL Final    Albumin 01/17/2024 4.0  3.4 - 5.0 g/dL Final    Alkaline Phosphatase 01/17/2024 98  33 - 110 U/L Final    Total Protein 01/17/2024 7.0  6.4 - 8.2 g/dL Final    AST 01/17/2024 20  9 - 39 U/L Final    Bilirubin, Total 01/17/2024 0.7  0.0 - 1.2 mg/dL Final    ALT 01/17/2024 40  7 - 45 U/L Final    Patients treated with Sulfasalazine may generate falsely decreased results for ALT.    Thyroid Stimulating Hormone 01/17/2024 0.82  0.44 - 3.98 mIU/L Final    Cortisol 01/17/2024 0.9 (L)  2.5 - 20.0 ug/dL Final    Adrenocorticotropic Hormone (ACTH) 01/17/2024 <1.5 (L)  7.2 - 63.3 pg/mL Final    INTERPRETIVE INFORMATION: Adrenocorticotropic Hormone    Reference interval based on samples collected between 7 a.m. and   10 a.m.  No reference intervals established for p.m. collections.    Pediatric reference values are the same as adults (Acta Paediatr   Scand 1981;70:341-345).  This assay measures intact ACTH 1-39;   some types of synthetic ACTH and ACTH fragments are not detected   by this  assay.  Performed By: [facility]  [address] [place] [id number]  : Monster Rider MD, PhD  CLIA Number: [id number]    Thyroxine, Free 01/17/2024 1.02  0.61 - 1.12 ng/dL Final   Lab on 10/20/2023   Component Date Value Ref Range Status    WBC 10/20/2023 7.3  4.4 - 11.3 x10*3/uL Final    nRBC 10/20/2023 0.0  0.0 - 0.0 /100 WBCs Final    RBC 10/20/2023 4.40  4.00 - 5.20 x10*6/uL Final    Hemoglobin 10/20/2023 13.4  12.0 - 16.0 g/dL Final    Hematocrit 10/20/2023 41.5  36.0 - 46.0 % Final    MCV 10/20/2023 94  80 - 100 fL Final    MCH 10/20/2023 30.5  26.0 - 34.0 pg Final    MCHC 10/20/2023 32.3  32.0 - 36.0 g/dL Final    RDW 10/20/2023 12.7  11.5 - 14.5 % Final    Platelets 10/20/2023 240  150 - 450 x10*3/uL Final    MPV 10/20/2023 10.3  7.5 - 11.5 fL Final    Neutrophils % 10/20/2023 58.3  40.0 - 80.0 % Final    Immature Granulocytes %, Automated 10/20/2023 0.3  0.0 - 0.9 % Final    Immature Granulocyte Count (IG) includes promyelocytes, myelocytes and metamyelocytes but does not include bands. Percent differential counts (%) should be interpreted in the context of the absolute cell counts (cells/UL).    Lymphocytes % 10/20/2023 34.6  13.0 - 44.0 % Final    Monocytes % 10/20/2023 5.9  2.0 - 10.0 % Final    Eosinophils % 10/20/2023 0.5  0.0 - 6.0 % Final    Basophils % 10/20/2023 0.4  0.0 - 2.0 % Final    Neutrophils Absolute 10/20/2023 4.27  1.20 - 7.70 x10*3/uL Final    Percent differential counts (%) should be interpreted in the context of the absolute cell counts (cells/uL).    Immature Granulocytes Absolute, Au* 10/20/2023 0.02  0.00 - 0.70 x10*3/uL Final    Lymphocytes Absolute 10/20/2023 2.53  1.20 - 4.80 x10*3/uL Final    Monocytes Absolute 10/20/2023 0.43  0.10 - 1.00 x10*3/uL Final    Eosinophils Absolute 10/20/2023 0.04  0.00 - 0.70 x10*3/uL Final    Basophils Absolute 10/20/2023 0.03  0.00 - 0.10 x10*3/uL Final    Glucose 10/20/2023 117 (H)  74 - 99 mg/dL Final     Sodium 10/20/2023 144  136 - 145 mmol/L Final    Potassium 10/20/2023 4.4  3.5 - 5.3 mmol/L Final    Chloride 10/20/2023 108 (H)  98 - 107 mmol/L Final    Bicarbonate 10/20/2023 27  21 - 32 mmol/L Final    Anion Gap 10/20/2023 13  10 - 20 mmol/L Final    Urea Nitrogen 10/20/2023 13  6 - 23 mg/dL Final    Creatinine 10/20/2023 0.93  0.50 - 1.05 mg/dL Final    eGFR 10/20/2023 71  >60 mL/min/1.73m*2 Final    Calculations of estimated GFR are performed using the 2021 CKD-EPI Study Refit equation without the race variable for the IDMS-Traceable creatinine methods.  https://jasn.asnjournals.org/content/early/2021/09/22/ASN.5556534972    Calcium 10/20/2023 9.5  8.6 - 10.3 mg/dL Final    Albumin 10/20/2023 3.8  3.4 - 5.0 g/dL Final    Alkaline Phosphatase 10/20/2023 77  33 - 110 U/L Final    Total Protein 10/20/2023 6.6  6.4 - 8.2 g/dL Final    AST 10/20/2023 19  9 - 39 U/L Final    Bilirubin, Total 10/20/2023 0.5  0.0 - 1.2 mg/dL Final    ALT 10/20/2023 35  7 - 45 U/L Final    Patients treated with Sulfasalazine may generate falsely decreased results for ALT.    Thyroid Stimulating Hormone 10/20/2023 0.25 (L)  0.44 - 3.98 mIU/L Final    Thyroxine, Free 10/20/2023 1.33 (H)  0.61 - 1.12 ng/dL Final    Cortisol 10/20/2023 33.0 (H)  2.5 - 20.0 ug/dL Final    Adrenocorticotropic Hormone (ACTH) 10/20/2023 1.9 (L)  7.2 - 63.3 pg/mL Final    INTERPRETIVE INFORMATION: Adrenocorticotropic Hormone    Reference interval based on samples collected between 7 a.m. and   10 a.m.  No reference intervals established for p.m. collections.    Pediatric reference values are the same as adults (Acta Paediatr   Scand 1981;70:341-345).  This assay measures intact ACTH 1-39;   some types of synthetic ACTH and ACTH fragments are not detected   by this assay.  Performed By: [facility]  [address] [place] [id number]  : Monster Rider MD, PhD  CLIA Number: [id number]    Creatinine, Urine Random 10/20/2023  184.7  20.0 - 320.0 mg/dL Final    Magnesium 10/20/2023 1.86  1.60 - 2.40 mg/dL Final    Thyroxine, Free by [facility]* 10/20/2023 2.5 (H)  1.1 - 2.4 ng/dL Final      FREE T4 BY [facility]-TMS:   PREGNANCY REFERENCE INTERVALS        1ST TRIMESTER ...... 0.7 - 2.0 ng/dL      2ND TRIMESTER ...... 0.7 - 2.1 ng/dL       3RD TRIMESTER ...... 0.5 - 1.6 ng/dL  INTERPRETIVE INFORMATION: FT4 ED-TMS    This test was developed and its performance characteristics   determined by [facility]. It has not been cleared or   approved by the US Food and Drug Administration. This test was   performed in a CLIA certified laboratory and is intended for   clinical purposes.  Performed By: [facility]  [address] [place] [id number]  : Monster Rider MD, PhD  CLIA Number: [id number]   Orders Only on 07/24/2023   Component Date Value Ref Range Status    Magnesium 07/24/2023 2.06  1.60 - 2.40 mg/dL Final    Bilirubin, Direct 07/24/2023 0.1  0.0 - 0.3 mg/dL Final    WBC 07/24/2023 10.9  4.4 - 11.3 x10E9/L Final    RBC 07/24/2023 4.42  4.00 - 5.20 x10E12/L Final    Hemoglobin 07/24/2023 13.4  12.0 - 16.0 g/dL Final    Hematocrit 07/24/2023 40.8  36.0 - 46.0 % Final    MCV 07/24/2023 92  80 - 100 fL Final    MCHC 07/24/2023 32.8  32.0 - 36.0 g/dL Final    Platelets 07/24/2023 253  150 - 450 x10E9/L Final    RDW 07/24/2023 13.2  11.5 - 14.5 % Final    Neutrophils % 07/24/2023 54.6  40.0 - 80.0 % Final    Immature Granulocytes %, Automated 07/24/2023 0.9  0.0 - 0.9 % Final    Comment:  Immature Granulocyte Count (IG) includes promyelocytes,    myelocytes and metamyelocytes but does not include bands.   Percent differential counts (%) should be interpreted in the   context of the absolute cell counts (cells/L).      Lymphocytes % 07/24/2023 37.3  13.0 - 44.0 % Final    Monocytes % 07/24/2023 6.5  2.0 - 10.0 % Final    Eosinophils % 07/24/2023 0.2  0.0 - 6.0 % Final    Basophils % 07/24/2023  0.5  0.0 - 2.0 % Final    Neutrophils Absolute 07/24/2023 5.93  1.20 - 7.70 x10E9/L Final    Lymphocytes Absolute 07/24/2023 4.05  1.20 - 4.80 x10E9/L Final    Monocytes Absolute 07/24/2023 0.71  0.10 - 1.00 x10E9/L Final    Eosinophils Absolute 07/24/2023 0.02  0.00 - 0.70 x10E9/L Final    Basophils Absolute 07/24/2023 0.05  0.00 - 0.10 x10E9/L Final    Hemoglobin A1C 07/24/2023 6.9 (A)  % Final    Comment:      Diagnosis of Diabetes-Adults   Non-Diabetic: < or = 5.6%   Increased risk for developing diabetes: 5.7-6.4%   Diagnostic of diabetes: > or = 6.5%  .       Monitoring of Diabetes                Age (y)     Therapeutic Goal (%)   Adults:          >18           <7.0   Pediatrics:    13-18           <7.5                   7-12           <8.0                   0- 6            7.5-8.5   American Diabetes Association. Diabetes Care 33(S1), Jan 2010.      Estimated Average Glucose 07/24/2023 151  MG/DL Final    T3, Total 07/24/2023 85  60 - 200 ng/dL Final    Free T4 07/24/2023 0.91  0.61 - 1.12 ng/dL Final    Comment:  Thyroxine Free testing is performed using different testing    methodology at [facility] ([facility]) than at other    [facility]. Direct result comparisons should    only be made within the same method.  .   Biotin can cause falsely elevated free T4 results. Patients   taking a Biotin dose of up to 10 mg/day should refrain from   taking Biotin for 24 hours before sample collection. Patient   taking a Biotin dose of >10 mg/day should consult with their   physician or the laboratory before the blood draw.      C PEPTIDE (NG/ML) IN SER/PLAS 07/24/2023 5.4 (H)  0.7 - 3.9 ng/mL Final    TSH 07/24/2023 0.47  0.44 - 3.98 mIU/L Final    Comment:  TSH testing is performed using different testing    methodology at [facility] than at other    [facility]. Direct result comparisons should    only be made within the same method.      ALBUMIN (MG/L) IN URINE 07/24/2023 42.4  Not  Established mg/L Final    Albumin/Creatine Ratio 07/24/2023 40.4 (H)  0.0 - 30.0 ug/mg crt Final    Creatinine, Urine 07/24/2023 105.0  20.0 - 320.0 mg/dL Final    Adrenocorticotropic Hormone (ACTH) 07/24/2023 1.7 (L)  7.2 - 63.3 pg/mL Final    Comment: INTERPRETIVE INFORMATION: Adrenocorticotropic Hormone  Reference interval based on samples collected between 7 a.m. and   10 a.m.  No reference intervals established for p.m. collections.    Pediatric reference values are the same as adults (Acta Paediatr   Scand 1981;70:341-345).  This assay measures intact ACTH 1-39;   some types of synthetic ACTH and ACTH fragments are not detected   by this assay.  Performed By: [facility]  [address] [place] [id number]  : Monster Rider MD, PhD      Glucose 07/24/2023 96  74 - 99 mg/dL Final    Sodium 07/24/2023 137  136 - 145 mmol/L Final    Potassium 07/24/2023 4.3  3.5 - 5.3 mmol/L Final    Chloride 07/24/2023 103  98 - 107 mmol/L Final    Bicarbonate 07/24/2023 27  21 - 32 mmol/L Final    Anion Gap 07/24/2023 11  10 - 20 mmol/L Final    Urea Nitrogen 07/24/2023 27 (H)  6 - 23 mg/dL Final    Creatinine 07/24/2023 0.87  0.50 - 1.05 mg/dL Final    GFR Female 07/24/2023 77  >90 mL/min/1.73m2 Final    Comment:  CALCULATIONS OF ESTIMATED GFR ARE PERFORMED   USING THE 2021 CKD-EPI STUDY REFIT EQUATION   WITHOUT THE RACE VARIABLE FOR THE IDMS-TRACEABLE   CREATININE METHODS.    https://jasn.asnjournals.org/content/early/2021/09/22/ASN.6040758834      Calcium 07/24/2023 9.0  8.6 - 10.3 mg/dL Final    Albumin 07/24/2023 4.0  3.4 - 5.0 g/dL Final    Alkaline Phosphatase 07/24/2023 100  33 - 110 U/L Final    Total Protein 07/24/2023 7.1  6.4 - 8.2 g/dL Final    AST 07/24/2023 14  9 - 39 U/L Final    Total Bilirubin 07/24/2023 0.6  0.0 - 1.2 mg/dL Final    ALT (SGPT) 07/24/2023 24  7 - 45 U/L Final    Comment:  Patients treated with Sulfasalazine may generate    falsely decreased results for  ALT.      Cholesterol 07/24/2023 184  0 - 199 mg/dL Final    Comment: .      AGE      DESIRABLE   BORDERLINE HIGH   HIGH     0-19 Y     0 - 169       170 - 199     >/= 200    20-24 Y     0 - 189       190 - 224     >/= 225         >24 Y     0 - 199       200 - 239     >/= 240   **All ranges are based on fasting samples. Specific   therapeutic targets will vary based on patient-specific   cardiac risk.  .   Pediatric guidelines reference:Pediatrics 2011, 128(S5).   Adult guidelines reference: NCEP ATPIII Guidelines,     CRIS 2001, 258:2486-97  .   Venipuncture immediately after or during the    administration of Metamizole may lead to falsely   low results. Testing should be performed immediately   prior to Metamizole dosing.      HDL 07/24/2023 62.4  mg/dL Final    Comment: .      AGE      VERY LOW   LOW     NORMAL    HIGH       0-19 Y       < 35   < 40     40-45     ----    20-24 Y       ----   < 40       >45     ----      >24 Y       ----   < 40     40-60      >60  .      Cholesterol/HDL Ratio 07/24/2023 2.9   Final    Comment: REF VALUES  DESIRABLE  < 3.4  HIGH RISK  > 5.0      LDL 07/24/2023 85  0 - 99 mg/dL Final    Comment: .                           NEAR      BORD      AGE      DESIRABLE  OPTIMAL    HIGH     HIGH     VERY HIGH     0-19 Y     0 - 109     ---    110-129   >/= 130     ----    20-24 Y     0 - 119     ---    120-159   >/= 160     ----      >24 Y     0 -  99   100-129  130-159   160-189     >/=190  .      VLDL 07/24/2023 37  0 - 40 mg/dL Final    Triglycerides 07/24/2023 185 (H)  0 - 149 mg/dL Final    Comment: .      AGE      DESIRABLE   BORDERLINE HIGH   HIGH     VERY HIGH   0 D-90 D    19 - 174         ----         ----        ----  91 D- 9 Y     0 -  74        75 -  99     >/= 100      ----    10-19 Y     0 -  89        90 - 129     >/= 130      ----    20-24 Y     0 - 114       115 - 149     >/= 150      ----         >24 Y     0 - 149       150 - 199    200- 499    >/= 500  .   Venipuncture  "immediately after or during the    administration of Metamizole may lead to falsely   low results. Testing should be performed immediately   prior to Metamizole dosing.      Cortisol- AM 07/24/2023 <0.5 (A)  5.0 - 20.0 ug/dL Final     Current Outpatient Medications on File Prior to Visit   Medication Sig Dispense Refill    albuterol (Ventolin HFA) 90 mcg/actuation inhaler Inhale 2 puffs every 4 hours if needed for wheezing or shortness of breath. 18 g 1    alcohol swabs pads, medicated use as directed once daily      aspirin 81 mg chewable tablet Chew 1 tablet (81 mg) once daily.      atorvastatin (Lipitor) 20 mg tablet Take 1 tablet (20 mg) by mouth once daily. 90 tablet 1    BD Luer-Haily Syringe 3 mL 25 gauge x 1\" syringe use as directed      clopidogrel (Plavix) 75 mg tablet Take 1 tablet (75 mg) by mouth once daily. 90 tablet 1    cyanocobalamin (Vitamin B-12) 1,000 mcg tablet Take 1 tablet (1,000 mcg) by mouth once daily. 90 tablet 1    dexAMETHasone (Decadron) 4 mg/mL injection INJECT 4MG IN CASE OF EMERGENCY      dulaglutide (Trulicity) 0.75 mg/0.5 mL pen injector Inject 0.75 mg under the skin 1 (one) time per week. 4 each 11    ergocalciferol (Vitamin D-2) 1.25 MG (00663 UT) capsule Take 1 capsule (50,000 Units) by mouth 1 (one) time per week. 12 capsule 0    hydrocortisone (Cortef) 10 mg tablet 20mg in the morning and 10mg in the afternoon 300 tablet 3    ipratropium (Atrovent) 21 mcg (0.03 %) nasal spray Administer 2 sprays into each nostril. 2-3 times daily      levothyroxine (Synthroid, Levoxyl) 150 mcg tablet Take 1 tablet (150 mcg) by mouth every other day. (ALTERNATE WITH 175 MCG) 40 tablet 3    levothyroxine (Synthroid, Levoxyl) 175 mcg tablet Take 1 tablet (175 mcg) by mouth every other day. (ALTERNATE WITH 150 MG)      metFORMIN (Glucophage) 500 mg tablet Take 1 tablet (500 mg) by mouth once daily. Take with food. 90 tablet 1    Microlet Lancet misc use 1 LANCET to TEST BLOOD SUGAR once daily as " directed      ondansetron ODT (Zofran-ODT) 4 mg disintegrating tablet Take 1 tablet (4 mg) by mouth every 8 hours if needed for nausea or vomiting. 30 tablet 0    OneTouch Ultra Test strip use 1 TEST STRIP to TEST BLOOD SUGAR once daily      pantoprazole (ProtoNix) 40 mg EC tablet Take 1 tablet (40 mg) by mouth once daily in the morning. Take before meals. 30 MINUTES PRIOR TO BREAKFAST 90 tablet 1    escitalopram (Lexapro) 5 mg tablet Take 1 tablet (5 mg) by mouth once daily. 90 tablet 1    famotidine (Pepcid) 20 mg tablet Take 1 tablet (20 mg) by mouth 2 times a day. 60 tablet 5    omega-3 acid ethyl esters (Lovaza) 1 gram capsule Take 2 capsules (2 g) by mouth 2 times a day with meals. 120 capsule 3    traZODone (Desyrel) 50 mg tablet Take 1 tablet (50 mg) by mouth as needed at bedtime for sleep. 90 tablet 1     No current facility-administered medications on file prior to visit.     No images are attached to the encounter.        Assessment/Plan   Problem List Items Addressed This Visit             ICD-10-CM    Adenocarcinoma, lung (Multi) C34.90     Following with hematology and oncology with Dr. Michelle Owen with .         Adrenal insufficiency (Multi) E27.40     Following with Endocrinology with Dr. Gillespie with           Anxiety F41.9     Chronic. Stable.  Following with behavioral health management.         Depression F32.A     Chronic. Stable.  Following with behavioral health management.         Diabetes (Multi) E11.9     Chronic. Stable. Following with Endocrinology with          Hypertriglyceridemia E78.1     Chronic.  Stable. Following with endocrinology.         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 Gillespie with           Mediastinal lymphadenopathy R59.0     Following with hematology and oncology with Dr. Michelle Owen with .          Metastatic cancer to brain (Multi) C79.31     Following with hematology and oncology with Dr. Michelle Owen with  .         Lung cancer metastatic to brain (Multi) C34.90, C79.31     Following with hematology and oncology with Dr. Michelle Owen with .         Stage 4 lung cancer (Multi) C34.90     Following with hematology and oncology with Dr. Michelle Owen with .         TOAN (obstructive sleep apnea) G47.33     Doesn't use CPAP          PAD (peripheral artery disease) (CMS-HCC) I73.9     Follows with vascular surgery Dr. Rodriguez. Chronic and stable at this time. Continue current rx med.          Subclavian steal syndrome G45.8     Follows with vascular surgery Dr. Rodriguez. Chronic and stable at this time. Continue current rx med.          Encounter for wellness examination in adult - Primary Z00.00    Type 2 diabetes mellitus with stage 3 chronic kidney disease, without long-term current use of insulin, unspecified whether stage 3a or 3b CKD (Multi) E11.22, N18.30     Other Visit Diagnoses         Codes    Right hip pain     M25.551    Relevant Orders    XR hip right with pelvis when performed 2 or 3 views          History and physical examination as above.  Patient presenting for annual wellness visit.  No acute concerns or complaints today.  She is continuing to follow along with her endocrinologist, pulmonologist, vascular surgeon and oncology as well.  Discussed multiple aspects of health including healthy diet and exercise, regular dental and vision care, hydration, caffeine use, supplementation, substance use, screening tests and any stations.  Recommendations given as noted above.  Patient with increasing right hip pain.  She does suspect it could be more arthritic.  X-rays of the area ordered we will contact patient with the results.  Patient will continue with regular follow-up every 4 to 6 months.  She will call to set up her next appointment.  She will also call with any other needed medication refills.  She will call sooner with other acute concerns or complaints.

## 2024-06-11 ENCOUNTER — OFFICE VISIT (OUTPATIENT)
Dept: ENDOCRINOLOGY | Facility: CLINIC | Age: 59
End: 2024-06-11
Payer: COMMERCIAL

## 2024-06-11 VITALS
HEART RATE: 88 BPM | SYSTOLIC BLOOD PRESSURE: 95 MMHG | TEMPERATURE: 97.8 F | BODY MASS INDEX: 28.15 KG/M2 | WEIGHT: 174.4 LBS | DIASTOLIC BLOOD PRESSURE: 66 MMHG

## 2024-06-11 DIAGNOSIS — E78.2 MIXED HYPERLIPIDEMIA: Primary | ICD-10-CM

## 2024-06-11 DIAGNOSIS — E27.40 ADRENAL INSUFFICIENCY (MULTI): ICD-10-CM

## 2024-06-11 DIAGNOSIS — E11.65 TYPE 2 DIABETES MELLITUS WITH HYPERGLYCEMIA, WITHOUT LONG-TERM CURRENT USE OF INSULIN (MULTI): ICD-10-CM

## 2024-06-11 DIAGNOSIS — E03.9 HYPOTHYROIDISM, UNSPECIFIED TYPE: ICD-10-CM

## 2024-06-11 PROCEDURE — 3048F LDL-C <100 MG/DL: CPT | Performed by: STUDENT IN AN ORGANIZED HEALTH CARE EDUCATION/TRAINING PROGRAM

## 2024-06-11 PROCEDURE — 3074F SYST BP LT 130 MM HG: CPT | Performed by: STUDENT IN AN ORGANIZED HEALTH CARE EDUCATION/TRAINING PROGRAM

## 2024-06-11 PROCEDURE — 1036F TOBACCO NON-USER: CPT | Performed by: STUDENT IN AN ORGANIZED HEALTH CARE EDUCATION/TRAINING PROGRAM

## 2024-06-11 PROCEDURE — 3061F NEG MICROALBUMINURIA REV: CPT | Performed by: STUDENT IN AN ORGANIZED HEALTH CARE EDUCATION/TRAINING PROGRAM

## 2024-06-11 PROCEDURE — 3044F HG A1C LEVEL LT 7.0%: CPT | Performed by: STUDENT IN AN ORGANIZED HEALTH CARE EDUCATION/TRAINING PROGRAM

## 2024-06-11 PROCEDURE — 99214 OFFICE O/P EST MOD 30 MIN: CPT | Performed by: STUDENT IN AN ORGANIZED HEALTH CARE EDUCATION/TRAINING PROGRAM

## 2024-06-11 PROCEDURE — 3078F DIAST BP <80 MM HG: CPT | Performed by: STUDENT IN AN ORGANIZED HEALTH CARE EDUCATION/TRAINING PROGRAM

## 2024-06-11 RX ORDER — HYDROCORTISONE 10 MG/1
TABLET ORAL
Qty: 300 TABLET | Refills: 3 | Status: SHIPPED | OUTPATIENT
Start: 2024-06-11

## 2024-06-11 RX ORDER — ICOSAPENT ETHYL 1 G/1
2 CAPSULE ORAL
Qty: 360 CAPSULE | Refills: 3 | Status: SHIPPED | OUTPATIENT
Start: 2024-06-11 | End: 2024-06-11

## 2024-06-11 RX ORDER — DEXAMETHASONE SODIUM PHOSPHATE 4 MG/ML
4 INJECTION, SOLUTION INTRA-ARTICULAR; INTRALESIONAL; INTRAMUSCULAR; INTRAVENOUS; SOFT TISSUE ONCE
Qty: 1 ML | Refills: 3 | Status: SHIPPED | OUTPATIENT
Start: 2024-06-11 | End: 2024-06-11

## 2024-06-11 RX ORDER — METFORMIN HYDROCHLORIDE 500 MG/1
500 TABLET ORAL DAILY
Qty: 90 TABLET | Refills: 1 | Status: SHIPPED | OUTPATIENT
Start: 2024-06-11 | End: 2024-12-08

## 2024-06-11 RX ORDER — LEVOTHYROXINE SODIUM 137 UG/1
137 TABLET ORAL DAILY
Qty: 90 TABLET | Refills: 3 | Status: SHIPPED | OUTPATIENT
Start: 2024-06-11 | End: 2025-06-11

## 2024-06-11 RX ORDER — ICOSAPENT ETHYL 1000 MG/1
2 CAPSULE ORAL
Qty: 360 CAPSULE | Refills: 3 | Status: SHIPPED | OUTPATIENT
Start: 2024-06-11 | End: 2025-06-11

## 2024-06-11 NOTE — PROGRESS NOTES
56 F PMH: NSCLC dx in 2020with mets to brain s/p gamma knife on keytudra from 2021 to 2023, PAD, subclavian steal      AI and thyroiditis, previously seen Dr. Wayne     1) AI   Had low cortisol sometime in 2022 related to immunotherapy  And started on HC 20-10     2) hypothryoidism  Alternating 175 and 150mcg   Most recent TFT euthryoid     3) DM  COLT mildly positive 0.18  C peptide in 7/2023 still detectable, 6.5 in 2024  Foot exam-jan 2024   Atorvastatin 20  TG not at goal     Current regimen:  Metformin 500mg daily  Trulicity 0.75mg weekly    Previously  repaglinide    Past Medical History:   Diagnosis Date    Anxiety 12/2022    Bitten or stung by nonvenomous insect and other nonvenomous arthropods, initial encounter 10/12/2020    Tick bite    Encounter for general adult medical examination without abnormal findings 02/08/2022    Annual physical exam    Encounter for immunization 02/25/2021    Encounter for immunization    Encounter for screening for malignant neoplasm of colon 05/09/2022    Screening for colon cancer    GERD (gastroesophageal reflux disease) 4/2020    Personal history of nicotine dependence 10/12/2022    History of tobacco abuse    Personal history of other (healed) physical injury and trauma 06/24/2022    History of strain    Personal history of other diseases of the digestive system     History of gastroesophageal reflux (GERD)    Personal history of other medical treatment 02/08/2022    History of screening mammography    Personal history of other specified conditions 02/10/2022    History of nausea and vomiting    Personal history of other specified conditions 12/14/2017    History of chest pain    Personal history of other specified conditions 12/14/2017    History of dysphagia    Personal history of other specified conditions 02/19/2020    History of dizziness    Tobacco abuse counseling 06/21/2018    Encounter for smoking cessation counseling    Upper abdominal pain, unspecified  02/08/2022    Upper abdominal pain of unknown etiology     Family History   Problem Relation Name Age of Onset    Other (Cardiac Disorder) Mother Adela casiano     Diabetes Mother Adela casiano     Kidney disease Mother Adela casiano     Breast cancer Mother Adela casiano     Other (Peripheral Arterial Disease) Mother Adela casiano     Colon polyps Mother Adela casiano     Other (Peripheral Arterial Disease) Sister      Hyperlipidemia Other Sibling      Social History     Socioeconomic History    Marital status: Single     Spouse name: Not on file    Number of children: Not on file    Years of education: Not on file    Highest education level: Not on file   Occupational History    Not on file   Tobacco Use    Smoking status: Former     Current packs/day: 1.00     Average packs/day: 1 pack/day for 40.0 years (40.0 ttl pk-yrs)     Types: Cigarettes    Smokeless tobacco: Never   Vaping Use    Vaping status: Never Used   Substance and Sexual Activity    Alcohol use: Not Currently    Drug use: Never    Sexual activity: Not Currently     Partners: Female     Birth control/protection: None   Other Topics Concern    Not on file   Social History Narrative    Not on file     Social Determinants of Health     Financial Resource Strain: Not on file   Food Insecurity: Not on file   Transportation Needs: Not on file   Physical Activity: Not on file   Stress: Not on file   Social Connections: Not on file   Intimate Partner Violence: Not on file   Housing Stability: Not on file     ROS reviewed and is negative except for pertinent findings noted on HPI    Physical Exam  Constitutional:       Appearance: Normal appearance.   Neck:      Comments: No goiter  Cardiovascular:      Rate and Rhythm: Normal rate and regular rhythm.   Abdominal:      Palpations: Abdomen is soft.   Skin:     General: Skin is warm.      Comments: No lipodystrophy   Neurological:      Mental Status: She is alert.   Psychiatric:         Mood  and Affect: Mood normal.         Behavior: Behavior normal.         labs and imaging reviewed, pertinent findings listed on HPI and Impression    Problem List Items Addressed This Visit       Adrenal insufficiency (Multi)    Relevant Medications    hydrocortisone (Cortef) 10 mg tablet    dexAMETHasone (Decadron) 4 mg/mL injection    Other Relevant Orders    C-Peptide    Diabetes (Multi)    Relevant Medications    metFORMIN (Glucophage) 500 mg tablet    dulaglutide (Trulicity) 0.75 mg/0.5 mL pen injector    Other Relevant Orders    Hemoglobin A1C    Lipid Panel    Renal Function Panel    C-Peptide    Mixed hyperlipidemia - Primary    Relevant Medications    Vascepa 1 gram capsule    Other Relevant Orders    Lipid Panel    Renal Function Panel    Hypothyroidism    Relevant Medications    levothyroxine (Synthroid, Levoxyl) 137 mcg tablet    Other Relevant Orders    Thyroid Stimulating Hormone    Thyroxine, Free         1) continue HC 20-10  2) LT4 decrease to 137mcg daily   3) DM2, immunotherapy related? Diabetes also diagnosed after immunotherapy was started but typically presents as type 1 diabetes      Very mildly positive COLT but does have symptoms of insulin resistance, C peptide remaining positive       Continue metformin      Continue trulicity 0.75mg weekly  4)HperTG     Still in the 200s despite A1c control      Switch from lovaza to vascepa 2g BID.  She sometimes forgets to take the full dose of lovaza     Labs prior to next visit    Follow up in 6 months

## 2024-06-11 NOTE — PATIENT INSTRUCTIONS
Levothryoxine 175mcg 5 days/week --> 137mcg once daily     HC 20am 10mg in the afternoon     DM:   Metformin daily   Trulicity 0.75mg weekly     For lipid:  Continue atorvastatin   We will try to switch lovaza--> vascepa 2g twice daily     Follow up 6 months     Libra Gillespie MD  Divison of Endocrinology   [facility]   Phone: [phone]    option 4, then option 1  Fax: [phone]

## 2024-06-12 ENCOUNTER — APPOINTMENT (OUTPATIENT)
Dept: RADIOLOGY | Facility: HOSPITAL | Age: 59
End: 2024-06-12
Payer: COMMERCIAL

## 2024-06-14 ENCOUNTER — TELEPHONE (OUTPATIENT)
Dept: PRIMARY CARE | Facility: CLINIC | Age: 59
End: 2024-06-14
Payer: MEDICARE

## 2024-06-14 NOTE — TELEPHONE ENCOUNTER
Pt called in requesting a refill on:     ondansetron (Zofran) 4 mg tablet   Take 1 tablet (4 mg) by mouth every 8 hours if needed for nausea.   Quantity: 90    Pt wants the one that melts under tongue       [id number] - [address]   Phone: [phone]

## 2024-06-18 DIAGNOSIS — R11.0 NAUSEA: ICD-10-CM

## 2024-06-18 RX ORDER — ONDANSETRON 4 MG/1
4 TABLET, ORALLY DISINTEGRATING ORAL EVERY 8 HOURS PRN
Qty: 30 TABLET | Refills: 0 | Status: CANCELLED | OUTPATIENT
Start: 2024-06-18

## 2024-06-19 RX ORDER — ONDANSETRON 4 MG/1
4 TABLET, ORALLY DISINTEGRATING ORAL EVERY 8 HOURS PRN
Qty: 30 TABLET | Refills: 0 | Status: SHIPPED | OUTPATIENT
Start: 2024-06-19

## 2024-06-24 ENCOUNTER — TELEPHONE (OUTPATIENT)
Dept: RADIATION ONCOLOGY | Facility: HOSPITAL | Age: 59
End: 2024-06-24
Payer: COMMERCIAL

## 2024-06-24 NOTE — TELEPHONE ENCOUNTER
Called pt to remind of appointment on 6/25/24 at 10:30 Pt confirmed appointment.     Jourdan Grant MA

## 2024-06-25 ENCOUNTER — APPOINTMENT (OUTPATIENT)
Dept: RADIATION ONCOLOGY | Facility: HOSPITAL | Age: 59
End: 2024-06-25
Payer: COMMERCIAL

## 2024-06-25 ENCOUNTER — HOSPITAL ENCOUNTER (OUTPATIENT)
Dept: RADIOLOGY | Facility: HOSPITAL | Age: 59
Discharge: HOME | End: 2024-06-25
Payer: COMMERCIAL

## 2024-06-25 ENCOUNTER — HOSPITAL ENCOUNTER (OUTPATIENT)
Dept: RADIATION ONCOLOGY | Facility: HOSPITAL | Age: 59
Setting detail: RADIATION/ONCOLOGY SERIES
Discharge: HOME | End: 2024-06-25
Payer: COMMERCIAL

## 2024-06-25 VITALS
WEIGHT: 178 LBS | OXYGEN SATURATION: 98 % | HEART RATE: 74 BPM | TEMPERATURE: 96.8 F | SYSTOLIC BLOOD PRESSURE: 111 MMHG | RESPIRATION RATE: 18 BRPM | BODY MASS INDEX: 28.73 KG/M2 | DIASTOLIC BLOOD PRESSURE: 74 MMHG

## 2024-06-25 DIAGNOSIS — C79.31 LUNG CANCER METASTATIC TO BRAIN (MULTI): ICD-10-CM

## 2024-06-25 DIAGNOSIS — C34.90 LUNG CANCER METASTATIC TO BRAIN (MULTI): ICD-10-CM

## 2024-06-25 PROCEDURE — A9575 INJ GADOTERATE MEGLUMI 0.1ML: HCPCS | Mod: SE | Performed by: NURSE PRACTITIONER

## 2024-06-25 PROCEDURE — 99214 OFFICE O/P EST MOD 30 MIN: CPT | Performed by: NURSE PRACTITIONER

## 2024-06-25 PROCEDURE — 70553 MRI BRAIN STEM W/O & W/DYE: CPT

## 2024-06-25 PROCEDURE — 2550000001 HC RX 255 CONTRASTS: Mod: SE | Performed by: NURSE PRACTITIONER

## 2024-06-25 RX ORDER — GADOTERATE MEGLUMINE 376.9 MG/ML
16 INJECTION INTRAVENOUS
Status: COMPLETED | OUTPATIENT
Start: 2024-06-25 | End: 2024-06-25

## 2024-06-25 ASSESSMENT — PAIN SCALES - GENERAL: PAINLEVEL: 0-NO PAIN

## 2024-06-25 ASSESSMENT — PATIENT HEALTH QUESTIONNAIRE - PHQ9
2. FEELING DOWN, DEPRESSED OR HOPELESS: NOT AT ALL
SUM OF ALL RESPONSES TO PHQ9 QUESTIONS 1 AND 2: 0
1. LITTLE INTEREST OR PLEASURE IN DOING THINGS: NOT AT ALL

## 2024-06-25 ASSESSMENT — ENCOUNTER SYMPTOMS
OCCASIONAL FEELINGS OF UNSTEADINESS: 0
LOSS OF SENSATION IN FEET: 0
DEPRESSION: 1

## 2024-06-25 NOTE — PROGRESS NOTES
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 She was treated with GK SRS on 2021 to a right occipital lesion consisting of a dose of 20 Gy.     Single agent pembrolizumab under EA 5163 initiated 03/10/21.    SUBJECTIVE  History of Present Illness:   Betsy Sams is  She says she is doing well and completed 2 yrs of  immunotherapy in 2023 and continues responding to treatment. Continues surveillance imaging and FU with Dr. Owen with PET scan done in April which showed increased metabolic activity in mediastinal lymph nodes and pulmonary nodules.  Bronchoscopy with biopsy neg for malignancy. Now on surveillance. She says she feels well and managing all of her usual ADLs. She denies headaches, seizures or any recent falls.  No cough, SOB, chest pain, GI complaints or bony pain. Depression seems to be managed well with current medications.     Review of Systems:    Review of Systems   All other systems reviewed and are negative.    Performance Status:   The Karnofsky performance scale today is 100, Fully active, able to carry on all pre-disease performed without restriction (ECOG equivalent 0).      OBJECTIVE    Current Outpatient Medications:     albuterol (Ventolin HFA) 90 mcg/actuation inhaler, Inhale 2 puffs every 4 hours if needed for wheezing or shortness of breath., Disp: 18 g, Rfl: 1    alcohol swabs pads, medicated, use as directed once daily, Disp: , Rfl:     " aspirin 81 mg chewable tablet, Chew 1 tablet (81 mg) once daily., Disp: , Rfl:     atorvastatin (Lipitor) 20 mg tablet, Take 1 tablet (20 mg) by mouth once daily., Disp: 90 tablet, Rfl: 1    BD Luer-Haily Syringe 3 mL 25 gauge x 1\" syringe, use as directed, Disp: , Rfl:     clopidogrel (Plavix) 75 mg tablet, Take 1 tablet (75 mg) by mouth once daily., Disp: 90 tablet, Rfl: 1    cyanocobalamin (Vitamin B-12) 1,000 mcg tablet, Take 1 tablet (1,000 mcg) by mouth once daily., Disp: 90 tablet, Rfl: 1    dulaglutide (Trulicity) 0.75 mg/0.5 mL pen injector, Inject 0.75 mg under the skin 1 (one) time per week., Disp: 4 each, Rfl: 11    ergocalciferol (Vitamin D-2) 1.25 MG (37667 UT) capsule, Take 1 capsule (50,000 Units) by mouth 1 (one) time per week., Disp: 12 capsule, Rfl: 0    escitalopram (Lexapro) 5 mg tablet, Take 1 tablet (5 mg) by mouth once daily., Disp: 90 tablet, Rfl: 1    famotidine (Pepcid) 20 mg tablet, Take 1 tablet (20 mg) by mouth 2 times a day., Disp: 60 tablet, Rfl: 5    hydrocortisone (Cortef) 10 mg tablet, 20mg in the morning and 10mg in the afternoon, Disp: 300 tablet, Rfl: 3    ipratropium (Atrovent) 21 mcg (0.03 %) nasal spray, Administer 2 sprays into each nostril. 2-3 times daily, Disp: , Rfl:     levothyroxine (Synthroid, Levoxyl) 137 mcg tablet, Take 1 tablet (137 mcg) by mouth early in the morning.., Disp: 90 tablet, Rfl: 3    metFORMIN (Glucophage) 500 mg tablet, Take 1 tablet (500 mg) by mouth once daily. Take with food., Disp: 90 tablet, Rfl: 1    Microlet Lancet misc, use 1 LANCET to TEST BLOOD SUGAR once daily as directed, Disp: , Rfl:     ondansetron ODT (Zofran-ODT) 4 mg disintegrating tablet, Take 1 tablet (4 mg) by mouth every 8 hours if needed for nausea or vomiting., Disp: 30 tablet, Rfl: 0    OneTouch Ultra Test strip, use 1 TEST STRIP to TEST BLOOD SUGAR once daily, Disp: , Rfl:     pantoprazole (ProtoNix) 40 mg EC tablet, Take 1 tablet (40 mg) by mouth once daily in the morning. " Take before meals. 30 MINUTES PRIOR TO BREAKFAST, Disp: 90 tablet, Rfl: 1    traZODone (Desyrel) 50 mg tablet, Take 1 tablet (50 mg) by mouth as needed at bedtime for sleep., Disp: 90 tablet, Rfl: 1    Vascepa 1 gram capsule, Take 2 capsules (2 g) by mouth 2 times daily (morning and late afternoon)., Disp: 360 capsule, Rfl: 3  No current facility-administered medications for this encounter.     Physical Exam  Constitutional:       Appearance: Normal appearance.   HENT:      Head: Normocephalic and atraumatic.      Nose: Nose normal.      Mouth/Throat:      Mouth: Mucous membranes are moist.      Pharynx: Oropharynx is clear.   Eyes:      Extraocular Movements: Extraocular movements intact.      Conjunctiva/sclera: Conjunctivae normal.      Pupils: Pupils are equal, round, and reactive to light.   Cardiovascular:      Rate and Rhythm: Normal rate and regular rhythm.      Heart sounds: Normal heart sounds.   Pulmonary:      Breath sounds: Normal breath sounds.   Abdominal:      Palpations: Abdomen is soft.   Musculoskeletal:         General: Normal range of motion.      Cervical back: Normal range of motion and neck supple.   Lymphadenopathy:      Cervical: No cervical adenopathy.   Skin:     General: Skin is warm and dry.   Neurological:      General: No focal deficit present.      Mental Status: She is alert and oriented to person, place, and time.      Cranial Nerves: No cranial nerve deficit.      Sensory: No sensory deficit.      Motor: No weakness.      Coordination: Coordination normal.      Gait: Gait normal.   Psychiatric:         Mood and Affect: Mood normal.         Behavior: Behavior normal.       RESULTS:  MR brain w and wo IV contrast    Result Date: 6/25/2024  Interpreted By:  Phil Arellano  and Misael Clements STUDY: MR BRAIN W AND WO IV CONTRAST;  6/25/2024 10:45 am   INDICATION: Signs/Symptoms:adenocarcinoma of the right lower lung with mediastinal lymph node metastases and a single brain metastasis,  [id number], Stage HEAVEN s/p GK SRS (01/20/2021). Eval for progressive disease..   COMPARISON: MRI brain 03/26/2024, 01/17/2024   ACCESSION NUMBER(S): [id number]   ORDERING CLINICIAN: MYNOR SHAFFER   TECHNIQUE: Axial T2, FLAIR, DWI, gradient echo T2 and T1 weighted images of brain were acquired. Post contrast T1 weighted images were acquired after administration of 16 mL of Dotarem gadolinium based intravenous contrast.   FINDINGS: Again present is linear focal enhancement measuring 0.4 cm within the right posterior occipital lobe at the location of prior gamma knife therapy with normal decreased size from recent prior brain MRI 03/26/2024 at which time measured 5 mm. Subtle diffusion restriction abnormality and FLAIR abnormality at the level of the enhancement is nonspecific. There is associated susceptibility artifact favoring hemosiderin deposition.   Similarly there is redemonstration of focal enhancement involving the right occipital bone, also similar to recent priors.   There is no diffusion restriction abnormality suggest acute infarct. The ventricles, sulci, and basal cisterns are normal in size. No extra-axial collection.   T2 hyperintense and FLAIR hypointense foci are present within the basal ganglia favoring perivascular spaces.   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   Minimal opacification of the maxillary sinuses. The paranasal sinuses and mastoid air cells are otherwise clear.       1. Posttreatment changes of a right occipital lesion with persistent enhancement, though slightly decreased in size from previous MRI 03/26/2024 currently measuring 4 mm, previously 5 mm. Findings may represent posttreatment change. Continued follow-up recommended to confirm continued resolution. 2. Similar focal enhancement within the adjacent right occipital bone, nonspecific may represent post radiation change versus metastatic lesion. 3. No new or enlarging metastatic lesions  visualized.   I personally reviewed the images/study and I agree with the resident findings as stated by Starr Douglas MD. This study was interpreted at [facility], [place], [place].   MACRO: None   Signed by: Phil Arellano 6/25/2024 12:09 PM Dictation workstation:   [id number]      ASSESSMENT:  58 y.o. 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 We reviewed her brain MRI today which again shows treatment changes.  Plan: MRI in 3-4 mo. FUV in rad onc after imaging.     Aviva Jeffrey CNP  [phone]

## 2024-07-29 ENCOUNTER — TELEPHONE (OUTPATIENT)
Dept: PRIMARY CARE | Facility: CLINIC | Age: 59
End: 2024-07-29
Payer: COMMERCIAL

## 2024-07-29 NOTE — TELEPHONE ENCOUNTER
Pt needs a refill on   metFORMIN (Glucophage) 500 mg tablet 90 day supply   cyanocobalamin (Vitamin B-12) 1,000 mcg tablet   ergocalciferol (Vitamin D-2) 1.25 MG (72281 UT) capsule   escitalopram (Lexapro) 5 mg tablet   atorvastatin (Lipitor) 20 mg tablet   clopidogrel (Plavix) 75 mg tablet   pantoprazole (ProtoNix) 40 mg EC tablet     [id number] - [place], [place] - [address]   Phone: [phone]

## 2024-08-02 DIAGNOSIS — K21.9 GASTROESOPHAGEAL REFLUX DISEASE, UNSPECIFIED WHETHER ESOPHAGITIS PRESENT: ICD-10-CM

## 2024-08-02 DIAGNOSIS — E78.1 HYPERTRIGLYCERIDEMIA: ICD-10-CM

## 2024-08-02 DIAGNOSIS — E53.8 VITAMIN B12 DEFICIENCY: ICD-10-CM

## 2024-08-02 DIAGNOSIS — E55.9 VITAMIN D DEFICIENCY: ICD-10-CM

## 2024-08-02 DIAGNOSIS — E11.65 TYPE 2 DIABETES MELLITUS WITH HYPERGLYCEMIA, WITHOUT LONG-TERM CURRENT USE OF INSULIN (MULTI): ICD-10-CM

## 2024-08-02 DIAGNOSIS — F32.A DEPRESSION, UNSPECIFIED DEPRESSION TYPE: ICD-10-CM

## 2024-08-02 DIAGNOSIS — F41.9 ANXIETY: ICD-10-CM

## 2024-08-02 RX ORDER — LANOLIN ALCOHOL/MO/W.PET/CERES
1000 CREAM (GRAM) TOPICAL DAILY
Qty: 90 TABLET | Refills: 1 | Status: SHIPPED | OUTPATIENT
Start: 2024-08-02 | End: 2025-01-29

## 2024-08-02 RX ORDER — METFORMIN HYDROCHLORIDE 500 MG/1
500 TABLET ORAL DAILY
Qty: 90 TABLET | Refills: 1 | Status: SHIPPED | OUTPATIENT
Start: 2024-08-02 | End: 2025-01-29

## 2024-08-02 RX ORDER — ATORVASTATIN CALCIUM 20 MG/1
20 TABLET, FILM COATED ORAL DAILY
Qty: 90 TABLET | Refills: 1 | Status: SHIPPED | OUTPATIENT
Start: 2024-08-02 | End: 2025-01-29

## 2024-08-02 RX ORDER — ERGOCALCIFEROL 1.25 MG/1
50000 CAPSULE ORAL
Qty: 12 CAPSULE | Refills: 1 | Status: SHIPPED | OUTPATIENT
Start: 2024-08-04 | End: 2025-01-19

## 2024-08-02 RX ORDER — PANTOPRAZOLE SODIUM 40 MG/1
40 TABLET, DELAYED RELEASE ORAL
Qty: 90 TABLET | Refills: 1 | Status: SHIPPED | OUTPATIENT
Start: 2024-08-02 | End: 2025-01-29

## 2024-08-02 RX ORDER — ESCITALOPRAM OXALATE 5 MG/1
5 TABLET ORAL DAILY
Qty: 90 TABLET | Refills: 1 | Status: SHIPPED | OUTPATIENT
Start: 2024-08-02 | End: 2025-01-29

## 2024-08-02 NOTE — TELEPHONE ENCOUNTER
Patient called in stated that she did not need the metformin at this moment but did need the six other medications, and would like 3 refills, 90 day supply on each of them.

## 2024-08-05 DIAGNOSIS — C34.31 PRIMARY CANCER OF RIGHT LOWER LOBE OF LUNG (MULTI): ICD-10-CM

## 2024-08-27 ENCOUNTER — SOCIAL WORK (OUTPATIENT)
Dept: CASE MANAGEMENT | Facility: HOSPITAL | Age: 59
End: 2024-08-27
Payer: COMMERCIAL

## 2024-08-27 NOTE — PROGRESS NOTES
SW received mail for patient from [facility] in care of the previous SW at [facility]. SW called patient to make her aware and asked if SW could assist with changing the address. Sw called [facility] and patient will have to change by calling member services [phone] as provider cannot change. SW provided patient with number. Sw mailed the information to patient today.

## 2024-08-30 ENCOUNTER — OFFICE VISIT (OUTPATIENT)
Dept: URGENT CARE | Facility: CLINIC | Age: 59
End: 2024-08-30
Payer: COMMERCIAL

## 2024-08-30 VITALS
TEMPERATURE: 98.5 F | OXYGEN SATURATION: 95 % | HEART RATE: 83 BPM | DIASTOLIC BLOOD PRESSURE: 76 MMHG | SYSTOLIC BLOOD PRESSURE: 121 MMHG | BODY MASS INDEX: 28.87 KG/M2 | HEIGHT: 66 IN | WEIGHT: 179.6 LBS

## 2024-08-30 DIAGNOSIS — J01.90 SUBACUTE SINUSITIS, UNSPECIFIED LOCATION: ICD-10-CM

## 2024-08-30 DIAGNOSIS — J34.89 SINUS PRESSURE: Primary | ICD-10-CM

## 2024-08-30 PROBLEM — U07.1 DISEASE DUE TO SEVERE ACUTE RESPIRATORY SYNDROME CORONAVIRUS 2 (SARS-COV-2): Status: RESOLVED | Noted: 2023-08-08 | Resolved: 2024-08-30

## 2024-08-30 PROBLEM — R13.10 DYSPHAGIA: Status: ACTIVE | Noted: 2024-08-30

## 2024-08-30 PROBLEM — Z12.12 ENCOUNTER FOR COLORECTAL CANCER SCREENING: Status: RESOLVED | Noted: 2023-03-26 | Resolved: 2024-08-30

## 2024-08-30 PROBLEM — T14.8XXA MUSCLE STRAIN: Status: RESOLVED | Noted: 2024-08-30 | Resolved: 2024-08-30

## 2024-08-30 PROBLEM — S37.009A INJURY OF KIDNEY: Status: ACTIVE | Noted: 2023-08-10

## 2024-08-30 PROBLEM — Z90.710 ACQUIRED ABSENCE OF BOTH CERVIX AND UTERUS: Status: ACTIVE | Noted: 2023-08-10

## 2024-08-30 PROBLEM — J96.21 ACUTE AND CHRONIC RESPIRATORY FAILURE WITH HYPOXIA (MULTI): Status: ACTIVE | Noted: 2023-08-10

## 2024-08-30 PROBLEM — E27.2 ADRENAL CRISIS (MULTI): Status: RESOLVED | Noted: 2024-08-30 | Resolved: 2024-08-30

## 2024-08-30 PROBLEM — R11.2 NAUSEA AND VOMITING: Status: RESOLVED | Noted: 2024-08-30 | Resolved: 2024-08-30

## 2024-08-30 PROBLEM — E83.42 HYPOMAGNESEMIA: Status: ACTIVE | Noted: 2024-08-30

## 2024-08-30 PROBLEM — R91.1 SOLITARY PULMONARY NODULE: Status: ACTIVE | Noted: 2022-08-15

## 2024-08-30 PROBLEM — N18.9 CHRONIC KIDNEY DISEASE: Status: ACTIVE | Noted: 2024-08-30

## 2024-08-30 PROBLEM — E27.40 HYPOADRENALISM (MULTI): Status: ACTIVE | Noted: 2024-08-30

## 2024-08-30 PROBLEM — T30.0 PARTIAL THICKNESS BURNS OF MULTIPLE SITES: Status: RESOLVED | Noted: 2022-10-09 | Resolved: 2024-08-30

## 2024-08-30 PROBLEM — J96.00 ACUTE RESPIRATORY FAILURE (MULTI): Status: ACTIVE | Noted: 2024-08-30

## 2024-08-30 PROBLEM — L23.7 POISON IVY DERMATITIS: Status: RESOLVED | Noted: 2023-07-05 | Resolved: 2024-08-30

## 2024-08-30 PROBLEM — I83.90 VARICOSE VEINS OF LOWER EXTREMITY: Status: ACTIVE | Noted: 2024-08-30

## 2024-08-30 PROBLEM — J32.9 CHRONIC SINUSITIS: Status: ACTIVE | Noted: 2024-08-30

## 2024-08-30 PROBLEM — K43.2 INCISIONAL HERNIA: Status: ACTIVE | Noted: 2024-08-30

## 2024-08-30 PROBLEM — B35.1 ONYCHOMYCOSIS OF TOENAIL: Status: ACTIVE | Noted: 2024-08-30

## 2024-08-30 PROBLEM — Z12.11 ENCOUNTER FOR COLORECTAL CANCER SCREENING: Status: RESOLVED | Noted: 2023-03-26 | Resolved: 2024-08-30

## 2024-08-30 PROBLEM — R07.9 CHEST PAIN: Status: RESOLVED | Noted: 2024-08-30 | Resolved: 2024-08-30

## 2024-08-30 PROBLEM — D84.9 IMMUNODEFICIENCY, UNSPECIFIED (MULTI): Status: ACTIVE | Noted: 2023-08-10

## 2024-08-30 LAB — POC SARS-COV-2 AG BINAX: NORMAL

## 2024-08-30 PROCEDURE — 87636 SARSCOV2 & INF A&B AMP PRB: CPT

## 2024-08-30 RX ORDER — AMOXICILLIN AND CLAVULANATE POTASSIUM 875; 125 MG/1; MG/1
1 TABLET, FILM COATED ORAL 2 TIMES DAILY
Qty: 14 TABLET | Refills: 0 | Status: SHIPPED | OUTPATIENT
Start: 2024-08-30 | End: 2024-09-06

## 2024-08-30 RX ORDER — FLUTICASONE PROPIONATE 50 MCG
1 SPRAY, SUSPENSION (ML) NASAL DAILY
Qty: 16 G | Refills: 0 | Status: SHIPPED | OUTPATIENT
Start: 2024-08-30 | End: 2025-08-30

## 2024-08-30 RX ORDER — GUAIFENESIN, PSEUDOEPHEDRINE HYDROCHLORIDE 600; 60 MG/1; MG/1
1 TABLET, EXTENDED RELEASE ORAL EVERY 12 HOURS
Qty: 20 TABLET | Refills: 0 | Status: SHIPPED | OUTPATIENT
Start: 2024-08-30 | End: 2024-09-09

## 2024-08-30 RX ORDER — OMEGA-3-ACID ETHYL ESTERS 1 G/1
CAPSULE, LIQUID FILLED ORAL
COMMUNITY
Start: 2022-07-26

## 2024-08-30 ASSESSMENT — ENCOUNTER SYMPTOMS
WOUND: 0
FACIAL SWELLING: 0
DIZZINESS: 0
VOICE CHANGE: 0
WEAKNESS: 0
SORE THROAT: 1
CHILLS: 0
SINUS PRESSURE: 1
EYE PAIN: 0
DIAPHORESIS: 0
MUSCULOSKELETAL NEGATIVE: 1
FEVER: 0
RHINORRHEA: 1
CARDIOVASCULAR NEGATIVE: 1
SHORTNESS OF BREATH: 0
CHEST TIGHTNESS: 0
HOARSE VOICE: 0
FATIGUE: 0
MYALGIAS: 0
COUGH: 0
RESPIRATORY NEGATIVE: 1
SINUS PAIN: 1
NECK PAIN: 0
CONSTITUTIONAL NEGATIVE: 1
LIGHT-HEADEDNESS: 0
ARTHRALGIAS: 0
PALPITATIONS: 0
VOMITING: 0
HEADACHES: 1
DIARRHEA: 0
EYE REDNESS: 0
NAUSEA: 1
ABDOMINAL PAIN: 0
SWOLLEN GLANDS: 0
COLOR CHANGE: 0
TROUBLE SWALLOWING: 0

## 2024-08-30 ASSESSMENT — VISUAL ACUITY: OU: 1

## 2024-08-30 NOTE — PROGRESS NOTES
Subjective   History  Betsy Sams is a 58 y.o. female who presents for Sore Throat.    Patient presents sinus congestion/drainage and facial pressure worsening over the last 2-3 days. She reports a history of environmental allergies that have been acting up and recurrent sinus infections as well as current lung cancer and emphysema.       History provided by:  Patient and medical records   used: No    Sore Throat   The current episode started in the past 7 days. The problem has been gradually worsening. Neither side of throat is experiencing more pain than the other. There has been no fever. Associated symptoms include congestion and headaches. Pertinent negatives include no abdominal pain, coughing, diarrhea, drooling, ear discharge, ear pain, hoarse voice, plugged ear sensation, neck pain, shortness of breath, swollen glands, trouble swallowing or vomiting. She has tried nothing for the symptoms.     Past Surgical History:   Procedure Laterality Date    APPENDECTOMY  12/14/2017    Appendectomy    CT ANGIO NECK  1/6/2020    CT NECK ANGIO W AND WO IV CONTRAST 1/6/2020 [facility]    CT GUIDED PERCUTANEOUS BIOPSY LUNG  12/7/2020    CT GUIDED PERCUTANEOUS BIOPSY LUNG 12/7/2020 [facility]    HYSTERECTOMY  12/14/2017    Hysterectomy    OTHER SURGICAL HISTORY  02/19/2020    Aorta to subclavian and carotid arterial bypass    TONSILLECTOMY  12/14/2017    Tonsillectomy     Social History     Tobacco Use    Smoking status: Former     Current packs/day: 1.00     Average packs/day: 1 pack/day for 40.0 years (40.0 ttl pk-yrs)     Types: Cigarettes    Smokeless tobacco: Never   Vaping Use    Vaping status: Never Used   Substance Use Topics    Alcohol use: Not Currently    Drug use: Never       Review of Systems   Review of Systems   Constitutional: Negative.  Negative for chills, diaphoresis, fatigue and fever.   HENT:  Positive for congestion, postnasal drip, rhinorrhea, sinus pressure,  "sinus pain and sore throat. Negative for dental problem, drooling, ear discharge, ear pain, facial swelling, hoarse voice, tinnitus, trouble swallowing and voice change.    Eyes:  Negative for pain and redness.   Respiratory: Negative.  Negative for cough, chest tightness and shortness of breath.    Cardiovascular: Negative.  Negative for chest pain and palpitations.   Gastrointestinal:  Positive for nausea. Negative for abdominal pain, diarrhea and vomiting.   Musculoskeletal: Negative.  Negative for arthralgias, myalgias and neck pain.   Skin: Negative.  Negative for color change, rash and wound.   Neurological:  Positive for headaches. Negative for dizziness, weakness and light-headedness.       Objective   Vital Signs  /76 (BP Location: Left arm, Patient Position: Sitting, BP Cuff Size: Small adult)   Pulse 83   Temp 36.9 °C (98.5 °F) (Oral)   Ht 1.676 m (5' 6\")   Wt 81.5 kg (179 lb 9.6 oz)   SpO2 95%   BMI 28.99 kg/m²    All vitals have been reviewed and are stable.     Physical Exam  Physical Exam  Vitals and nursing note reviewed.   Constitutional:       General: She is not in acute distress.     Appearance: Normal appearance. She is not ill-appearing.   HENT:      Head: Normocephalic and atraumatic. No right periorbital erythema or left periorbital erythema.      Jaw: There is normal jaw occlusion.      Right Ear: Tympanic membrane, ear canal and external ear normal.      Left Ear: Tympanic membrane, ear canal and external ear normal.      Nose: Mucosal edema, congestion and rhinorrhea present.      Right Sinus: Maxillary sinus tenderness present.      Left Sinus: Maxillary sinus tenderness present.      Mouth/Throat:      Lips: Pink. No lesions.      Mouth: Mucous membranes are moist.      Palate: No lesions.      Pharynx: Uvula midline. Posterior oropharyngeal erythema and postnasal drip present. No uvula swelling.      Tonsils: No tonsillar exudate.   Eyes:      General: Lids are normal. " Vision grossly intact.         Right eye: No discharge.         Left eye: No discharge.      Extraocular Movements: Extraocular movements intact.      Conjunctiva/sclera: Conjunctivae normal.      Right eye: Right conjunctiva is not injected.      Left eye: Left conjunctiva is not injected.      Pupils: Pupils are equal, round, and reactive to light.   Cardiovascular:      Rate and Rhythm: Normal rate and regular rhythm.      Heart sounds: Normal heart sounds.   Pulmonary:      Effort: Pulmonary effort is normal. No tachypnea, accessory muscle usage or respiratory distress.      Breath sounds: Normal breath sounds and air entry. Transmitted upper airway sounds present. No stridor. No wheezing, rhonchi or rales.   Abdominal:      General: Abdomen is flat.      Palpations: Abdomen is soft.   Musculoskeletal:         General: Normal range of motion.      Cervical back: Full passive range of motion without pain, normal range of motion and neck supple.   Lymphadenopathy:      Cervical: No cervical adenopathy.   Skin:     General: Skin is warm and dry.      Coloration: Skin is not pale or sallow.      Findings: No erythema, petechiae or rash.   Neurological:      General: No focal deficit present.      Mental Status: She is alert and oriented to person, place, and time. Mental status is at baseline.   Psychiatric:         Mood and Affect: Mood normal.         Behavior: Behavior normal.         Diagnostic Results   Recent Results (from the past 48 hour(s))   POCT BinaxNOW Covid-19 Ag Card manually resulted    Collection Time: 08/30/24  2:37 PM   Result Value Ref Range    POC FERNIE-COV-2 AG  Presumptive negative test for SARS-CoV-2 (no antigen detected)     Presumptive negative test for SARS-CoV-2 (no antigen detected)       Assessment/Plan   Procedures   N/A    Problem List Items Addressed This Visit    None  Visit Diagnoses       Sinus pressure    -  Primary    Relevant Medications    pseudoephedrine-guaifenesin (Mucinex D)   mg 12 hr tablet    fluticasone (Flonase) 50 mcg/actuation nasal spray    Other Relevant Orders    Sars-CoV-2 PCR    Influenza A, and B PCR    POCT BinaxNOW Covid-19 Ag Card manually resulted (Completed)    Subacute sinusitis, unspecified location        Relevant Medications    pseudoephedrine-guaifenesin (Mucinex D)  mg 12 hr tablet    amoxicillin-pot clavulanate (Augmentin) 875-125 mg tablet    fluticasone (Flonase) 50 mcg/actuation nasal spray            UC Course  Patient disposition: Home    Red flags for reporting to ER have been reviewed with the patient.    Current diagnosis, any medication changes, and all in-office lab or radiologic results have been reviewed with the patient at the time of the visit.   If symptoms do not improve or worsen, patient is to follow up with PCP or report to the emergency room.   Patient is alert and oriented x3 and non-toxic appearing. Vital signs are stable.   Patient and/or guardian has sufficient decision-making capabilities at this time and reports understanding and agreement with the treatment plan made through shared decision-making.

## 2024-08-31 LAB
FLUAV RNA RESP QL NAA+PROBE: NOT DETECTED
FLUBV RNA RESP QL NAA+PROBE: NOT DETECTED
SARS-COV-2 ORF1AB RESP QL NAA+PROBE: NOT DETECTED

## 2024-10-15 ENCOUNTER — TELEPHONE (OUTPATIENT)
Dept: RADIATION ONCOLOGY | Facility: HOSPITAL | Age: 59
End: 2024-10-15
Payer: COMMERCIAL

## 2024-10-16 ENCOUNTER — HOSPITAL ENCOUNTER (OUTPATIENT)
Dept: RADIOLOGY | Facility: HOSPITAL | Age: 59
Discharge: HOME | End: 2024-10-16
Payer: COMMERCIAL

## 2024-10-16 ENCOUNTER — HOSPITAL ENCOUNTER (OUTPATIENT)
Dept: RADIATION ONCOLOGY | Facility: HOSPITAL | Age: 59
Setting detail: RADIATION/ONCOLOGY SERIES
Discharge: HOME | End: 2024-10-16
Payer: COMMERCIAL

## 2024-10-16 VITALS
RESPIRATION RATE: 18 BRPM | BODY MASS INDEX: 29.41 KG/M2 | HEIGHT: 66 IN | DIASTOLIC BLOOD PRESSURE: 68 MMHG | SYSTOLIC BLOOD PRESSURE: 103 MMHG | WEIGHT: 183 LBS | HEART RATE: 79 BPM | OXYGEN SATURATION: 96 % | TEMPERATURE: 96.8 F

## 2024-10-16 DIAGNOSIS — C79.31 LUNG CANCER METASTATIC TO BRAIN (MULTI): ICD-10-CM

## 2024-10-16 DIAGNOSIS — C34.90 LUNG CANCER METASTATIC TO BRAIN (MULTI): ICD-10-CM

## 2024-10-16 PROCEDURE — 2550000001 HC RX 255 CONTRASTS: Mod: SE | Performed by: NURSE PRACTITIONER

## 2024-10-16 PROCEDURE — 99214 OFFICE O/P EST MOD 30 MIN: CPT | Performed by: NURSE PRACTITIONER

## 2024-10-16 PROCEDURE — 70553 MRI BRAIN STEM W/O & W/DYE: CPT

## 2024-10-16 PROCEDURE — A9575 INJ GADOTERATE MEGLUMI 0.1ML: HCPCS | Mod: SE | Performed by: NURSE PRACTITIONER

## 2024-10-16 RX ORDER — GADOTERATE MEGLUMINE 376.9 MG/ML
20 INJECTION INTRAVENOUS
Status: COMPLETED | OUTPATIENT
Start: 2024-10-16 | End: 2024-10-16

## 2024-10-16 ASSESSMENT — ENCOUNTER SYMPTOMS
DEPRESSION: 0
OCCASIONAL FEELINGS OF UNSTEADINESS: 0
LOSS OF SENSATION IN FEET: 0

## 2024-10-16 ASSESSMENT — COLUMBIA-SUICIDE SEVERITY RATING SCALE - C-SSRS
1. IN THE PAST MONTH, HAVE YOU WISHED YOU WERE DEAD OR WISHED YOU COULD GO TO SLEEP AND NOT WAKE UP?: NO
6. HAVE YOU EVER DONE ANYTHING, STARTED TO DO ANYTHING, OR PREPARED TO DO ANYTHING TO END YOUR LIFE?: NO
2. HAVE YOU ACTUALLY HAD ANY THOUGHTS OF KILLING YOURSELF?: NO

## 2024-10-16 ASSESSMENT — PAIN SCALES - GENERAL: PAINLEVEL_OUTOF10: 0-NO PAIN

## 2024-10-16 NOTE — PROGRESS NOTES
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 She was treated with GK SRS on 2021 to a right occipital lesion consisting of a dose of 20 Gy.     Single agent pembrolizumab under EA [id number] initiated 03/10/21.    SUBJECTIVE  History of Present Illness:   Betsy Sams is  She says she is doing well and completed 2 yrs of  immunotherapy in 2023 and continues responding to treatment. Continues surveillance imaging and FU with Med Onc and has appt next week.  She says she feels well and managing all of her usual ADLs. She denies headaches, seizures or any recent falls.  No cough, SOB, chest pain, GI complaints or bony pain. Depression seems to be managed well with current medications.     Review of Systems:    Review of Systems   All other systems reviewed and are negative.    Performance Status:   The Karnofsky performance scale today is 100, Fully active, able to carry on all pre-disease performed without restriction (ECOG equivalent 0).      OBJECTIVE    Current Outpatient Medications:     albuterol (Ventolin HFA) 90 mcg/actuation inhaler, Inhale 2 puffs every 4 hours if needed for wheezing or shortness of breath., Disp: 18 g, Rfl: 1    alcohol swabs pads, medicated, use as directed once daily, Disp: , Rfl:     aspirin 81 mg chewable tablet, Chew 1 tablet (81 mg) once daily., Disp: , Rfl:     atorvastatin  "(Lipitor) 20 mg tablet, Take 1 tablet (20 mg) by mouth once daily., Disp: 90 tablet, Rfl: 1    BD Luer-Haily Syringe 3 mL 25 gauge x 1\" syringe, use as directed, Disp: , Rfl:     clopidogrel (Plavix) 75 mg tablet, Take 1 tablet (75 mg) by mouth once daily., Disp: 90 tablet, Rfl: 1    cyanocobalamin (Vitamin B-12) 1,000 mcg tablet, Take 1 tablet (1,000 mcg) by mouth once daily., Disp: 90 tablet, Rfl: 1    dulaglutide (Trulicity) 0.75 mg/0.5 mL pen injector, Inject 0.75 mg under the skin 1 (one) time per week., Disp: 4 each, Rfl: 11    ergocalciferol (Vitamin D-2) 1.25 MG (79950 UT) capsule, Take 1 capsule (50,000 Units) by mouth 1 (one) time per week., Disp: 12 capsule, Rfl: 1    escitalopram (Lexapro) 5 mg tablet, Take 1 tablet (5 mg) by mouth once daily., Disp: 90 tablet, Rfl: 1    famotidine (Pepcid) 20 mg tablet, Take 1 tablet (20 mg) by mouth 2 times a day., Disp: 60 tablet, Rfl: 5    fluticasone (Flonase) 50 mcg/actuation nasal spray, Administer 1 spray into each nostril once daily. Shake gently. Before first use, prime pump. After use, clean tip and replace cap., Disp: 16 g, Rfl: 0    hydrocortisone (Cortef) 10 mg tablet, 20mg in the morning and 10mg in the afternoon, Disp: 300 tablet, Rfl: 3    ipratropium (Atrovent) 21 mcg (0.03 %) nasal spray, Administer 2 sprays into each nostril. 2-3 times daily, Disp: , Rfl:     levothyroxine (Synthroid, Levoxyl) 137 mcg tablet, Take 1 tablet (137 mcg) by mouth early in the morning.., Disp: 90 tablet, Rfl: 3    metFORMIN (Glucophage) 500 mg tablet, Take 1 tablet (500 mg) by mouth once daily. Take with food., Disp: 90 tablet, Rfl: 1    Microlet Lancet misc, use 1 LANCET to TEST BLOOD SUGAR once daily as directed, Disp: , Rfl:     omega-3 acid ethyl esters (Lovaza) 1 gram capsule, Take by mouth., Disp: , Rfl:     ondansetron ODT (Zofran-ODT) 4 mg disintegrating tablet, Take 1 tablet (4 mg) by mouth every 8 hours if needed for nausea or vomiting., Disp: 30 tablet, Rfl: 0    " OneTouch Ultra Test strip, use 1 TEST STRIP to TEST BLOOD SUGAR once daily, Disp: , Rfl:     pantoprazole (ProtoNix) 40 mg EC tablet, Take 1 tablet (40 mg) by mouth once daily in the morning. Take before meals. 30 MINUTES PRIOR TO BREAKFAST, Disp: 90 tablet, Rfl: 1    traZODone (Desyrel) 50 mg tablet, Take 1 tablet (50 mg) by mouth as needed at bedtime for sleep., Disp: 90 tablet, Rfl: 1    Vascepa 1 gram capsule, Take 2 capsules (2 g) by mouth 2 times daily (morning and late afternoon)., Disp: 360 capsule, Rfl: 3  No current facility-administered medications for this encounter.     Physical Exam  Constitutional:       Appearance: Normal appearance.   HENT:      Head: Normocephalic and atraumatic.      Nose: Nose normal.      Mouth/Throat:      Mouth: Mucous membranes are moist.      Pharynx: Oropharynx is clear.   Eyes:      Extraocular Movements: Extraocular movements intact.      Conjunctiva/sclera: Conjunctivae normal.      Pupils: Pupils are equal, round, and reactive to light.   Cardiovascular:      Rate and Rhythm: Normal rate and regular rhythm.      Heart sounds: Normal heart sounds.   Pulmonary:      Breath sounds: Normal breath sounds.   Abdominal:      Palpations: Abdomen is soft.   Musculoskeletal:         General: Normal range of motion.      Cervical back: Normal range of motion and neck supple.   Lymphadenopathy:      Cervical: No cervical adenopathy.   Skin:     General: Skin is warm and dry.   Neurological:      General: No focal deficit present.      Mental Status: She is alert and oriented to person, place, and time.      Cranial Nerves: No cranial nerve deficit.      Sensory: No sensory deficit.      Motor: No weakness.      Coordination: Coordination normal.      Gait: Gait normal.   Psychiatric:         Mood and Affect: Mood normal.         Behavior: Behavior normal.         RESULTS:  MR brain w and wo IV contrast    Result Date: 10/16/2024  Interpreted By:  Celina Meza and Calo  Brigitte STUDY: MR BRAIN W AND WO IV CONTRAST;  10/16/2024 2:40 pm   INDICATION: Signs/Symptoms:Metastatic lung cancer to the brain s/p gamma knife SRS.  Surveillance imaging.  58-year-old female with history of lung cancer with solitary brain metastasis status post gamma knife radiation surgery in 2021.   ,C34.90 Malignant neoplasm of unspecified part of unspecified bronchus or lung (Multi),C79.31 Secondary malignant neoplasm of brain (Multi)   COMPARISON: MR brain 06/25/2024, 03/26/2024, 01/17/2024 and additional priors dating back to 01/05/2021.   ACCESSION NUMBER(S): [id number]   ORDERING CLINICIAN: MYNOR SHAFFER   TECHNIQUE: Axial T2, FLAIR, DWI, gradient echo T2 and sagittal and coronal T1 weighted images of brain were acquired. Post contrast T1 weighted images were acquired after administration of 16 mL Dotarem gadolinium based intravenous contrast.   FINDINGS: CSF Spaces: Diffuse prominence of the ventricles and sulci is noted. The basal cisterns are clear.   Parenchyma: There is no diffusion restriction abnormality to suggest acute infarct.  Similar 4-5 mm focal enhancement in the posteromedial right occipital lobe compared to 06/25/2024. Associated T2/FLAIR signal hyperintensity is region is also stable. There is again blooming artifact in this region compatible with hemosiderin deposition.   Similar focal enhancement in the right occipital bone measuring 6 mm is also similar compared to prior.   Subtle focal linear enhancement in the left ortega tg is stable compared to priors dating back to 01/05/2021, likely vascular in etiology.   No new focus of abnormal enhancement.   There is no mass effect or midline shift.   Multifocal T2 hyperintensities are again noted within the bilateral basal ganglia, likely representing prominent perivascular spaces.   Paranasal Sinuses and Mastoids: Mucosal thickening of the maxillary sinuses and ethmoid air cells bilaterally is noted. The remainder of the  visualized paranasal sinuses and mastoid air cells are unremarkable.       Stable foci of enhancement in the right occipital lobe and occipital bone compared to 06/25/2024, nonspecific and may reflect residual disease and/or posttreatment changes. Attention on follow-up is recommended. No new focal enhancement identified.   I personally reviewed the images/study and I agree with the findings as stated by Brigitte Guido MD. This study was interpreted at [facility], [place], [place].   MACRO: None   Signed by: Celina Meza 10/16/2024 3:54 PM Dictation workstation:   [id number]      ASSESSMENT:  58 y.o. 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 We reviewed her brain MRI today which again shows treatment changes.  Plan: MRI in 3-4 mo. FUV in rad onc after imaging.     Aviva Jeffrey CNP  [phone]

## 2024-10-23 ENCOUNTER — APPOINTMENT (OUTPATIENT)
Dept: HEMATOLOGY/ONCOLOGY | Facility: CLINIC | Age: 59
End: 2024-10-23
Payer: MEDICARE

## 2024-10-23 DIAGNOSIS — C34.90 MALIGNANT NEOPLASM OF LUNG, UNSPECIFIED LATERALITY, UNSPECIFIED PART OF LUNG (MULTI): ICD-10-CM

## 2024-10-23 DIAGNOSIS — I10 PRIMARY HYPERTENSION: ICD-10-CM

## 2024-10-25 ENCOUNTER — HOSPITAL ENCOUNTER (OUTPATIENT)
Dept: RADIOLOGY | Facility: CLINIC | Age: 59
Discharge: HOME | End: 2024-10-25
Payer: MEDICARE

## 2024-10-25 ENCOUNTER — LAB (OUTPATIENT)
Dept: LAB | Facility: LAB | Age: 59
End: 2024-10-25
Payer: COMMERCIAL

## 2024-10-25 DIAGNOSIS — C34.31 PRIMARY CANCER OF RIGHT LOWER LOBE OF LUNG (MULTI): ICD-10-CM

## 2024-10-25 DIAGNOSIS — C34.90 MALIGNANT NEOPLASM OF LUNG, UNSPECIFIED LATERALITY, UNSPECIFIED PART OF LUNG (MULTI): ICD-10-CM

## 2024-10-25 DIAGNOSIS — I10 PRIMARY HYPERTENSION: ICD-10-CM

## 2024-10-25 LAB
ALBUMIN SERPL BCP-MCNC: 3.9 G/DL (ref 3.4–5)
ALP SERPL-CCNC: 80 U/L (ref 33–110)
ALT SERPL W P-5'-P-CCNC: 34 U/L (ref 7–45)
ANION GAP SERPL CALC-SCNC: 13 MMOL/L (ref 10–20)
AST SERPL W P-5'-P-CCNC: 21 U/L (ref 9–39)
BASOPHILS # BLD AUTO: 0.03 X10*3/UL (ref 0–0.1)
BASOPHILS NFR BLD AUTO: 0.4 %
BILIRUB SERPL-MCNC: 0.6 MG/DL (ref 0–1.2)
BUN SERPL-MCNC: 12 MG/DL (ref 6–23)
CALCIUM SERPL-MCNC: 9.1 MG/DL (ref 8.6–10.3)
CHLORIDE SERPL-SCNC: 104 MMOL/L (ref 98–107)
CO2 SERPL-SCNC: 27 MMOL/L (ref 21–32)
CORTIS SERPL-MCNC: 0.6 UG/DL (ref 2.5–20)
CREAT SERPL-MCNC: 0.9 MG/DL (ref 0.5–1.05)
EGFRCR SERPLBLD CKD-EPI 2021: 74 ML/MIN/1.73M*2
EOSINOPHIL # BLD AUTO: 0.07 X10*3/UL (ref 0–0.7)
EOSINOPHIL NFR BLD AUTO: 0.9 %
ERYTHROCYTE [DISTWIDTH] IN BLOOD BY AUTOMATED COUNT: 12.7 % (ref 11.5–14.5)
GLUCOSE SERPL-MCNC: 85 MG/DL (ref 74–99)
HCT VFR BLD AUTO: 42.6 % (ref 36–46)
HGB BLD-MCNC: 13.8 G/DL (ref 12–16)
IMM GRANULOCYTES # BLD AUTO: 0.03 X10*3/UL (ref 0–0.7)
IMM GRANULOCYTES NFR BLD AUTO: 0.4 % (ref 0–0.9)
LYMPHOCYTES # BLD AUTO: 3.78 X10*3/UL (ref 1.2–4.8)
LYMPHOCYTES NFR BLD AUTO: 47.8 %
MAGNESIUM SERPL-MCNC: 1.98 MG/DL (ref 1.6–2.4)
MCH RBC QN AUTO: 30.1 PG (ref 26–34)
MCHC RBC AUTO-ENTMCNC: 32.4 G/DL (ref 32–36)
MCV RBC AUTO: 93 FL (ref 80–100)
MONOCYTES # BLD AUTO: 0.51 X10*3/UL (ref 0.1–1)
MONOCYTES NFR BLD AUTO: 6.5 %
NEUTROPHILS # BLD AUTO: 3.48 X10*3/UL (ref 1.2–7.7)
NEUTROPHILS NFR BLD AUTO: 44 %
NRBC BLD-RTO: 0 /100 WBCS (ref 0–0)
PLATELET # BLD AUTO: 251 X10*3/UL (ref 150–450)
POTASSIUM SERPL-SCNC: 4.5 MMOL/L (ref 3.5–5.3)
PROT SERPL-MCNC: 6.7 G/DL (ref 6.4–8.2)
RBC # BLD AUTO: 4.58 X10*6/UL (ref 4–5.2)
SODIUM SERPL-SCNC: 139 MMOL/L (ref 136–145)
T4 FREE SERPL-MCNC: 0.88 NG/DL (ref 0.61–1.12)
TSH SERPL-ACNC: 0.63 MIU/L (ref 0.44–3.98)
WBC # BLD AUTO: 7.9 X10*3/UL (ref 4.4–11.3)

## 2024-10-25 PROCEDURE — 2550000001 HC RX 255 CONTRASTS

## 2024-10-25 PROCEDURE — 83735 ASSAY OF MAGNESIUM: CPT

## 2024-10-25 PROCEDURE — 84439 ASSAY OF FREE THYROXINE: CPT

## 2024-10-25 PROCEDURE — 80053 COMPREHEN METABOLIC PANEL: CPT

## 2024-10-25 PROCEDURE — 85025 COMPLETE CBC W/AUTO DIFF WBC: CPT

## 2024-10-25 PROCEDURE — 71260 CT THORAX DX C+: CPT

## 2024-10-25 PROCEDURE — 74160 CT ABDOMEN W/CONTRAST: CPT

## 2024-10-25 PROCEDURE — 84443 ASSAY THYROID STIM HORMONE: CPT

## 2024-10-28 LAB — ACTH PLAS-MCNC: <1.5 PG/ML (ref 7.2–63.3)

## 2024-10-30 ENCOUNTER — OFFICE VISIT (OUTPATIENT)
Dept: HEMATOLOGY/ONCOLOGY | Facility: CLINIC | Age: 59
End: 2024-10-30
Payer: COMMERCIAL

## 2024-10-30 VITALS
BODY MASS INDEX: 29.7 KG/M2 | TEMPERATURE: 96.4 F | OXYGEN SATURATION: 97 % | RESPIRATION RATE: 18 BRPM | WEIGHT: 184 LBS | DIASTOLIC BLOOD PRESSURE: 87 MMHG | SYSTOLIC BLOOD PRESSURE: 136 MMHG | HEART RATE: 77 BPM

## 2024-10-30 DIAGNOSIS — C79.31 LUNG CANCER METASTATIC TO BRAIN (MULTI): Primary | ICD-10-CM

## 2024-10-30 DIAGNOSIS — C34.31 PRIMARY CANCER OF RIGHT LOWER LOBE OF LUNG (MULTI): ICD-10-CM

## 2024-10-30 DIAGNOSIS — C34.90 LUNG CANCER METASTATIC TO BRAIN (MULTI): Primary | ICD-10-CM

## 2024-10-30 PROCEDURE — 99214 OFFICE O/P EST MOD 30 MIN: CPT | Performed by: STUDENT IN AN ORGANIZED HEALTH CARE EDUCATION/TRAINING PROGRAM

## 2024-10-30 ASSESSMENT — PAIN SCALES - GENERAL: PAINLEVEL_OUTOF10: 0-NO PAIN

## 2024-11-01 LAB
CREAT SERPL-MCNC: 1 MG/DL (ref 0.6–1.3)
GFR SERPL CREATININE-BSD FRML MDRD: 65 ML/MIN/1.73M*2

## 2024-11-20 ENCOUNTER — LAB (OUTPATIENT)
Dept: LAB | Facility: LAB | Age: 59
End: 2024-11-20
Payer: COMMERCIAL

## 2024-11-20 DIAGNOSIS — E78.2 MIXED HYPERLIPIDEMIA: ICD-10-CM

## 2024-11-20 DIAGNOSIS — E11.65 TYPE 2 DIABETES MELLITUS WITH HYPERGLYCEMIA, WITHOUT LONG-TERM CURRENT USE OF INSULIN: ICD-10-CM

## 2024-11-20 DIAGNOSIS — E03.9 HYPOTHYROIDISM, UNSPECIFIED TYPE: ICD-10-CM

## 2024-11-20 DIAGNOSIS — E27.40 ADRENAL INSUFFICIENCY (MULTI): ICD-10-CM

## 2024-11-20 LAB
ALBUMIN SERPL BCP-MCNC: 3.8 G/DL (ref 3.4–5)
ANION GAP SERPL CALC-SCNC: 12 MMOL/L (ref 10–20)
BUN SERPL-MCNC: 17 MG/DL (ref 6–23)
C PEPTIDE SERPL-MCNC: 4.6 NG/ML (ref 0.7–3.9)
CALCIUM SERPL-MCNC: 9.2 MG/DL (ref 8.6–10.3)
CHLORIDE SERPL-SCNC: 103 MMOL/L (ref 98–107)
CHOLEST SERPL-MCNC: 163 MG/DL (ref 0–199)
CHOLESTEROL/HDL RATIO: 3.7
CO2 SERPL-SCNC: 29 MMOL/L (ref 21–32)
CREAT SERPL-MCNC: 1 MG/DL (ref 0.5–1.05)
EGFRCR SERPLBLD CKD-EPI 2021: 65 ML/MIN/1.73M*2
EST. AVERAGE GLUCOSE BLD GHB EST-MCNC: 137 MG/DL
GLUCOSE SERPL-MCNC: 105 MG/DL (ref 74–99)
HBA1C MFR BLD: 6.4 %
HDLC SERPL-MCNC: 44.6 MG/DL
LDLC SERPL CALC-MCNC: 62 MG/DL
NON HDL CHOLESTEROL: 118 MG/DL (ref 0–149)
PHOSPHATE SERPL-MCNC: 5 MG/DL (ref 2.5–4.9)
POTASSIUM SERPL-SCNC: 4.6 MMOL/L (ref 3.5–5.3)
SODIUM SERPL-SCNC: 139 MMOL/L (ref 136–145)
T4 FREE SERPL-MCNC: 1.03 NG/DL (ref 0.61–1.12)
TRIGL SERPL-MCNC: 283 MG/DL (ref 0–149)
TSH SERPL-ACNC: 1.3 MIU/L (ref 0.44–3.98)
VLDL: 57 MG/DL (ref 0–40)

## 2024-11-20 PROCEDURE — 36415 COLL VENOUS BLD VENIPUNCTURE: CPT

## 2024-12-12 ENCOUNTER — APPOINTMENT (OUTPATIENT)
Dept: ENDOCRINOLOGY | Facility: CLINIC | Age: 59
End: 2024-12-12
Payer: COMMERCIAL

## 2024-12-12 VITALS
DIASTOLIC BLOOD PRESSURE: 81 MMHG | WEIGHT: 186 LBS | SYSTOLIC BLOOD PRESSURE: 128 MMHG | HEART RATE: 73 BPM | TEMPERATURE: 97.4 F | BODY MASS INDEX: 30.02 KG/M2

## 2024-12-12 DIAGNOSIS — E27.40 ADRENAL INSUFFICIENCY (MULTI): ICD-10-CM

## 2024-12-12 DIAGNOSIS — E78.2 MIXED HYPERLIPIDEMIA: ICD-10-CM

## 2024-12-12 DIAGNOSIS — E11.65 TYPE 2 DIABETES MELLITUS WITH HYPERGLYCEMIA, WITHOUT LONG-TERM CURRENT USE OF INSULIN: ICD-10-CM

## 2024-12-12 DIAGNOSIS — E03.9 HYPOTHYROIDISM, UNSPECIFIED TYPE: ICD-10-CM

## 2024-12-12 PROCEDURE — 99214 OFFICE O/P EST MOD 30 MIN: CPT | Performed by: STUDENT IN AN ORGANIZED HEALTH CARE EDUCATION/TRAINING PROGRAM

## 2024-12-12 PROCEDURE — 3079F DIAST BP 80-89 MM HG: CPT | Performed by: STUDENT IN AN ORGANIZED HEALTH CARE EDUCATION/TRAINING PROGRAM

## 2024-12-12 PROCEDURE — 3044F HG A1C LEVEL LT 7.0%: CPT | Performed by: STUDENT IN AN ORGANIZED HEALTH CARE EDUCATION/TRAINING PROGRAM

## 2024-12-12 PROCEDURE — 3074F SYST BP LT 130 MM HG: CPT | Performed by: STUDENT IN AN ORGANIZED HEALTH CARE EDUCATION/TRAINING PROGRAM

## 2024-12-12 PROCEDURE — G2211 COMPLEX E/M VISIT ADD ON: HCPCS | Performed by: STUDENT IN AN ORGANIZED HEALTH CARE EDUCATION/TRAINING PROGRAM

## 2024-12-12 PROCEDURE — 3061F NEG MICROALBUMINURIA REV: CPT | Performed by: STUDENT IN AN ORGANIZED HEALTH CARE EDUCATION/TRAINING PROGRAM

## 2024-12-12 PROCEDURE — 3048F LDL-C <100 MG/DL: CPT | Performed by: STUDENT IN AN ORGANIZED HEALTH CARE EDUCATION/TRAINING PROGRAM

## 2024-12-12 RX ORDER — LEVOTHYROXINE SODIUM 137 UG/1
137 TABLET ORAL DAILY
Qty: 90 TABLET | Refills: 3 | Status: SHIPPED | OUTPATIENT
Start: 2024-12-12 | End: 2025-12-12

## 2024-12-12 RX ORDER — METHYLPREDNISOLONE SODIUM SUCCINATE 40 MG/ML
INJECTION INTRAMUSCULAR; INTRAVENOUS
Qty: 1 EACH | Refills: 3 | Status: SHIPPED | OUTPATIENT
Start: 2024-12-12

## 2024-12-12 RX ORDER — ICOSAPENT ETHYL 1000 MG/1
2 CAPSULE ORAL
Qty: 360 CAPSULE | Refills: 3 | Status: SHIPPED | OUTPATIENT
Start: 2024-12-12 | End: 2025-12-12

## 2024-12-12 RX ORDER — METFORMIN HYDROCHLORIDE 500 MG/1
500 TABLET ORAL
Qty: 180 TABLET | Refills: 1 | Status: SHIPPED | OUTPATIENT
Start: 2024-12-12 | End: 2025-06-10

## 2024-12-12 RX ORDER — BLOOD SUGAR DIAGNOSTIC
STRIP MISCELLANEOUS
Qty: 100 STRIP | Refills: 3 | Status: SHIPPED | OUTPATIENT
Start: 2024-12-12

## 2024-12-12 RX ORDER — HYDROCORTISONE 10 MG/1
TABLET ORAL
Qty: 350 TABLET | Refills: 3 | Status: SHIPPED | OUTPATIENT
Start: 2024-12-12
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
## 2024-12-12 NOTE — PATIENT INSTRUCTIONS
Trulicity 1.5mg weekly   Metformin 500mg twice a day with meals     Levothyroxine 137mcg daily     Continue your atorvastatin  Continue vascepa 2g twice a day    Get labs done March onwards, fasting from midnight       Follow up in 4-5 months    Libra Gillespie MD  Divison of Endocrinology   [facility]   Phone: [phone]    option 4, then option 1  Fax: [phone]

## 2024-12-17 DIAGNOSIS — E27.40 ADRENAL INSUFFICIENCY (MULTI): Primary | ICD-10-CM

## 2024-12-17 RX ORDER — DEXAMETHASONE SODIUM PHOSPHATE 10 MG/ML
4 INJECTION INTRAMUSCULAR; INTRAVENOUS ONCE AS NEEDED
Qty: 1 ML | Refills: 1 | Status: SHIPPED | OUTPATIENT
Start: 2024-12-17

## 2025-01-05 DIAGNOSIS — E53.8 VITAMIN B12 DEFICIENCY: ICD-10-CM

## 2025-01-07 RX ORDER — LANOLIN ALCOHOL/MO/W.PET/CERES
1000 CREAM (GRAM) TOPICAL DAILY
Qty: 90 TABLET | Refills: 0 | Status: SHIPPED | OUTPATIENT
Start: 2025-01-07

## 2025-01-10 ENCOUNTER — HOSPITAL ENCOUNTER (OUTPATIENT)
Dept: RADIOLOGY | Facility: CLINIC | Age: 60
Discharge: HOME | End: 2025-01-10
Payer: COMMERCIAL

## 2025-01-10 DIAGNOSIS — M25.551 RIGHT HIP PAIN: ICD-10-CM

## 2025-01-10 PROCEDURE — 73502 X-RAY EXAM HIP UNI 2-3 VIEWS: CPT | Mod: RT

## 2025-01-14 ENCOUNTER — APPOINTMENT (OUTPATIENT)
Dept: PRIMARY CARE | Facility: CLINIC | Age: 60
End: 2025-01-14
Payer: COMMERCIAL

## 2025-01-14 VITALS
TEMPERATURE: 97.4 F | BODY MASS INDEX: 29.92 KG/M2 | SYSTOLIC BLOOD PRESSURE: 118 MMHG | HEIGHT: 66 IN | WEIGHT: 186.2 LBS | HEART RATE: 79 BPM | DIASTOLIC BLOOD PRESSURE: 80 MMHG

## 2025-01-14 DIAGNOSIS — F41.9 ANXIETY: ICD-10-CM

## 2025-01-14 DIAGNOSIS — G45.8 SUBCLAVIAN STEAL SYNDROME: ICD-10-CM

## 2025-01-14 DIAGNOSIS — F32.A DEPRESSION, UNSPECIFIED DEPRESSION TYPE: ICD-10-CM

## 2025-01-14 DIAGNOSIS — K64.9 HEMORRHOIDS, UNSPECIFIED HEMORRHOID TYPE: ICD-10-CM

## 2025-01-14 DIAGNOSIS — Z12.12 ENCOUNTER FOR COLORECTAL CANCER SCREENING: ICD-10-CM

## 2025-01-14 DIAGNOSIS — R11.0 NAUSEA: ICD-10-CM

## 2025-01-14 DIAGNOSIS — K43.9 VENTRAL HERNIA WITHOUT OBSTRUCTION OR GANGRENE: Primary | ICD-10-CM

## 2025-01-14 DIAGNOSIS — Z12.11 ENCOUNTER FOR COLORECTAL CANCER SCREENING: ICD-10-CM

## 2025-01-14 DIAGNOSIS — K21.9 GASTROESOPHAGEAL REFLUX DISEASE, UNSPECIFIED WHETHER ESOPHAGITIS PRESENT: ICD-10-CM

## 2025-01-14 DIAGNOSIS — E78.1 HYPERTRIGLYCERIDEMIA: ICD-10-CM

## 2025-01-14 DIAGNOSIS — C34.90 ADENOCARCINOMA OF LUNG, UNSPECIFIED LATERALITY (MULTI): ICD-10-CM

## 2025-01-14 DIAGNOSIS — E55.9 VITAMIN D DEFICIENCY: ICD-10-CM

## 2025-01-14 PROCEDURE — 3079F DIAST BP 80-89 MM HG: CPT | Performed by: STUDENT IN AN ORGANIZED HEALTH CARE EDUCATION/TRAINING PROGRAM

## 2025-01-14 PROCEDURE — 3074F SYST BP LT 130 MM HG: CPT | Performed by: STUDENT IN AN ORGANIZED HEALTH CARE EDUCATION/TRAINING PROGRAM

## 2025-01-14 PROCEDURE — 3008F BODY MASS INDEX DOCD: CPT | Performed by: STUDENT IN AN ORGANIZED HEALTH CARE EDUCATION/TRAINING PROGRAM

## 2025-01-14 PROCEDURE — 99214 OFFICE O/P EST MOD 30 MIN: CPT | Performed by: STUDENT IN AN ORGANIZED HEALTH CARE EDUCATION/TRAINING PROGRAM

## 2025-01-14 RX ORDER — ESCITALOPRAM OXALATE 5 MG/1
5 TABLET ORAL DAILY
Qty: 90 TABLET | Refills: 1 | Status: SHIPPED | OUTPATIENT
Start: 2025-01-14 | End: 2025-07-13

## 2025-01-14 RX ORDER — LIDOCAINE HYDROCHLORIDE AND HYDROCORTISONE ACETATE 30; 5 MG/G; MG/G
1 CREAM TOPICAL 2 TIMES DAILY
Qty: 28.3 G | Refills: 0 | Status: SHIPPED | OUTPATIENT
Start: 2025-01-14

## 2025-01-14 RX ORDER — CLOPIDOGREL BISULFATE 75 MG/1
75 TABLET ORAL DAILY
Qty: 90 TABLET | Refills: 1 | Status: SHIPPED | OUTPATIENT
Start: 2025-01-14

## 2025-01-14 RX ORDER — ATORVASTATIN CALCIUM 20 MG/1
20 TABLET, FILM COATED ORAL DAILY
Qty: 90 TABLET | Refills: 1 | Status: SHIPPED | OUTPATIENT
Start: 2025-01-14 | End: 2025-07-13

## 2025-01-14 RX ORDER — ONDANSETRON 4 MG/1
4 TABLET, ORALLY DISINTEGRATING ORAL EVERY 8 HOURS PRN
Qty: 30 TABLET | Refills: 0 | Status: SHIPPED | OUTPATIENT
Start: 2025-01-14

## 2025-01-14 RX ORDER — PANTOPRAZOLE SODIUM 40 MG/1
40 TABLET, DELAYED RELEASE ORAL
Qty: 90 TABLET | Refills: 1 | Status: SHIPPED | OUTPATIENT
Start: 2025-01-14 | End: 2025-07-13

## 2025-01-14 RX ORDER — ERGOCALCIFEROL 1.25 MG/1
50000 CAPSULE ORAL
Qty: 12 CAPSULE | Refills: 1 | Status: SHIPPED | OUTPATIENT
Start: 2025-01-19 | End: 2025-07-06

## 2025-01-14 NOTE — PATIENT INSTRUCTIONS
I went ahead and submitted a referral for general surgery for you for the hernia repair as well as to address the hemorrhoids.  I also put in for a medication that you can use topically.    I also submitted an order for gastroenterologist through .  Please let me know within the next 1 to 2 weeks if you do not hear from their office about getting set up for an appointment as you should be receiving a call from [facility].  You could also get it set up through your Personal Web Systemshart.  This is both for screening colonoscopy as well as upper endoscopy for the gastric reflux without previous upper endoscopy.    Also ahead and resubmitted the orders for your prescriptions as well.    Please get set up for a wellness examination with me in June.  This appointment can be set up before you leave today.    Please call back if you are having any issues getting into see a general surgeon.

## 2025-01-14 NOTE — PROGRESS NOTES
"Subjective   Patient ID: Betsy Sams is a 59 y.o. female who presents for Abdominal Pain, Hernia, and Hemorrhoids.    HPI     She has been having more indigestion especially at night.  This has also been causing nausea.  Overall it has only been about once a week.  She does still have the hernia and never saw general surgery previously to have this addressed and think she is ready to do that at this time.  She does have a chronic history of hemorrhoids as well and is interested in getting a cream.  She would also like to discuss this with the surgeon.  She is due for her colonoscopy and she is interested in getting a referral as well as to make sure that there is not some other cause of her symptoms.      Review of Systems   Constitutional:  Negative for chills, fatigue and fever.   HENT:  Negative for congestion.    Respiratory:  Negative for cough and shortness of breath.    Gastrointestinal:  Positive for abdominal pain, diarrhea and nausea. Negative for constipation and vomiting.       Objective   /80   Pulse 79   Temp 36.3 °C (97.4 °F)   Ht 1.676 m (5' 6\")   Wt 84.5 kg (186 lb 3.2 oz)   BMI 30.05 kg/m²     Physical Exam  Vitals and nursing note reviewed.   Constitutional:       General: She is not in acute distress.     Appearance: Normal appearance. She is normal weight. She is not ill-appearing or toxic-appearing.   HENT:      Head: Normocephalic and atraumatic.   Cardiovascular:      Rate and Rhythm: Normal rate and regular rhythm.      Heart sounds: Normal heart sounds.   Pulmonary:      Effort: Pulmonary effort is normal.      Breath sounds: Normal breath sounds.   Abdominal:      General: Abdomen is flat. Bowel sounds are normal.      Palpations: Abdomen is soft.   Neurological:      Mental Status: She is alert.         Assessment/Plan   Problem List Items Addressed This Visit             ICD-10-CM    Anxiety F41.9    Relevant Medications    escitalopram (Lexapro) 5 mg tablet    Depression " F32.A    Relevant Medications    escitalopram (Lexapro) 5 mg tablet    GERD (gastroesophageal reflux disease) K21.9     Chronic.  Stable. On pantoprazole.         Relevant Medications    pantoprazole (ProtoNix) 40 mg EC tablet    Other Relevant Orders    Referral to Gastroenterology    Hypertriglyceridemia E78.1    Relevant Medications    atorvastatin (Lipitor) 20 mg tablet    Subclavian steal syndrome G45.8    Relevant Medications    clopidogrel (Plavix) 75 mg tablet    Ventral hernia without obstruction or gangrene - Primary K43.9     Referral placed to general surgery.         Relevant Orders    Referral to General Surgery    Vitamin D deficiency E55.9    Relevant Medications    ergocalciferol (Vitamin D-2) 1.25 MG (90809 UT) capsule (Start on 1/19/2025)    Hemorrhoids K64.9    Relevant Medications    lidocaine HCl-hydrocortison ac (Radiaura) 3-0.5 % cream cream    Other Relevant Orders    Referral to General Surgery     Other Visit Diagnoses         Codes    Nausea     R11.0    Relevant Medications    ondansetron ODT (Zofran-ODT) 4 mg disintegrating tablet    Encounter for colorectal cancer screening     Z12.11, Z12.12    Relevant Orders    Referral to Gastroenterology          History and physical examination as above.  Patient presenting for multiple gastrointestinal complaints.  Referral placed to general surgery to discuss repair of the abdominal hernia as well as to address the hemorrhoids.  Submitted for topical medication for the hemorrhoids at this time.  Went ahead and submitted orders for gastroenterology through [facility].  She will call back if she does not hear from them.  Refilled patient's Lexapro, pantoprazole and sent in a refill of Zofran medication to help with her nausea.  She will come back for a wellness examination in June.

## 2025-01-17 ENCOUNTER — APPOINTMENT (OUTPATIENT)
Dept: PRIMARY CARE | Facility: CLINIC | Age: 60
End: 2025-01-17
Payer: COMMERCIAL

## 2025-01-23 ASSESSMENT — ENCOUNTER SYMPTOMS
FEVER: 0
COUGH: 0
SHORTNESS OF BREATH: 0
DIARRHEA: 1
ABDOMINAL PAIN: 1
FATIGUE: 0
CONSTIPATION: 0
NAUSEA: 1
VOMITING: 0
CHILLS: 0

## 2025-01-28 ENCOUNTER — SOCIAL WORK (OUTPATIENT)
Dept: CASE MANAGEMENT | Facility: HOSPITAL | Age: 60
End: 2025-01-28
Payer: COMMERCIAL

## 2025-01-28 NOTE — PROGRESS NOTES
SW received a letter from \[facility]\""t S today re: patient's approval for Medicaid from 4/2025 to 3/31/25. SW called patient today to see if she received the same information. Patient reported she had not. SW emailed her a copy of the letter. Letter also saved in Epic.

## 2025-02-14 ENCOUNTER — APPOINTMENT (OUTPATIENT)
Dept: RADIATION ONCOLOGY | Facility: CLINIC | Age: 60
End: 2025-02-14
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
## 2025-02-18 ENCOUNTER — PREP FOR PROCEDURE (OUTPATIENT)
Dept: SURGERY | Facility: CLINIC | Age: 60
End: 2025-02-18

## 2025-02-18 ENCOUNTER — APPOINTMENT (OUTPATIENT)
Dept: SURGERY | Facility: CLINIC | Age: 60
End: 2025-02-18
Payer: COMMERCIAL

## 2025-02-18 VITALS
OXYGEN SATURATION: 96 % | HEART RATE: 80 BPM | RESPIRATION RATE: 18 BRPM | DIASTOLIC BLOOD PRESSURE: 83 MMHG | HEIGHT: 66 IN | BODY MASS INDEX: 29.25 KG/M2 | WEIGHT: 182 LBS | SYSTOLIC BLOOD PRESSURE: 122 MMHG | TEMPERATURE: 97.2 F

## 2025-02-18 DIAGNOSIS — K43.0 INCARCERATED INCISIONAL HERNIA: Primary | ICD-10-CM

## 2025-02-18 DIAGNOSIS — Z01.818 PREOP TESTING: ICD-10-CM

## 2025-02-18 DIAGNOSIS — K43.9 VENTRAL HERNIA WITHOUT OBSTRUCTION OR GANGRENE: ICD-10-CM

## 2025-02-18 DIAGNOSIS — K64.9 HEMORRHOIDS, UNSPECIFIED HEMORRHOID TYPE: ICD-10-CM

## 2025-02-18 LAB
ANION GAP SERPL CALCULATED.4IONS-SCNC: 11 MMOL/L (CALC) (ref 7–17)
BUN SERPL-MCNC: 16 MG/DL (ref 7–25)
BUN/CREAT SERPL: NORMAL (CALC) (ref 6–22)
CALCIUM SERPL-MCNC: 9.6 MG/DL (ref 8.6–10.4)
CHLORIDE SERPL-SCNC: 105 MMOL/L (ref 98–110)
CO2 SERPL-SCNC: 24 MMOL/L (ref 20–32)
CREAT SERPL-MCNC: 1 MG/DL (ref 0.5–1.05)
EGFRCR SERPLBLD CKD-EPI 2021: 64 ML/MIN/1.73M2
ERYTHROCYTE [DISTWIDTH] IN BLOOD BY AUTOMATED COUNT: 12.4 % (ref 11–15)
GLUCOSE SERPL-MCNC: 93 MG/DL (ref 65–99)
HCT VFR BLD AUTO: 41 % (ref 35–45)
HGB BLD-MCNC: 13.6 G/DL (ref 11.7–15.5)
MCH RBC QN AUTO: 30.5 PG (ref 27–33)
MCHC RBC AUTO-ENTMCNC: 33.2 G/DL (ref 32–36)
MCV RBC AUTO: 91.9 FL (ref 80–100)
PLATELET # BLD AUTO: 250 THOUSAND/UL (ref 140–400)
PMV BLD REES-ECKER: 10.8 FL (ref 7.5–12.5)
POTASSIUM SERPL-SCNC: 4.3 MMOL/L (ref 3.5–5.3)
RBC # BLD AUTO: 4.46 MILLION/UL (ref 3.8–5.1)
SODIUM SERPL-SCNC: 140 MMOL/L (ref 135–146)
WBC # BLD AUTO: 7.7 THOUSAND/UL (ref 3.8–10.8)

## 2025-02-18 PROCEDURE — 3074F SYST BP LT 130 MM HG: CPT | Performed by: SURGERY

## 2025-02-18 PROCEDURE — 99205 OFFICE O/P NEW HI 60 MIN: CPT | Performed by: SURGERY

## 2025-02-18 PROCEDURE — 3008F BODY MASS INDEX DOCD: CPT | Performed by: SURGERY

## 2025-02-18 PROCEDURE — 3079F DIAST BP 80-89 MM HG: CPT | Performed by: SURGERY

## 2025-02-18 RX ORDER — OXYCODONE HYDROCHLORIDE 5 MG/1
5 TABLET ORAL EVERY 6 HOURS PRN
Qty: 16 TABLET | Refills: 0 | Status: SHIPPED | OUTPATIENT
Start: 2025-02-18 | End: 2025-02-22

## 2025-02-18 RX ORDER — DEXAMETHASONE SODIUM PHOSPHATE 4 MG/ML
INJECTION, SOLUTION INTRA-ARTICULAR; INTRALESIONAL; INTRAMUSCULAR; INTRAVENOUS; SOFT TISSUE
COMMUNITY
Start: 2024-06-22

## 2025-02-18 RX ORDER — SODIUM CHLORIDE, SODIUM LACTATE, POTASSIUM CHLORIDE, CALCIUM CHLORIDE 600; 310; 30; 20 MG/100ML; MG/100ML; MG/100ML; MG/100ML
20 INJECTION, SOLUTION INTRAVENOUS CONTINUOUS
OUTPATIENT
Start: 2025-02-18 | End: 2025-02-19

## 2025-02-18 RX ORDER — GUAIFENESIN AND PSEUDOEPHEDRINE HYDROCHLORIDE 600; 60 MG/1; MG/1
TABLET, EXTENDED RELEASE ORAL
COMMUNITY
Start: 2024-09-12

## 2025-02-18 RX ORDER — GABAPENTIN 300 MG/1
300 CAPSULE ORAL 3 TIMES DAILY
Qty: 30 CAPSULE | Refills: 0 | Status: SHIPPED | OUTPATIENT
Start: 2025-02-18 | End: 2025-02-28

## 2025-02-19 ENCOUNTER — HOSPITAL ENCOUNTER (OUTPATIENT)
Dept: RADIOLOGY | Facility: CLINIC | Age: 60
Discharge: HOME | End: 2025-02-19
Payer: COMMERCIAL

## 2025-02-19 DIAGNOSIS — C34.90 LUNG CANCER METASTATIC TO BRAIN (MULTI): ICD-10-CM

## 2025-02-19 DIAGNOSIS — C79.31 LUNG CANCER METASTATIC TO BRAIN (MULTI): ICD-10-CM

## 2025-02-19 PROCEDURE — 70553 MRI BRAIN STEM W/O & W/DYE: CPT

## 2025-02-19 PROCEDURE — 2550000001 HC RX 255 CONTRASTS: Performed by: NURSE PRACTITIONER

## 2025-02-19 PROCEDURE — 93010 ELECTROCARDIOGRAM REPORT: CPT | Performed by: INTERNAL MEDICINE

## 2025-02-19 PROCEDURE — 93005 ELECTROCARDIOGRAM TRACING: CPT

## 2025-02-19 PROCEDURE — A9575 INJ GADOTERATE MEGLUMI 0.1ML: HCPCS | Performed by: NURSE PRACTITIONER

## 2025-02-19 RX ORDER — GADOTERATE MEGLUMINE 376.9 MG/ML
16 INJECTION INTRAVENOUS
Status: COMPLETED | OUTPATIENT
Start: 2025-02-19 | End: 2025-02-19

## 2025-02-19 RX ADMIN — GADOTERATE MEGLUMINE 16 ML: 376.9 INJECTION INTRAVENOUS at 13:28

## 2025-02-21 ENCOUNTER — HOSPITAL ENCOUNTER (OUTPATIENT)
Dept: RADIATION ONCOLOGY | Facility: CLINIC | Age: 60
Setting detail: RADIATION/ONCOLOGY SERIES
Discharge: HOME | End: 2025-02-21
Payer: COMMERCIAL

## 2025-02-21 DIAGNOSIS — C34.90 ADENOCARCINOMA OF LUNG, UNSPECIFIED LATERALITY (MULTI): Primary | ICD-10-CM

## 2025-02-21 DIAGNOSIS — R19.8 IRREGULAR BOWEL HABITS: ICD-10-CM

## 2025-02-21 DIAGNOSIS — C79.31 METASTATIC CANCER TO BRAIN (MULTI): ICD-10-CM

## 2025-02-21 DIAGNOSIS — C34.90 LUNG CANCER METASTATIC TO BRAIN (MULTI): ICD-10-CM

## 2025-02-21 DIAGNOSIS — C79.31 LUNG CANCER METASTATIC TO BRAIN (MULTI): ICD-10-CM

## 2025-02-21 PROCEDURE — 99213 OFFICE O/P EST LOW 20 MIN: CPT | Performed by: NURSE PRACTITIONER

## 2025-02-21 PROCEDURE — 99213 OFFICE O/P EST LOW 20 MIN: CPT | Mod: 95 | Performed by: NURSE PRACTITIONER

## 2025-02-21 NOTE — PROGRESS NOTES
Radiation Oncology Follow-Up    Patient Name:  Betsy Sams  MRN:  [id number]  :  1965    Referring Provider: Kate Jeffrey APR*  Primary Care Provider: Bahman Montiel DO  Care Team: Patient Care Team:  Bahman Montiel DO as PCP - General (Family Medicine)  Michelle Owen MD as Consulting Physician (Hematology and Oncology)  MELISSA Whitley as  ()  Tom Magana MD as Consulting Physician (Hematology and Oncology)  Jorge Alberto Rodriguez MD as Surgeon (Vascular Surgery)    Date of Service: 2025   Virtual or Telephone Consent    While technically available, the patient was unable or unwilling to consent to connect via audio/video telehealth technology; therefore, I performed this visit using a real-time audio only connection between Betsy Sams location: & SUHAS Cook-CNP location:   Verbal consent was requested and obtained from Betsy Sams on this date, 25 for a telehealth visit.    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 She was treated with GK SRS on 2021 to a right occipital lesion consisting of a dose of 20 Gy.     Single agent pembrolizumab under EA 5163 initiated 03/10/21.    SUBJECTIVE  History of Present Illness:   Telehealth visit with Betsy today. She says she is doing well and completed 2 yrs of  immunotherapy in 2023 and continues responding to treatment. Continues surveillance imaging and FU with Med Onc.  She says she feels well and managing all of her usual ADLs. Works out at the gym routinely. She denies headaches, seizures or any recent falls.  No cough, SOB, chest pain, GI complaints or bony pain. Depression seems to be managed well with current medications.     Review of Systems:    Review of Systems   All other systems reviewed and  "are negative.    Performance Status:   The Karnofsky performance scale today is 100, Fully active, able to carry on all pre-disease performed without restriction (ECOG equivalent 0).      OBJECTIVE    Current Outpatient Medications:     albuterol (Ventolin HFA) 90 mcg/actuation inhaler, Inhale 2 puffs every 4 hours if needed for wheezing or shortness of breath., Disp: 18 g, Rfl: 1    alcohol swabs pads, medicated, use as directed once daily, Disp: , Rfl:     aspirin 81 mg chewable tablet, Chew 1 tablet (81 mg) once daily., Disp: , Rfl:     atorvastatin (Lipitor) 20 mg tablet, Take 1 tablet (20 mg) by mouth once daily., Disp: 90 tablet, Rfl: 1    BD Luer-Haily Syringe 3 mL 25 gauge x 1\" syringe, use as directed, Disp: , Rfl:     clopidogrel (Plavix) 75 mg tablet, Take 1 tablet (75 mg) by mouth once daily., Disp: 90 tablet, Rfl: 1    cyanocobalamin (Vitamin B-12) 1,000 mcg tablet, TAKE ONE TABLET BY MOUTH EVERY DAY, Disp: 90 tablet, Rfl: 0    dexAMETHasone 4 mg/mL injection, inject 1 MILLILITER (4 MG) FOR 1 DOSE IN CASE OF EMERGENCY, Disp: , Rfl:     dexAMETHasone, PF, (Decadron) 10 mg/mL injection, Inject 0.4 mL (4 mg) as directed 1 time if needed (once as needed if unable to take PO hydrocortisone then go to the ED) for up to 1 dose., Disp: 1 mL, Rfl: 1    dulaglutide (Trulicity) 1.5 mg/0.5 mL pen injector injection, Inject 1.5 mg under the skin 1 (one) time per week., Disp: 4 each, Rfl: 11    ergocalciferol (Vitamin D-2) 1.25 MG (63022 UT) capsule, Take 1 capsule (50,000 Units) by mouth 1 (one) time per week., Disp: 12 capsule, Rfl: 1    escitalopram (Lexapro) 5 mg tablet, Take 1 tablet (5 mg) by mouth once daily., Disp: 90 tablet, Rfl: 1    famotidine (Pepcid) 20 mg tablet, Take 1 tablet (20 mg) by mouth 2 times a day., Disp: 60 tablet, Rfl: 5    fluticasone (Flonase) 50 mcg/actuation nasal spray, Administer 1 spray into each nostril once daily. Shake gently. Before first use, prime pump. After use, clean tip and " replace cap., Disp: 16 g, Rfl: 0    gabapentin (Neurontin) 300 mg capsule, Take 1 capsule (300 mg) by mouth 3 times a day for 10 days., Disp: 30 capsule, Rfl: 0    hydrocortisone (Cortef) 10 mg tablet, 20mg in the morning and 10mg in the afternoon, double the dose during sick days, Disp: 350 tablet, Rfl: 3    ipratropium (Atrovent) 21 mcg (0.03 %) nasal spray, Administer 2 sprays into each nostril. 2-3 times daily, Disp: , Rfl:     levothyroxine (Synthroid, Levoxyl) 137 mcg tablet, Take 1 tablet (137 mcg) by mouth early in the morning.., Disp: 90 tablet, Rfl: 3    lidocaine HCl-hydrocortison ac (Radiaura) 3-0.5 % cream cream, Apply 1 Application topically 2 times a day., Disp: 28.3 g, Rfl: 0    metFORMIN (Glucophage) 500 mg tablet, Take 1 tablet (500 mg) by mouth 2 times daily (morning and late afternoon). Take with food., Disp: 180 tablet, Rfl: 1    methylPREDNISolone sodium succinate (SOLU-Medrol) 40 mg injection, 40mg as needed for emergency if unable to tolerate PO hydrocortisone then go to the ED, Disp: 1 each, Rfl: 3    Microlet Lancet misc, use 1 LANCET to TEST BLOOD SUGAR once daily as directed, Disp: , Rfl:     Mucinex D  mg 12 hr tablet, TAKE ONE TABLET BY MOUTH EVERY 12 HOURS FOR 10 DAYS.  DO NOT CRUSH  CHEW  OR SPLIT., Disp: , Rfl:     ondansetron ODT (Zofran-ODT) 4 mg disintegrating tablet, Dissolve 1 tablet (4 mg) in the mouth every 8 hours if needed for nausea or vomiting., Disp: 30 tablet, Rfl: 0    OneTouch Ultra Test strip, Bg check daily, Disp: 100 strip, Rfl: 3    oxyCODONE (Roxicodone) 5 mg immediate release tablet, Take 1 tablet (5 mg) by mouth every 6 hours if needed for severe pain (7 - 10) for up to 4 days., Disp: 16 tablet, Rfl: 0    pantoprazole (ProtoNix) 40 mg EC tablet, Take 1 tablet (40 mg) by mouth once daily in the morning. Take before meals. 30 MINUTES PRIOR TO BREAKFAST, Disp: 90 tablet, Rfl: 1    traZODone (Desyrel) 50 mg tablet, Take 1 tablet (50 mg) by mouth as needed at  bedtime for sleep., Disp: 90 tablet, Rfl: 1    Vascepa 1 gram capsule, Take 2 capsules (2 g) by mouth 2 times daily (morning and late afternoon)., Disp: 360 capsule, Rfl: 3     Physical Exam  Constitutional:       Appearance: Normal appearance.   HENT:      Head: Normocephalic and atraumatic.      Nose: Nose normal.      Mouth/Throat:      Mouth: Mucous membranes are moist.      Pharynx: Oropharynx is clear.   Eyes:      Extraocular Movements: Extraocular movements intact.      Conjunctiva/sclera: Conjunctivae normal.      Pupils: Pupils are equal, round, and reactive to light.   Cardiovascular:      Rate and Rhythm: Normal rate and regular rhythm.      Heart sounds: Normal heart sounds.   Pulmonary:      Breath sounds: Normal breath sounds.   Abdominal:      Palpations: Abdomen is soft.   Musculoskeletal:         General: Normal range of motion.      Cervical back: Normal range of motion and neck supple.   Lymphadenopathy:      Cervical: No cervical adenopathy.   Skin:     General: Skin is warm and dry.   Neurological:      General: No focal deficit present.      Mental Status: She is alert and oriented to person, place, and time.      Cranial Nerves: No cranial nerve deficit.      Sensory: No sensory deficit.      Motor: No weakness.      Coordination: Coordination normal.      Gait: Gait normal.   Psychiatric:         Mood and Affect: Mood normal.         Behavior: Behavior normal.        RESULTS:  MR brain w and wo IV contrast    Result Date: 2/19/2025  Interpreted By:  Sherie Andrew, STUDY: MR BRAIN W AND WO IV CONTRAST;  2/19/2025 1:42 pm   INDICATION: Signs/Symptoms:Metastatic lung cancer to the brain s/p gamma knife. Surveillance imaging.   ,C34.90 Malignant neoplasm of unspecified part of unspecified bronchus or lung (Multi),C79.31 Secondary malignant neoplasm of brain (Multi)   COMPARISON: 10/16/2024   ACCESSION NUMBER(S): [id number]   ORDERING CLINICIAN: MYNOR SHAFFER   TECHNIQUE: Axial T2, FLAIR,  DWI, gradient echo T2 and sagittal and coronal T1 weighted images of brain were acquired. Post contrast T1 weighted images were acquired after administration of 16 ML Dotarem gadolinium based intravenous contrast.   FINDINGS: Similar 0.4 cm focal enhancement within the posteromedial right occipital lobe with associated T2 and FLAIR signal abnormality and susceptibility artifact (image 95 series 8).   Similar 0.6 cm focus of enhancement within the right occipital bone (image 93 series 8).   Similar linear enhancement within the left ortega tg which may be vascular.   No new focus of enhancement.   CSF Spaces: The ventricles, sulci and basal cisterns are within normal limits. There is no extra-axial fluid collection.   Parenchyma: There is no diffusion restriction abnormality to suggest acute infarct.  There is no new focal parenchymal signal abnormality. There is no mass effect or midline shift. Cerebellar tonsils are at the level of the foramen magnum. Pituitary and sella are not enlarged. No new abnormal susceptibility artifact.   Paranasal Sinuses and Mastoids: Visualized paranasal sinuses and mastoid air cells are predominantly clear. Major intracranial flow voids at the skull base are unremarkable..       Unchanged foci of enhancement within the right occipital lobe and right occipital bone compared to the prior exam 10/16/2024. No new focal enhancement.   MACRO: None   Signed by: Sherie Andrew 2/19/2025 3:18 PM Dictation workstation:   [id number]      ASSESSMENT:  59 y.o. 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 We reviewed her brain MRI today which again shows treatment changes.  Plan: MRI in 6 mo. FUV in rad onc after imaging.     Aviva Jeffrey CNP  [phone]

## 2025-02-25 ENCOUNTER — APPOINTMENT (OUTPATIENT)
Dept: SURGERY | Facility: CLINIC | Age: 60
End: 2025-02-25
Payer: COMMERCIAL

## 2025-03-05 ENCOUNTER — TELEPHONE (OUTPATIENT)
Dept: SURGERY | Facility: CLINIC | Age: 60
End: 2025-03-05
Payer: COMMERCIAL

## 2025-03-05 NOTE — TELEPHONE ENCOUNTER
Pt is aware to stop Trulicity 7days before surgery. She takes on Saturdays and states she will not take it on 3/8/2025.

## 2025-03-07 ENCOUNTER — OFFICE VISIT (OUTPATIENT)
Dept: PRIMARY CARE | Facility: CLINIC | Age: 60
End: 2025-03-07
Payer: COMMERCIAL

## 2025-03-07 VITALS
WEIGHT: 179 LBS | HEART RATE: 79 BPM | DIASTOLIC BLOOD PRESSURE: 75 MMHG | OXYGEN SATURATION: 97 % | SYSTOLIC BLOOD PRESSURE: 113 MMHG | BODY MASS INDEX: 28.89 KG/M2 | TEMPERATURE: 97.2 F

## 2025-03-07 DIAGNOSIS — G45.8 SUBCLAVIAN STEAL SYNDROME: ICD-10-CM

## 2025-03-07 DIAGNOSIS — Z01.818 PRE-OPERATIVE CLEARANCE: Primary | ICD-10-CM

## 2025-03-07 DIAGNOSIS — C79.31 LUNG CANCER METASTATIC TO BRAIN (MULTI): ICD-10-CM

## 2025-03-07 DIAGNOSIS — K43.9 VENTRAL HERNIA WITHOUT OBSTRUCTION OR GANGRENE: ICD-10-CM

## 2025-03-07 DIAGNOSIS — C34.90 ADENOCARCINOMA OF LUNG, UNSPECIFIED LATERALITY (MULTI): ICD-10-CM

## 2025-03-07 DIAGNOSIS — C34.90 LUNG CANCER METASTATIC TO BRAIN (MULTI): ICD-10-CM

## 2025-03-07 PROCEDURE — 3074F SYST BP LT 130 MM HG: CPT | Performed by: STUDENT IN AN ORGANIZED HEALTH CARE EDUCATION/TRAINING PROGRAM

## 2025-03-07 PROCEDURE — 99213 OFFICE O/P EST LOW 20 MIN: CPT | Performed by: STUDENT IN AN ORGANIZED HEALTH CARE EDUCATION/TRAINING PROGRAM

## 2025-03-07 PROCEDURE — 3078F DIAST BP <80 MM HG: CPT | Performed by: STUDENT IN AN ORGANIZED HEALTH CARE EDUCATION/TRAINING PROGRAM

## 2025-03-07 ASSESSMENT — ENCOUNTER SYMPTOMS
CONSTIPATION: 0
FEVER: 0
NAUSEA: 0
VOMITING: 0
COUGH: 0
CHILLS: 0
ABDOMINAL PAIN: 0
FATIGUE: 0
DIARRHEA: 0
SHORTNESS OF BREATH: 0

## 2025-03-07 NOTE — PROGRESS NOTES
Subjective   Patient ID: Betsy Sams is a 59 y.o. female who presents for Pre-op Exam (Hernia, Dr. Seo, 3/14/2025).    HPI     Overall doing well.  Having a ventral hernia repair on 3/14/2025.  No issues in the past with anesthesia.  No family history of reactions to anesthesia.  No chest pain or shortness of breath.    She has gone without her plavix in the past with previous procedures.  This was last in 2020.  She has also called her vascular surgeon to confirm.    Review of Systems   Constitutional:  Negative for chills, fatigue and fever.   HENT:  Negative for congestion.    Respiratory:  Negative for cough and shortness of breath.    Cardiovascular:  Negative for chest pain.   Gastrointestinal:  Negative for abdominal pain, constipation, diarrhea, nausea and vomiting.       Objective   /75 (BP Location: Right arm)   Pulse 79   Temp 36.2 °C (97.2 °F)   Wt 81.2 kg (179 lb)   SpO2 97%   BMI 28.89 kg/m²     Physical Exam  Vitals and nursing note reviewed.   Constitutional:       General: She is not in acute distress.     Appearance: Normal appearance. She is normal weight. She is not ill-appearing or toxic-appearing.   HENT:      Head: Normocephalic and atraumatic.   Cardiovascular:      Rate and Rhythm: Normal rate and regular rhythm.      Heart sounds: Normal heart sounds.   Pulmonary:      Effort: Pulmonary effort is normal.      Breath sounds: Normal breath sounds.   Neurological:      Mental Status: She is alert.         Assessment/Plan   Problem List Items Addressed This Visit             ICD-10-CM    Adenocarcinoma, lung (Multi) C34.90    Lung cancer metastatic to brain (Multi) C34.90, C79.31    Subclavian steal syndrome G45.8    Ventral hernia without obstruction or gangrene K43.9     Other Visit Diagnoses         Codes    Pre-operative clearance    -  Primary Z01.818                preoperative clearance for a hernia repair.  This is scheduled with Dr. Seo with general surgery with [facility].  The surgery date is planned for 3/14/2025.  Patient has had surgery in the past without complications.  She is overall done well with anesthesia and has not had any issues.  No family history of reactions to anesthesia.  She denies any current symptoms at this time.  She has been on Plavix for some time and does follow with vascular surgery.  She is gone without Plavix for 5 days prior to surgery in the past without issues.  This last time was in 2020.  She has a call out to her vascular surgeon to confirm that they are aware that she would need to go off of this and to make sure that everything with that is okay.    Reviewed results of PAT testing.  Patient would be acceptable level of risk for moderate risk surgical procedure with laparoscopic repair of incarcerated incisional hernia with mesh under general anesthesia.  Patient has been optimized.  She should be able to stop her Plavix 5 days prior to her surgery.    Patient will continue with regular follow-up with her vascular surgeon as well as for other regular wellness care.  She will come in sooner for other acute concerns or complaints.

## 2025-03-10 ENCOUNTER — TELEPHONE (OUTPATIENT)
Dept: VASCULAR SURGERY | Facility: HOSPITAL | Age: 60
End: 2025-03-10

## 2025-03-13 ENCOUNTER — ANESTHESIA EVENT (OUTPATIENT)
Dept: OPERATING ROOM | Facility: HOSPITAL | Age: 60
End: 2025-03-13
Payer: COMMERCIAL

## 2025-03-14 ENCOUNTER — HOSPITAL ENCOUNTER (OUTPATIENT)
Facility: HOSPITAL | Age: 60
Discharge: HOME | End: 2025-03-15
Attending: SURGERY | Admitting: SURGERY
Payer: COMMERCIAL

## 2025-03-14 ENCOUNTER — ANESTHESIA (OUTPATIENT)
Dept: OPERATING ROOM | Facility: HOSPITAL | Age: 60
End: 2025-03-14
Payer: COMMERCIAL

## 2025-03-14 ENCOUNTER — APPOINTMENT (OUTPATIENT)
Facility: CLINIC | Age: 60
End: 2025-03-14
Payer: COMMERCIAL

## 2025-03-14 DIAGNOSIS — K43.2 INCISIONAL HERNIA WITHOUT OBSTRUCTION OR GANGRENE: ICD-10-CM

## 2025-03-14 DIAGNOSIS — K43.0 INCARCERATED INCISIONAL HERNIA: Primary | ICD-10-CM

## 2025-03-14 LAB
GLUCOSE BLD MANUAL STRIP-MCNC: 105 MG/DL (ref 74–99)
GLUCOSE BLD MANUAL STRIP-MCNC: 124 MG/DL (ref 74–99)
GLUCOSE BLD MANUAL STRIP-MCNC: 158 MG/DL (ref 74–99)
GLUCOSE BLD MANUAL STRIP-MCNC: 181 MG/DL (ref 74–99)

## 2025-03-14 PROCEDURE — 3700000001 HC GENERAL ANESTHESIA TIME - INITIAL BASE CHARGE: Performed by: SURGERY

## 2025-03-14 PROCEDURE — 3600000009 HC OR TIME - EACH INCREMENTAL 1 MINUTE - PROCEDURE LEVEL FOUR: Performed by: SURGERY

## 2025-03-14 PROCEDURE — 96372 THER/PROPH/DIAG INJ SC/IM: CPT | Mod: 59

## 2025-03-14 PROCEDURE — 2500000005 HC RX 250 GENERAL PHARMACY W/O HCPCS: Performed by: ANESTHESIOLOGY

## 2025-03-14 PROCEDURE — 2500000004 HC RX 250 GENERAL PHARMACY W/ HCPCS (ALT 636 FOR OP/ED)

## 2025-03-14 PROCEDURE — 2500000004 HC RX 250 GENERAL PHARMACY W/ HCPCS (ALT 636 FOR OP/ED): Performed by: ANESTHESIOLOGY

## 2025-03-14 PROCEDURE — 3600000004 HC OR TIME - INITIAL BASE CHARGE - PROCEDURE LEVEL FOUR: Performed by: SURGERY

## 2025-03-14 PROCEDURE — 7100000001 HC RECOVERY ROOM TIME - INITIAL BASE CHARGE: Performed by: SURGERY

## 2025-03-14 PROCEDURE — 2720000007 HC OR 272 NO HCPCS: Performed by: SURGERY

## 2025-03-14 PROCEDURE — 2500000005 HC RX 250 GENERAL PHARMACY W/O HCPCS: Performed by: SURGERY

## 2025-03-14 PROCEDURE — 2500000001 HC RX 250 WO HCPCS SELF ADMINISTERED DRUGS (ALT 637 FOR MEDICARE OP)

## 2025-03-14 PROCEDURE — 7100000002 HC RECOVERY ROOM TIME - EACH INCREMENTAL 1 MINUTE: Performed by: SURGERY

## 2025-03-14 PROCEDURE — 44603 SUTURE SMALL INTESTINE: CPT | Performed by: SURGERY

## 2025-03-14 PROCEDURE — 7100000011 HC EXTENDED STAY RECOVERY HOURLY - NURSING UNIT

## 2025-03-14 PROCEDURE — 3700000002 HC GENERAL ANESTHESIA TIME - EACH INCREMENTAL 1 MINUTE: Performed by: SURGERY

## 2025-03-14 PROCEDURE — 2500000004 HC RX 250 GENERAL PHARMACY W/ HCPCS (ALT 636 FOR OP/ED): Performed by: SURGERY

## 2025-03-14 PROCEDURE — 49594 RPR AA HRN 1ST 3-10 NCR/STRN: CPT | Performed by: SURGERY

## 2025-03-14 PROCEDURE — 82947 ASSAY GLUCOSE BLOOD QUANT: CPT

## 2025-03-14 PROCEDURE — 2780000003 HC OR 278 NO HCPCS: Performed by: SURGERY

## 2025-03-14 PROCEDURE — C1781 MESH (IMPLANTABLE): HCPCS | Performed by: SURGERY

## 2025-03-14 DEVICE — BARD SOFT MESH, 4" X 6" (10 CM X 15 CM)
Type: IMPLANTABLE DEVICE | Site: ABDOMEN | Status: FUNCTIONAL
Brand: BARD

## 2025-03-14 RX ORDER — PANTOPRAZOLE SODIUM 40 MG/1
40 TABLET, DELAYED RELEASE ORAL
Status: DISCONTINUED | OUTPATIENT
Start: 2025-03-15 | End: 2025-03-15 | Stop reason: HOSPADM

## 2025-03-14 RX ORDER — FENTANYL CITRATE 50 UG/ML
INJECTION, SOLUTION INTRAMUSCULAR; INTRAVENOUS AS NEEDED
Status: DISCONTINUED | OUTPATIENT
Start: 2025-03-14 | End: 2025-03-14

## 2025-03-14 RX ORDER — INSULIN LISPRO 100 [IU]/ML
0-5 INJECTION, SOLUTION INTRAVENOUS; SUBCUTANEOUS
Status: DISCONTINUED | OUTPATIENT
Start: 2025-03-14 | End: 2025-03-15 | Stop reason: HOSPADM

## 2025-03-14 RX ORDER — SODIUM CHLORIDE, SODIUM LACTATE, POTASSIUM CHLORIDE, CALCIUM CHLORIDE 600; 310; 30; 20 MG/100ML; MG/100ML; MG/100ML; MG/100ML
100 INJECTION, SOLUTION INTRAVENOUS CONTINUOUS
Status: DISCONTINUED | OUTPATIENT
Start: 2025-03-14 | End: 2025-03-14

## 2025-03-14 RX ORDER — HYDROMORPHONE HYDROCHLORIDE 1 MG/ML
1 INJECTION, SOLUTION INTRAMUSCULAR; INTRAVENOUS; SUBCUTANEOUS
Status: DISCONTINUED | OUTPATIENT
Start: 2025-03-14 | End: 2025-03-14

## 2025-03-14 RX ORDER — KETOROLAC TROMETHAMINE 30 MG/ML
15 INJECTION, SOLUTION INTRAMUSCULAR; INTRAVENOUS EVERY 8 HOURS SCHEDULED
Status: DISCONTINUED | OUTPATIENT
Start: 2025-03-14 | End: 2025-03-15 | Stop reason: HOSPADM

## 2025-03-14 RX ORDER — ONDANSETRON 4 MG/1
4 TABLET, ORALLY DISINTEGRATING ORAL EVERY 8 HOURS PRN
Status: DISCONTINUED | OUTPATIENT
Start: 2025-03-14 | End: 2025-03-14

## 2025-03-14 RX ORDER — HYDROCORTISONE 10 MG/1
20 TABLET ORAL EVERY MORNING
Status: DISCONTINUED | OUTPATIENT
Start: 2025-03-15 | End: 2025-03-15 | Stop reason: HOSPADM

## 2025-03-14 RX ORDER — OXYCODONE HYDROCHLORIDE 5 MG/1
5 TABLET ORAL EVERY 4 HOURS PRN
Status: DISCONTINUED | OUTPATIENT
Start: 2025-03-14 | End: 2025-03-15 | Stop reason: HOSPADM

## 2025-03-14 RX ORDER — HYDROMORPHONE HYDROCHLORIDE 1 MG/ML
1 INJECTION, SOLUTION INTRAMUSCULAR; INTRAVENOUS; SUBCUTANEOUS EVERY 5 MIN PRN
Status: DISCONTINUED | OUTPATIENT
Start: 2025-03-14 | End: 2025-03-14 | Stop reason: HOSPADM

## 2025-03-14 RX ORDER — LIDOCAINE HCL/PF 100 MG/5ML
SYRINGE (ML) INTRAVENOUS AS NEEDED
Status: DISCONTINUED | OUTPATIENT
Start: 2025-03-14 | End: 2025-03-14

## 2025-03-14 RX ORDER — FLUTICASONE PROPIONATE 50 MCG
1 SPRAY, SUSPENSION (ML) NASAL DAILY
Status: DISCONTINUED | OUTPATIENT
Start: 2025-03-14 | End: 2025-03-15 | Stop reason: HOSPADM

## 2025-03-14 RX ORDER — AMOXICILLIN 250 MG
2 CAPSULE ORAL NIGHTLY PRN
Status: DISCONTINUED | OUTPATIENT
Start: 2025-03-14 | End: 2025-03-15 | Stop reason: HOSPADM

## 2025-03-14 RX ORDER — KETOROLAC TROMETHAMINE 30 MG/ML
INJECTION, SOLUTION INTRAMUSCULAR; INTRAVENOUS AS NEEDED
Status: DISCONTINUED | OUTPATIENT
Start: 2025-03-14 | End: 2025-03-14

## 2025-03-14 RX ORDER — HYDRALAZINE HYDROCHLORIDE 20 MG/ML
10 INJECTION INTRAMUSCULAR; INTRAVENOUS EVERY 30 MIN PRN
Status: DISCONTINUED | OUTPATIENT
Start: 2025-03-14 | End: 2025-03-14 | Stop reason: HOSPADM

## 2025-03-14 RX ORDER — HYDROMORPHONE HYDROCHLORIDE 0.2 MG/ML
0.2 INJECTION INTRAMUSCULAR; INTRAVENOUS; SUBCUTANEOUS
Status: DISCONTINUED | OUTPATIENT
Start: 2025-03-14 | End: 2025-03-15 | Stop reason: HOSPADM

## 2025-03-14 RX ORDER — HYDROCORTISONE 10 MG/1
10 TABLET ORAL NIGHTLY
Status: DISCONTINUED | OUTPATIENT
Start: 2025-03-14 | End: 2025-03-15 | Stop reason: HOSPADM

## 2025-03-14 RX ORDER — DEXTROSE 50 % IN WATER (D50W) INTRAVENOUS SYRINGE
25
Status: DISCONTINUED | OUTPATIENT
Start: 2025-03-14 | End: 2025-03-15 | Stop reason: HOSPADM

## 2025-03-14 RX ORDER — NAPROXEN SODIUM 220 MG/1
81 TABLET, FILM COATED ORAL DAILY
Status: DISCONTINUED | OUTPATIENT
Start: 2025-03-15 | End: 2025-03-15 | Stop reason: HOSPADM

## 2025-03-14 RX ORDER — LABETALOL HYDROCHLORIDE 5 MG/ML
10 INJECTION, SOLUTION INTRAVENOUS ONCE AS NEEDED
Status: DISCONTINUED | OUTPATIENT
Start: 2025-03-14 | End: 2025-03-14 | Stop reason: HOSPADM

## 2025-03-14 RX ORDER — MEPERIDINE HYDROCHLORIDE 50 MG/ML
12.5 INJECTION INTRAMUSCULAR; INTRAVENOUS; SUBCUTANEOUS EVERY 10 MIN PRN
Status: DISCONTINUED | OUTPATIENT
Start: 2025-03-14 | End: 2025-03-14 | Stop reason: HOSPADM

## 2025-03-14 RX ORDER — PHENYLEPHRINE HCL IN 0.9% NACL 1 MG/10 ML
SYRINGE (ML) INTRAVENOUS AS NEEDED
Status: DISCONTINUED | OUTPATIENT
Start: 2025-03-14 | End: 2025-03-14

## 2025-03-14 RX ORDER — MIDAZOLAM HYDROCHLORIDE 1 MG/ML
INJECTION, SOLUTION INTRAMUSCULAR; INTRAVENOUS AS NEEDED
Status: DISCONTINUED | OUTPATIENT
Start: 2025-03-14 | End: 2025-03-14

## 2025-03-14 RX ORDER — SODIUM CHLORIDE 0.9 G/100ML
INJECTION, SOLUTION IRRIGATION AS NEEDED
Status: DISCONTINUED | OUTPATIENT
Start: 2025-03-14 | End: 2025-03-14 | Stop reason: HOSPADM

## 2025-03-14 RX ORDER — ACETAMINOPHEN 325 MG/1
650 TABLET ORAL EVERY 4 HOURS PRN
Status: DISCONTINUED | OUTPATIENT
Start: 2025-03-14 | End: 2025-03-14

## 2025-03-14 RX ORDER — NORETHINDRONE AND ETHINYL ESTRADIOL 0.5-0.035
KIT ORAL AS NEEDED
Status: DISCONTINUED | OUTPATIENT
Start: 2025-03-14 | End: 2025-03-14

## 2025-03-14 RX ORDER — ESCITALOPRAM OXALATE 10 MG/1
5 TABLET ORAL DAILY
Status: DISCONTINUED | OUTPATIENT
Start: 2025-03-14 | End: 2025-03-15 | Stop reason: HOSPADM

## 2025-03-14 RX ORDER — DIPHENHYDRAMINE HYDROCHLORIDE 50 MG/ML
12.5 INJECTION, SOLUTION INTRAMUSCULAR; INTRAVENOUS ONCE AS NEEDED
Status: DISCONTINUED | OUTPATIENT
Start: 2025-03-14 | End: 2025-03-14 | Stop reason: HOSPADM

## 2025-03-14 RX ORDER — ACETAMINOPHEN 325 MG/1
975 TABLET ORAL EVERY 8 HOURS
Status: DISCONTINUED | OUTPATIENT
Start: 2025-03-14 | End: 2025-03-15 | Stop reason: HOSPADM

## 2025-03-14 RX ORDER — TRAZODONE HYDROCHLORIDE 50 MG/1
50 TABLET ORAL NIGHTLY PRN
Status: DISCONTINUED | OUTPATIENT
Start: 2025-03-14 | End: 2025-03-15 | Stop reason: HOSPADM

## 2025-03-14 RX ORDER — CLOPIDOGREL BISULFATE 75 MG/1
75 TABLET ORAL DAILY
Status: DISCONTINUED | OUTPATIENT
Start: 2025-03-14 | End: 2025-03-15 | Stop reason: HOSPADM

## 2025-03-14 RX ORDER — HEPARIN SODIUM 5000 [USP'U]/ML
5000 INJECTION, SOLUTION INTRAVENOUS; SUBCUTANEOUS EVERY 8 HOURS
Status: DISCONTINUED | OUTPATIENT
Start: 2025-03-14 | End: 2025-03-15 | Stop reason: HOSPADM

## 2025-03-14 RX ORDER — SODIUM CHLORIDE, SODIUM LACTATE, POTASSIUM CHLORIDE, CALCIUM CHLORIDE 600; 310; 30; 20 MG/100ML; MG/100ML; MG/100ML; MG/100ML
20 INJECTION, SOLUTION INTRAVENOUS CONTINUOUS
Status: DISCONTINUED | OUTPATIENT
Start: 2025-03-14 | End: 2025-03-14

## 2025-03-14 RX ORDER — PROPOFOL 10 MG/ML
INJECTION, EMULSION INTRAVENOUS AS NEEDED
Status: DISCONTINUED | OUTPATIENT
Start: 2025-03-14 | End: 2025-03-14

## 2025-03-14 RX ORDER — NALOXONE HYDROCHLORIDE 1 MG/ML
0.2 INJECTION INTRAMUSCULAR; INTRAVENOUS; SUBCUTANEOUS EVERY 5 MIN PRN
Status: DISCONTINUED | OUTPATIENT
Start: 2025-03-14 | End: 2025-03-14

## 2025-03-14 RX ORDER — ROCURONIUM BROMIDE 10 MG/ML
INJECTION, SOLUTION INTRAVENOUS AS NEEDED
Status: DISCONTINUED | OUTPATIENT
Start: 2025-03-14 | End: 2025-03-14

## 2025-03-14 RX ORDER — GABAPENTIN 300 MG/1
300 CAPSULE ORAL 3 TIMES DAILY
Status: DISCONTINUED | OUTPATIENT
Start: 2025-03-14 | End: 2025-03-15 | Stop reason: HOSPADM

## 2025-03-14 RX ORDER — DEXTROSE 50 % IN WATER (D50W) INTRAVENOUS SYRINGE
12.5
Status: DISCONTINUED | OUTPATIENT
Start: 2025-03-14 | End: 2025-03-15 | Stop reason: HOSPADM

## 2025-03-14 RX ORDER — ATORVASTATIN CALCIUM 20 MG/1
20 TABLET, FILM COATED ORAL DAILY
Status: DISCONTINUED | OUTPATIENT
Start: 2025-03-14 | End: 2025-03-15 | Stop reason: HOSPADM

## 2025-03-14 RX ORDER — ONDANSETRON HYDROCHLORIDE 2 MG/ML
4 INJECTION, SOLUTION INTRAVENOUS EVERY 8 HOURS PRN
Status: DISCONTINUED | OUTPATIENT
Start: 2025-03-14 | End: 2025-03-15 | Stop reason: HOSPADM

## 2025-03-14 RX ORDER — ONDANSETRON HYDROCHLORIDE 2 MG/ML
INJECTION, SOLUTION INTRAVENOUS AS NEEDED
Status: DISCONTINUED | OUTPATIENT
Start: 2025-03-14 | End: 2025-03-14

## 2025-03-14 RX ORDER — ONDANSETRON HYDROCHLORIDE 2 MG/ML
4 INJECTION, SOLUTION INTRAVENOUS ONCE AS NEEDED
Status: DISCONTINUED | OUTPATIENT
Start: 2025-03-14 | End: 2025-03-14 | Stop reason: HOSPADM

## 2025-03-14 RX ADMIN — PROPOFOL 50 MG: 10 INJECTION, EMULSION INTRAVENOUS at 07:54

## 2025-03-14 RX ADMIN — LIDOCAINE HYDROCHLORIDE 100 MG: 20 INJECTION INTRAVENOUS at 07:51

## 2025-03-14 RX ADMIN — SUGAMMADEX 200 MG: 100 INJECTION, SOLUTION INTRAVENOUS at 10:17

## 2025-03-14 RX ADMIN — PROPOFOL 150 MG: 10 INJECTION, EMULSION INTRAVENOUS at 07:51

## 2025-03-14 RX ADMIN — ACETAMINOPHEN 975 MG: 325 TABLET, FILM COATED ORAL at 21:13

## 2025-03-14 RX ADMIN — HEPARIN SODIUM 5000 UNITS: 5000 INJECTION, SOLUTION INTRAVENOUS; SUBCUTANEOUS at 21:13

## 2025-03-14 RX ADMIN — ESCITALOPRAM OXALATE 5 MG: 10 TABLET ORAL at 14:06

## 2025-03-14 RX ADMIN — FENTANYL CITRATE 25 MCG: 50 INJECTION, SOLUTION INTRAMUSCULAR; INTRAVENOUS at 10:02

## 2025-03-14 RX ADMIN — FENTANYL CITRATE 25 MCG: 50 INJECTION, SOLUTION INTRAMUSCULAR; INTRAVENOUS at 10:31

## 2025-03-14 RX ADMIN — FENTANYL CITRATE 100 MCG: 50 INJECTION, SOLUTION INTRAMUSCULAR; INTRAVENOUS at 07:51

## 2025-03-14 RX ADMIN — ROCURONIUM BROMIDE 50 MG: 50 INJECTION, SOLUTION INTRAVENOUS at 07:52

## 2025-03-14 RX ADMIN — FENTANYL CITRATE 50 MCG: 50 INJECTION, SOLUTION INTRAMUSCULAR; INTRAVENOUS at 08:48

## 2025-03-14 RX ADMIN — HYDROCORTISONE SODIUM SUCCINATE 100 MG: 100 INJECTION, POWDER, FOR SOLUTION INTRAMUSCULAR; INTRAVENOUS at 07:52

## 2025-03-14 RX ADMIN — Medication 100 MCG: at 09:04

## 2025-03-14 RX ADMIN — EPHEDRINE SULFATE 5 MG: 50 INJECTION, SOLUTION INTRAVENOUS at 09:04

## 2025-03-14 RX ADMIN — SODIUM CHLORIDE, POTASSIUM CHLORIDE, SODIUM LACTATE AND CALCIUM CHLORIDE: 600; 310; 30; 20 INJECTION, SOLUTION INTRAVENOUS at 07:45

## 2025-03-14 RX ADMIN — ACETAMINOPHEN 975 MG: 325 TABLET, FILM COATED ORAL at 14:06

## 2025-03-14 RX ADMIN — Medication 100 MCG: at 08:02

## 2025-03-14 RX ADMIN — ATORVASTATIN CALCIUM 20 MG: 20 TABLET, FILM COATED ORAL at 14:06

## 2025-03-14 RX ADMIN — GABAPENTIN 300 MG: 300 CAPSULE ORAL at 15:28

## 2025-03-14 RX ADMIN — KETOROLAC TROMETHAMINE 15 MG: 30 INJECTION, SOLUTION INTRAMUSCULAR at 22:18

## 2025-03-14 RX ADMIN — KETOROLAC TROMETHAMINE 15 MG: 30 INJECTION, SOLUTION INTRAMUSCULAR at 14:44

## 2025-03-14 RX ADMIN — ONDANSETRON 4 MG: 2 INJECTION INTRAMUSCULAR; INTRAVENOUS at 10:02

## 2025-03-14 RX ADMIN — HYDROMORPHONE HYDROCHLORIDE 1 MG: 1 INJECTION, SOLUTION INTRAMUSCULAR; INTRAVENOUS; SUBCUTANEOUS at 11:07

## 2025-03-14 RX ADMIN — HYDROCORTISONE 10 MG: 10 TABLET ORAL at 21:13

## 2025-03-14 RX ADMIN — MIDAZOLAM 2 MG: 1 INJECTION INTRAMUSCULAR; INTRAVENOUS at 07:50

## 2025-03-14 RX ADMIN — GABAPENTIN 300 MG: 300 CAPSULE ORAL at 21:13

## 2025-03-14 RX ADMIN — HYDROMORPHONE HYDROCHLORIDE 1 MG: 1 INJECTION, SOLUTION INTRAMUSCULAR; INTRAVENOUS; SUBCUTANEOUS at 10:49

## 2025-03-14 RX ADMIN — SODIUM CHLORIDE, POTASSIUM CHLORIDE, SODIUM LACTATE AND CALCIUM CHLORIDE 100 ML/HR: 600; 310; 30; 20 INJECTION, SOLUTION INTRAVENOUS at 11:35

## 2025-03-14 RX ADMIN — Medication 100 MCG: at 08:05

## 2025-03-14 RX ADMIN — Medication 6 L/MIN: at 10:29

## 2025-03-14 RX ADMIN — EPHEDRINE SULFATE 5 MG: 50 INJECTION, SOLUTION INTRAVENOUS at 09:02

## 2025-03-14 RX ADMIN — Medication 100 MCG: at 08:34

## 2025-03-14 RX ADMIN — KETOROLAC TROMETHAMINE 30 MG: 30 INJECTION, SOLUTION INTRAMUSCULAR at 10:03

## 2025-03-14 RX ADMIN — ROCURONIUM BROMIDE 20 MG: 50 INJECTION, SOLUTION INTRAVENOUS at 08:48

## 2025-03-14 RX ADMIN — Medication 100 MCG: at 08:55

## 2025-03-14 SDOH — SOCIAL STABILITY: SOCIAL INSECURITY: ARE THERE ANY APPARENT SIGNS OF INJURIES/BEHAVIORS THAT COULD BE RELATED TO ABUSE/NEGLECT?: NO

## 2025-03-14 SDOH — ECONOMIC STABILITY: FOOD INSECURITY: WITHIN THE PAST 12 MONTHS, YOU WORRIED THAT YOUR FOOD WOULD RUN OUT BEFORE YOU GOT THE MONEY TO BUY MORE.: NEVER TRUE

## 2025-03-14 SDOH — ECONOMIC STABILITY: TRANSPORTATION INSECURITY: IN THE PAST 12 MONTHS, HAS LACK OF TRANSPORTATION KEPT YOU FROM MEDICAL APPOINTMENTS OR FROM GETTING MEDICATIONS?: NO

## 2025-03-14 SDOH — ECONOMIC STABILITY: FOOD INSECURITY: HOW HARD IS IT FOR YOU TO PAY FOR THE VERY BASICS LIKE FOOD, HOUSING, MEDICAL CARE, AND HEATING?: NOT VERY HARD

## 2025-03-14 SDOH — SOCIAL STABILITY: SOCIAL INSECURITY
WITHIN THE LAST YEAR, HAVE YOU BEEN KICKED, HIT, SLAPPED, OR OTHERWISE PHYSICALLY HURT BY YOUR PARTNER OR EX-PARTNER?: NO

## 2025-03-14 SDOH — SOCIAL STABILITY: SOCIAL INSECURITY
WITHIN THE LAST YEAR, HAVE YOU BEEN RAPED OR FORCED TO HAVE ANY KIND OF SEXUAL ACTIVITY BY YOUR PARTNER OR EX-PARTNER?: NO

## 2025-03-14 SDOH — SOCIAL STABILITY: SOCIAL INSECURITY: HAS ANYONE EVER THREATENED TO HURT YOUR FAMILY OR YOUR PETS?: NO

## 2025-03-14 SDOH — SOCIAL STABILITY: SOCIAL INSECURITY: WERE YOU ABLE TO COMPLETE ALL THE BEHAVIORAL HEALTH SCREENINGS?: YES

## 2025-03-14 SDOH — ECONOMIC STABILITY: HOUSING INSECURITY: AT ANY TIME IN THE PAST 12 MONTHS, WERE YOU HOMELESS OR LIVING IN A SHELTER (INCLUDING NOW)?: NO

## 2025-03-14 SDOH — SOCIAL STABILITY: SOCIAL INSECURITY: WITHIN THE LAST YEAR, HAVE YOU BEEN HUMILIATED OR EMOTIONALLY ABUSED IN OTHER WAYS BY YOUR PARTNER OR EX-PARTNER?: NO

## 2025-03-14 SDOH — SOCIAL STABILITY: SOCIAL INSECURITY: WITHIN THE LAST YEAR, HAVE YOU BEEN AFRAID OF YOUR PARTNER OR EX-PARTNER?: NO

## 2025-03-14 SDOH — SOCIAL STABILITY: SOCIAL INSECURITY: ABUSE: ADULT

## 2025-03-14 SDOH — HEALTH STABILITY: MENTAL HEALTH: CURRENT SMOKER: 0

## 2025-03-14 SDOH — ECONOMIC STABILITY: FOOD INSECURITY: WITHIN THE PAST 12 MONTHS, THE FOOD YOU BOUGHT JUST DIDN'T LAST AND YOU DIDN'T HAVE MONEY TO GET MORE.: NEVER TRUE

## 2025-03-14 SDOH — ECONOMIC STABILITY: INCOME INSECURITY: IN THE PAST 12 MONTHS HAS THE ELECTRIC, GAS, OIL, OR WATER COMPANY THREATENED TO SHUT OFF SERVICES IN YOUR HOME?: NO

## 2025-03-14 SDOH — ECONOMIC STABILITY: HOUSING INSECURITY: IN THE LAST 12 MONTHS, WAS THERE A TIME WHEN YOU WERE NOT ABLE TO PAY THE MORTGAGE OR RENT ON TIME?: NO

## 2025-03-14 SDOH — SOCIAL STABILITY: SOCIAL INSECURITY: DO YOU FEEL UNSAFE GOING BACK TO THE PLACE WHERE YOU ARE LIVING?: NO

## 2025-03-14 SDOH — SOCIAL STABILITY: SOCIAL INSECURITY: DOES ANYONE TRY TO KEEP YOU FROM HAVING/CONTACTING OTHER FRIENDS OR DOING THINGS OUTSIDE YOUR HOME?: NO

## 2025-03-14 SDOH — SOCIAL STABILITY: SOCIAL INSECURITY
ASK PARENT OR GUARDIAN: ARE THERE TIMES WHEN YOU, YOUR CHILD(REN), OR ANY MEMBER OF YOUR HOUSEHOLD FEEL UNSAFE, HARMED, OR THREATENED AROUND PERSONS WITH WHOM YOU KNOW OR LIVE?: NO

## 2025-03-14 SDOH — SOCIAL STABILITY: SOCIAL INSECURITY: HAVE YOU HAD ANY THOUGHTS OF HARMING ANYONE ELSE?: NO

## 2025-03-14 SDOH — ECONOMIC STABILITY: HOUSING INSECURITY: IN THE PAST 12 MONTHS, HOW MANY TIMES HAVE YOU MOVED WHERE YOU WERE LIVING?: 1

## 2025-03-14 SDOH — SOCIAL STABILITY: SOCIAL INSECURITY: DO YOU FEEL ANYONE HAS EXPLOITED OR TAKEN ADVANTAGE OF YOU FINANCIALLY OR OF YOUR PERSONAL PROPERTY?: NO

## 2025-03-14 SDOH — SOCIAL STABILITY: SOCIAL INSECURITY: HAVE YOU HAD THOUGHTS OF HARMING ANYONE ELSE?: NO

## 2025-03-14 SDOH — ECONOMIC STABILITY: HOUSING INSECURITY: DO YOU FEEL UNSAFE GOING BACK TO THE PLACE WHERE YOU LIVE?: NO

## 2025-03-14 SDOH — SOCIAL STABILITY: SOCIAL INSECURITY: ARE YOU OR HAVE YOU BEEN THREATENED OR ABUSED PHYSICALLY, EMOTIONALLY, OR SEXUALLY BY ANYONE?: NO

## 2025-03-14 ASSESSMENT — PAIN - FUNCTIONAL ASSESSMENT
PAIN_FUNCTIONAL_ASSESSMENT: 0-10

## 2025-03-14 ASSESSMENT — PAIN SCALES - GENERAL
PAINLEVEL_OUTOF10: 7
PAINLEVEL_OUTOF10: 3
PAINLEVEL_OUTOF10: 4
PAINLEVEL_OUTOF10: 0 - NO PAIN
PAINLEVEL_OUTOF10: 0 - NO PAIN
PAINLEVEL_OUTOF10: 5 - MODERATE PAIN
PAINLEVEL_OUTOF10: 6
PAINLEVEL_OUTOF10: 8
PAINLEVEL_OUTOF10: 7
PAINLEVEL_OUTOF10: 8
PAINLEVEL_OUTOF10: 8
PAIN_LEVEL: 6

## 2025-03-14 ASSESSMENT — ACTIVITIES OF DAILY LIVING (ADL)
PATIENT'S MEMORY ADEQUATE TO SAFELY COMPLETE DAILY ACTIVITIES?: YES
HEARING - LEFT EAR: FUNCTIONAL
ADEQUATE_TO_COMPLETE_ADL: YES
GROOMING: INDEPENDENT
WALKS IN HOME: INDEPENDENT
FEEDING YOURSELF: INDEPENDENT
LACK_OF_TRANSPORTATION: NO
TOILETING: INDEPENDENT
HEARING - RIGHT EAR: FUNCTIONAL
LACK_OF_TRANSPORTATION: NO
BATHING: INDEPENDENT
DRESSING YOURSELF: INDEPENDENT
JUDGMENT_ADEQUATE_SAFELY_COMPLETE_DAILY_ACTIVITIES: YES

## 2025-03-14 ASSESSMENT — COLUMBIA-SUICIDE SEVERITY RATING SCALE - C-SSRS
2. HAVE YOU ACTUALLY HAD ANY THOUGHTS OF KILLING YOURSELF?: NO
6. HAVE YOU EVER DONE ANYTHING, STARTED TO DO ANYTHING, OR PREPARED TO DO ANYTHING TO END YOUR LIFE?: NO
6. HAVE YOU EVER DONE ANYTHING, STARTED TO DO ANYTHING, OR PREPARED TO DO ANYTHING TO END YOUR LIFE?: NO
1. IN THE PAST MONTH, HAVE YOU WISHED YOU WERE DEAD OR WISHED YOU COULD GO TO SLEEP AND NOT WAKE UP?: NO
2. HAVE YOU ACTUALLY HAD ANY THOUGHTS OF KILLING YOURSELF?: NO
1. IN THE PAST MONTH, HAVE YOU WISHED YOU WERE DEAD OR WISHED YOU COULD GO TO SLEEP AND NOT WAKE UP?: NO

## 2025-03-14 ASSESSMENT — COGNITIVE AND FUNCTIONAL STATUS - GENERAL
STANDING UP FROM CHAIR USING ARMS: A LITTLE
DAILY ACTIVITIY SCORE: 20
HELP NEEDED FOR BATHING: A LITTLE
MOBILITY SCORE: 19
MOVING TO AND FROM BED TO CHAIR: A LITTLE
PATIENT BASELINE BEDBOUND: NO
CLIMB 3 TO 5 STEPS WITH RAILING: A LITTLE
DRESSING REGULAR LOWER BODY CLOTHING: A LITTLE
WALKING IN HOSPITAL ROOM: A LITTLE
TOILETING: A LITTLE
TURNING FROM BACK TO SIDE WHILE IN FLAT BAD: A LITTLE
DRESSING REGULAR UPPER BODY CLOTHING: A LITTLE

## 2025-03-14 ASSESSMENT — LIFESTYLE VARIABLES
PRESCIPTION_ABUSE_PAST_12_MONTHS: NO
HOW OFTEN DO YOU HAVE A DRINK CONTAINING ALCOHOL: NEVER
SUBSTANCE_ABUSE_PAST_12_MONTHS: NO
SKIP TO QUESTIONS 9-10: 1
AUDIT-C TOTAL SCORE: 0
AUDIT-C TOTAL SCORE: 0
HOW OFTEN DO YOU HAVE 6 OR MORE DRINKS ON ONE OCCASION: NEVER
HOW MANY STANDARD DRINKS CONTAINING ALCOHOL DO YOU HAVE ON A TYPICAL DAY: PATIENT DOES NOT DRINK

## 2025-03-14 ASSESSMENT — PAIN DESCRIPTION - DESCRIPTORS
DESCRIPTORS: SORE

## 2025-03-14 ASSESSMENT — PATIENT HEALTH QUESTIONNAIRE - PHQ9
1. LITTLE INTEREST OR PLEASURE IN DOING THINGS: NOT AT ALL
SUM OF ALL RESPONSES TO PHQ9 QUESTIONS 1 & 2: 0
2. FEELING DOWN, DEPRESSED OR HOPELESS: NOT AT ALL

## 2025-03-14 ASSESSMENT — PAIN SCALES - PAIN ASSESSMENT IN ADVANCED DEMENTIA (PAINAD): TOTALSCORE: MEDICATION (SEE MAR)

## 2025-03-14 ASSESSMENT — PAIN DESCRIPTION - LOCATION
LOCATION: ABDOMEN
LOCATION: ABDOMEN

## 2025-03-14 NOTE — ANESTHESIA PROCEDURE NOTES
Airway  Date/Time: 3/14/2025 7:56 AM  Urgency: elective    Airway not difficult    Staffing  Performed: [facility]   Authorized by: Lorenzo Stanley MD    Performed by: Karla Andrew RN  Patient location during procedure: OR    Indications and Patient Condition  Indications for airway management: anesthesia  Spontaneous Ventilation: absent  Sedation level: deep  Preoxygenated: yes  Patient position: sniffing  MILS maintained throughout  Mask difficulty assessment: 1 - vent by mask  Planned trial extubation    Final Airway Details  Final airway type: endotracheal airway      Successful airway: ETT  Cuffed: yes   Successful intubation technique: video laryngoscopy (Lyons)  Facilitating devices/methods: intubating stylet  Endotracheal tube insertion site: oral  Blade: Dontae  Blade size: #4  ETT size (mm): 7.0  Cormack-Lehane Classification: grade IIb - view of arytenoids or posterior of glottis only  Placement verified by: chest auscultation and capnometry   Cuff volume (mL): 8  Measured from: lips  ETT to lips (cm): 21  Number of attempts at approach: 2  Ventilation between attempts: none  Number of other approaches attempted: 0    Additional Comments  Attempt with DL unsuccessful - grade llb view. Success with Lynos - grade lla view.

## 2025-03-14 NOTE — HOSPITAL COURSE
Betsy Sams is a 60 yo female with history of adenocarcinoma of lung with mets to brain, subclavian steal syndrome and incisional hernia from prior open appendectomy who presented to [facility] on 3/14 for elective incisional hernia repair with Dr. Seo. Due to significant adhesions noted during laparoscopic surgery, patient was transitioned to open repair and admitted for observation.

## 2025-03-14 NOTE — ANESTHESIA PREPROCEDURE EVALUATION
Patient: Betsy Sams    Procedure Information       Date/Time: 03/14/25 0730    Procedure: LAPAROSCOPIC INCARCERATED INCISIONAL HERNIA  REPAIR WITH MESH/ POSSIBLE OPEN - Laparoscopic, possible open, repair of incarcerated incisional hernia, with mesh; need Bard mesh rep    Location: [facility] OR 08 / Virtual [facility] OR    Surgeons: Kumar Seo MD            Relevant Problems   Anesthesia (within normal limits)      Cardiac   (+) Hypertriglyceridemia   (+) Mixed hyperlipidemia   (+) PAD (peripheral artery disease) (CMS-HCC)      Pulmonary   (+) Adenocarcinoma, lung (Multi)   (+) TOAN (obstructive sleep apnea)   (+) Stage 4 lung cancer (Multi)      Neuro   (+) Anxiety   (+) Depression   (+) Lung cancer metastatic to brain (Multi)   (+) Metastatic cancer to brain (Multi)      GI   (+) Dysphagia   (+) GERD (gastroesophageal reflux disease)   (+) IBS (irritable bowel syndrome)      Liver   (+) Gallbladder sludge      Endocrine   (+) Hypothyroidism   (+) Hypothyroidism due to medication   (+) Type 2 diabetes mellitus with stage 3 chronic kidney disease, without long-term current use of insulin, unspecified whether stage 3a or 3b CKD (Multi)      HEENT   (+) Chronic sinusitis      ID   (+) Onychomycosis of toenail      GYN   (+) History of hysterectomy       Clinical information reviewed:   Tobacco  Allergies  Meds   Med Hx  Surg Hx   Fam Hx  Soc Hx        NPO Detail:  NPO/Void Status  Date of Last Liquid: 03/14/25  Time of Last Liquid: 0000  Date of Last Solid: 03/14/25  Time of Last Solid: 0000         Physical Exam    Airway  Mallampati: II  TM distance: >3 FB  Neck ROM: full     Cardiovascular - normal exam  Rhythm: regular  Rate: normal     Dental - normal exam     Pulmonary - normal exam  Breath sounds clear to auscultation     Abdominal            Anesthesia Plan    History of general anesthesia?: yes  History of complications of general anesthesia?: no    ASA 3     general   (Solucortef 100mg IV preop)  The  patient is not a current smoker.    intravenous induction   Postoperative administration of opioids is intended.  Trial extubation is planned.  Anesthetic plan and risks discussed with patient.  Use of blood products discussed with patient who consented to blood products.    Plan discussed with CRNA and CAA.

## 2025-03-14 NOTE — ANESTHESIA POSTPROCEDURE EVALUATION
Patient: Betsy Sams    Procedure Summary       Date: 03/14/25 Room / Location: [facility] 08 / [facility]    Anesthesia Start: 0745 Anesthesia Stop:     Procedure: LAPAROSCOPIC LYSIS OF ADHESIONS CONVERTED TO OPEN INCARCERATED INCISIONAL HERNIA  REPAIR WITH MESH Diagnosis:       Incarcerated incisional hernia      (Incarcerated incisional hernia [K43.0])    Surgeons: Kumar Seo MD Responsible Provider: Lorenzo Stanley MD    Anesthesia Type: general ASA Status: 3            Anesthesia Type: general    Vitals Value Taken Time   /62 03/14/25 1029   Temp 36.2 °C (97.2 °F) 03/14/25 1029   Pulse 80 03/14/25 1029   Resp 16 03/14/25 1029   SpO2 98 % 03/14/25 1029       Anesthesia Post Evaluation    Patient location during evaluation: PACU  Patient participation: complete - patient participated  Level of consciousness: awake and alert  Pain score: 6  Pain management: adequate  Airway patency: patent  Cardiovascular status: acceptable, blood pressure returned to baseline and hemodynamically stable  Respiratory status: acceptable and face mask  Hydration status: acceptable  Postoperative Nausea and Vomiting: none        There were no known notable events for this encounter.

## 2025-03-14 NOTE — OP NOTE
LAPAROSCOPIC INCARCERATED INCISIONAL HERNIA  REPAIR WITH MESH/ POSSIBLE OPEN Operative Note     Date: 3/14/2025  OR Location: [facility]    Name: Betsy Sams, : 1965, Age: 59 y.o., MRN: [id number], Sex: female    Diagnosis  Pre-op Diagnosis      * Incarcerated incisional hernia [K43.0] Post-op Diagnosis     * Incarcerated incisional hernia [K43.0]    Diffuse intra-abdominal adhesions     Procedures    Laparoscopic and open lysis of adhesions 33474 - [id number]    Open repair of multiple (3) small bowel serosal tears 73109-91    Open repair of incarcerated incisional hernia with mesh (3-10 cm) 68224      Surgeons      * Kumar eSo - Primary    Resident/Fellow/Other Assistant:  Elva Alvarado SA    Staff:   Circulator: Lizz Mayfield Person: Nasir  Surgical Assistant: Elva    Anesthesia Staff: Anesthesiologist: Lorenzo Stanley MD  CRNA: SUHAS Huizar-CRNA  SRNA: Karla Andrew RN    Procedure Summary  Anesthesia: General  ASA: III  Estimated Blood Loss: 30 mL  Intra-op Medications: * Intraprocedure medication information is unavailable because the case start and end events have not been set *           Anesthesia Record               Intraprocedure I/O Totals       None           Specimen: See below    Drains and/or Catheters: 10 mm fully-perforated Tristan-Mc drain subcutaneous space    Tourniquet Times:       Implants: Bard Soft Mesh    Findings: See below    Indications: Betsy Sams is an 59 y.o. female who is having surgery for Incarcerated incisional hernia [K43.0].     The patient was seen in the preoperative area. The risks, benefits, complications, treatment options, non-operative alternatives, expected recovery and outcomes were discussed with the patient. The possibilities of reaction to medication, pulmonary aspiration, injury to surrounding structures, bleeding, recurrent infection, the need for additional procedures, failure to diagnose a condition, and creating a complication  requiring transfusion or operation were discussed with the patient. The patient concurred with the proposed plan, giving informed consent.  The site of surgery was properly noted/marked if necessary per policy. The patient has been actively warmed in preoperative area. Preoperative antibiotics are not indicated. Venous thrombosis prophylaxis have been ordered including bilateral sequential compression devices    Procedure Details:     Findings:  The patient had diffuse intra-abdominal adhesions to the anterior parietal peritoneum particularly along the previous midline scar; these were more flimsy omental adhesions, and she had both flimsy and extremely dense small bowel adhesions to the anterior parietal peritoneum.  The adhesions required at least  30 minutes of laparoscopic lysis of adhesions, and another 15 minutes of open lysis of adhesion (45 minutes total)    With regard to the fascial defects, the patient had 3 adjacent separate fascial defects with small fascial bridges in between (Panamanian cheese pattern); each defect was roughly 2-3 cm; at the conclusion of the case, the patient's final fascial defect measured 7 cm sagittal by 3 cm transverse    Specimens:  Hernia sacs    Procedure:      The patient was taken to the operating room placed on the table in the supine position. Preoperative huddle was accomplished with the OR team and the patient.  Satisfactory general endotracheal anesthesia was achieved. The abdomen was widely prepared and draped in normal sterile fashion.  After the appropriate timeout, the case commenced.    In the left upper quadrant of the abdomen, at [place], a 5 mm Visiport trocar was placed in the standard fashion without difficulty. Pneumoperitoneum was achieved in the standard fashion. The above findings were noted. Under direct vision, additional 5 mm trochars were placed in the left lower quadrant, right lower quadrant, and right upper quadrant, for a total of 4 trochars.      Using careful and meticulous dissection, using traction and countertraction, sharp dissection with scissors without cautery, and a 5 mm LigaSure device when necessary, the fatty omental adhesions were taken down from the peritoneum where able.  Following this, where able, the small bowel was freed from the anterior parietal peritoneum along those segments where the adhesions were not extremely dense.  However, slightly left lateral and inferior to the fascial defects, the small bowel was extremely densely adhered to the anterior parietal peritoneum, precluding laparoscopic dissection.  At this point, the case was converted to an open procedure.  Prior to this, the 3 fascial defects were defined and measured.      Following this, the pneumoperitoneum was evacuated, and the 5 mm trocars were removed.  A 12 cm midline incision was made directly over the fascial defects along the patient's previous midline scar near the umbilicus.  The skin and subcutaneous tissues divided down to the hernia sacs.  The hernia sacs were opened.  The fascia was divided superiorly, and inferiorly, incorporating the 3 fascial defects.  The hernia sacs were divided and sent to pathology as a specimen.  The fascia was elevated and using very careful and meticulous dissection, with Metzenbaum scissors, the small bowel was freed from the anterior parietal peritoneum.  The small bowel was adhered both to the parietal peritoneum and to an adjacent small bowel loop.  All of these adhesions were divided and the 2 adjacent small bowel loops were freed and carefully inspected.  During this dissection, three very small superficial serosal tears occurred along the main culprit small bowel loop.  The superficial injuries were inherent to the dissection and procedure.  These were all repaired using simple 3-0 silk Lembert seromuscular sutures without incident.  Following this, the parietal peritoneum surrounding the defect was completely freed of  adhesions, and the fascial defect was ready for repair.    Circumferential skin flaps were then created using electrocautery freeing the anterior fascia circumferentially.    Six fixation sutures were then placed full-thickness through the fascia roughly 3 cm from the fascial defect using 0 Prolene sutures.  Two were placed bilaterally, one superiorly and one inferiorly.    Following this, the fascial defect was approximated using figure-of-eight 0 Ethibond sutures.    Thereafter, a 4 x 6 inch piece of Bard Soft Mesh was then used as an onlay mesh.  This was placed over the midline fascial suture line, and secured circumferentially using the previously placed 6 fixation sutures.  The soft mesh was then trimmed to the appropriate shape and size.  Following this, the edge of the mesh was then circumferentially adhered to the fascia using running 2-0 Prolene sutures.  This completed the repair.    Following this, the wound was irrigated and dried.  A 10 mm flat fully perforated Tristan-Mc drain was placed in the subcutaneous tissues along the mesh, cut to the appropriate length, and this exited through the 5 mm trocar incision in the left lower quadrant.  This was secured to the skin using a 3-0 nylon suture, and ultimately placed to bulb suction.  The deep subcutaneous tissues were approximated with interrupted 2-0 Vicryl sutures,  superficial subcutaneous tissues were approximated a running 3-0 Vicryl suture, and the skin was then approximated using a running 4-0 undyed subicular Vicryl suture.  This given at the remaining 5 mm trocar sites was approximated using running 4-0 undyed subicular Vicryl sutures.  This completed the operation.    Following this, the wounds were cleaned and dried, Mastisol and Steri-Strips were placed, followed by a drain sponge, and dry sterile occlusive dressings using Medipore tape.    The patient tolerated the procedure well. Sponge, needle, and instrument counts were correct times  2. Total IVF were 1000 cc of crystalloid and EBL was 30  cc, and urine output was not measured.  The patient was extubated in the operating room, and taken to the PACU with stable vital signs and in excellent condition.    Kumar Seo M.D.    Complications:  None; patient tolerated the procedure well.    Disposition: PACU - hemodynamically stable.  Condition: stable     Task Performed by RNFA or Surgical Assistant:  Performed the typical tasks required of a First Assistant/Surgical Assistant/LALITO First Assist or Physician Assistant such as prepping the patient, placing Hawkins catheter if necessary, retraction, assisting with traction and countertraction, tying sutures, docking and assisting with instrumentation when utilizing the DaVinci robot, securing drains if necessary, approximating the skin of the incision, and placing occlusive sterile dressings.    Additional Details: None    Attending Attestation: I performed the procedure.    Kumar Seo  Phone Number: [phone]

## 2025-03-14 NOTE — CARE PLAN
The patient's goals for the shift include      The clinical goals for the shift include comfort and safety    Problem: Pain - Adult  Goal: Verbalizes/displays adequate comfort level or baseline comfort level  Reactivated     Problem: Safety - Adult  Goal: Free from fall injury  Reactivated     Problem: Discharge Planning  Goal: Discharge to home or other facility with appropriate resources  Reactivated     Problem: Chronic Conditions and Co-morbidities  Goal: Patient's chronic conditions and co-morbidity symptoms are monitored and maintained or improved  Reactivated     Problem: Nutrition  Goal: Nutrient intake appropriate for maintaining nutritional needs  Reactivated     Problem: Pain  Goal: Takes deep breaths with improved pain control throughout the shift  Reactivated  Goal: Turns in bed with improved pain control throughout the shift  Reactivated  Goal: Walks with improved pain control throughout the shift  Reactivated  Goal: Performs ADL's with improved pain control throughout shift  Reactivated  Goal: Participates in PT with improved pain control throughout the shift  Reactivated  Goal: Free from opioid side effects throughout the shift  Reactivated  Goal: Free from acute confusion related to pain meds throughout the shift  Reactivated

## 2025-03-14 NOTE — DISCHARGE INSTRUCTIONS
For postoperative analgesia/pain relief, I recommend:      a.  Tylenol Extra Strength 500 mg with ibuprofen 400-600 mg (two or three, 200 mg Advil or Motrin tablets ) 4 times a day with food, on schedule, for 2-3 days, then as needed thereafter.       B.  Gabapentin 300 mg p.o. 3 times daily x 10 days on schedule     b.  Supplement with oxycodone 5 mg, dispense #16 for more severe or breakthrough pain, as needed.    Keep dressings ON until Monday. Keep everything DRY until Monday at which point you can remove dressings and shower.

## 2025-03-15 VITALS
HEART RATE: 73 BPM | SYSTOLIC BLOOD PRESSURE: 98 MMHG | OXYGEN SATURATION: 92 % | DIASTOLIC BLOOD PRESSURE: 56 MMHG | HEIGHT: 66 IN | TEMPERATURE: 98.4 F | RESPIRATION RATE: 18 BRPM | WEIGHT: 179 LBS | BODY MASS INDEX: 28.77 KG/M2

## 2025-03-15 LAB
ANION GAP SERPL CALC-SCNC: 12 MMOL/L (ref 10–20)
BUN SERPL-MCNC: 21 MG/DL (ref 6–23)
CALCIUM SERPL-MCNC: 8.6 MG/DL (ref 8.6–10.3)
CHLORIDE SERPL-SCNC: 105 MMOL/L (ref 98–107)
CO2 SERPL-SCNC: 25 MMOL/L (ref 21–32)
CREAT SERPL-MCNC: 0.95 MG/DL (ref 0.5–1.05)
EGFRCR SERPLBLD CKD-EPI 2021: 69 ML/MIN/1.73M*2
ERYTHROCYTE [DISTWIDTH] IN BLOOD BY AUTOMATED COUNT: 12.6 % (ref 11.5–14.5)
GLUCOSE BLD MANUAL STRIP-MCNC: 109 MG/DL (ref 74–99)
GLUCOSE BLD MANUAL STRIP-MCNC: 128 MG/DL (ref 74–99)
GLUCOSE BLD MANUAL STRIP-MCNC: 155 MG/DL (ref 74–99)
GLUCOSE SERPL-MCNC: 114 MG/DL (ref 74–99)
HCT VFR BLD AUTO: 38.6 % (ref 36–46)
HGB BLD-MCNC: 12.4 G/DL (ref 12–16)
MCH RBC QN AUTO: 29.9 PG (ref 26–34)
MCHC RBC AUTO-ENTMCNC: 32.1 G/DL (ref 32–36)
MCV RBC AUTO: 93 FL (ref 80–100)
NRBC BLD-RTO: 0 /100 WBCS (ref 0–0)
PLATELET # BLD AUTO: 223 X10*3/UL (ref 150–450)
POTASSIUM SERPL-SCNC: 4.2 MMOL/L (ref 3.5–5.3)
RBC # BLD AUTO: 4.15 X10*6/UL (ref 4–5.2)
SODIUM SERPL-SCNC: 138 MMOL/L (ref 136–145)
WBC # BLD AUTO: 7.2 X10*3/UL (ref 4.4–11.3)

## 2025-03-15 PROCEDURE — 82947 ASSAY GLUCOSE BLOOD QUANT: CPT | Mod: 59

## 2025-03-15 PROCEDURE — 36415 COLL VENOUS BLD VENIPUNCTURE: CPT

## 2025-03-15 PROCEDURE — 85027 COMPLETE CBC AUTOMATED: CPT

## 2025-03-15 PROCEDURE — 2500000002 HC RX 250 W HCPCS SELF ADMINISTERED DRUGS (ALT 637 FOR MEDICARE OP, ALT 636 FOR OP/ED)

## 2025-03-15 PROCEDURE — 2500000004 HC RX 250 GENERAL PHARMACY W/ HCPCS (ALT 636 FOR OP/ED)

## 2025-03-15 PROCEDURE — 2500000001 HC RX 250 WO HCPCS SELF ADMINISTERED DRUGS (ALT 637 FOR MEDICARE OP)

## 2025-03-15 PROCEDURE — 7100000011 HC EXTENDED STAY RECOVERY HOURLY - NURSING UNIT

## 2025-03-15 PROCEDURE — 96372 THER/PROPH/DIAG INJ SC/IM: CPT

## 2025-03-15 PROCEDURE — 82374 ASSAY BLOOD CARBON DIOXIDE: CPT

## 2025-03-15 RX ORDER — ACETAMINOPHEN 325 MG/1
975 TABLET ORAL EVERY 8 HOURS
Start: 2025-03-15

## 2025-03-15 RX ADMIN — GABAPENTIN 300 MG: 300 CAPSULE ORAL at 14:25

## 2025-03-15 RX ADMIN — ACETAMINOPHEN 975 MG: 325 TABLET, FILM COATED ORAL at 06:16

## 2025-03-15 RX ADMIN — PANTOPRAZOLE SODIUM 40 MG: 40 TABLET, DELAYED RELEASE ORAL at 06:16

## 2025-03-15 RX ADMIN — ATORVASTATIN CALCIUM 20 MG: 20 TABLET, FILM COATED ORAL at 08:23

## 2025-03-15 RX ADMIN — KETOROLAC TROMETHAMINE 15 MG: 30 INJECTION, SOLUTION INTRAMUSCULAR at 06:43

## 2025-03-15 RX ADMIN — HYDROCORTISONE 20 MG: 10 TABLET ORAL at 09:29

## 2025-03-15 RX ADMIN — HEPARIN SODIUM 5000 UNITS: 5000 INJECTION, SOLUTION INTRAVENOUS; SUBCUTANEOUS at 13:24

## 2025-03-15 RX ADMIN — SODIUM CHLORIDE 500 ML: 9 INJECTION, SOLUTION INTRAVENOUS at 08:20

## 2025-03-15 RX ADMIN — GABAPENTIN 300 MG: 300 CAPSULE ORAL at 08:23

## 2025-03-15 RX ADMIN — ESCITALOPRAM OXALATE 5 MG: 10 TABLET ORAL at 08:23

## 2025-03-15 RX ADMIN — HYDROCORTISONE 10 MG: 10 TABLET ORAL at 17:52

## 2025-03-15 RX ADMIN — LEVOTHYROXINE SODIUM 137 MCG: 25 TABLET ORAL at 06:16

## 2025-03-15 RX ADMIN — KETOROLAC TROMETHAMINE 15 MG: 30 INJECTION, SOLUTION INTRAMUSCULAR at 15:01

## 2025-03-15 RX ADMIN — ACETAMINOPHEN 975 MG: 325 TABLET, FILM COATED ORAL at 13:24

## 2025-03-15 RX ADMIN — HEPARIN SODIUM 5000 UNITS: 5000 INJECTION, SOLUTION INTRAVENOUS; SUBCUTANEOUS at 06:16

## 2025-03-15 RX ADMIN — INSULIN LISPRO 1 UNITS: 100 INJECTION, SOLUTION INTRAVENOUS; SUBCUTANEOUS at 16:38

## 2025-03-15 RX ADMIN — ASPIRIN 81 MG CHEWABLE TABLET 81 MG: 81 TABLET CHEWABLE at 08:23

## 2025-03-15 ASSESSMENT — PAIN SCALES - GENERAL: PAINLEVEL_OUTOF10: 0 - NO PAIN

## 2025-03-15 NOTE — CARE PLAN
The patient's goals for the shift include      The clinical goals for the shift include safety and comfort      Problem: Pain - Adult  Goal: Verbalizes/displays adequate comfort level or baseline comfort level  Outcome: Progressing     Problem: Safety - Adult  Goal: Free from fall injury  Outcome: Progressing     Problem: Discharge Planning  Goal: Discharge to home or other facility with appropriate resources  Outcome: Progressing     Problem: Chronic Conditions and Co-morbidities  Goal: Patient's chronic conditions and co-morbidity symptoms are monitored and maintained or improved  Outcome: Progressing     Problem: Nutrition  Goal: Nutrient intake appropriate for maintaining nutritional needs  Outcome: Progressing     Problem: Pain  Goal: Takes deep breaths with improved pain control throughout the shift  Outcome: Progressing  Goal: Turns in bed with improved pain control throughout the shift  Outcome: Progressing  Goal: Walks with improved pain control throughout the shift  Outcome: Progressing  Goal: Performs ADL's with improved pain control throughout shift  Outcome: Progressing  Goal: Participates in PT with improved pain control throughout the shift  Outcome: Progressing  Goal: Free from opioid side effects throughout the shift  Outcome: Progressing  Goal: Free from acute confusion related to pain meds throughout the shift  Outcome: Progressing

## 2025-03-15 NOTE — DISCHARGE SUMMARY
Discharge Diagnosis  Incarcerated incisional hernia    Issues Requiring Follow-Up  S/p open incisional hernia repair    Test Results Pending At Discharge  Pending Labs       Order Current Status    Surgical Pathology Exam In process            Hospital Course  Betsy Sams is a 58 yo female with history of adenocarcinoma of lung with mets to brain, subclavian steal syndrome and incisional hernia from prior open appendectomy who presented to [facility] on 3/14 for elective incisional hernia repair with Dr. Seo. Due to significant adhesions noted during laparoscopic surgery, patient was transitioned to open repair and admitted for observation.     Patient returned to nursing floor hemodynamically stable after OR. On day of discharge, tolerating regular diet without nausea, vomiting or increased abdominal pain. Patient passing gas and having BM's. SARINA drain was removed prior to discharge. Patient given instructions to remove bandages from abdomen on Monday, 3/21. She may shower after that. Keep follow up appointment with Dr. Seo.     Discharge planning took longer than 30 minutes.     Pertinent Physical Exam At Time of Discharge  Physical Exam  Vitals reviewed.   Constitutional:       General: She is not in acute distress.     Appearance: Normal appearance. She is well-developed. She is not ill-appearing.   HENT:      Head: Atraumatic.   Eyes:      General: No scleral icterus.     Extraocular Movements: Extraocular movements intact.      Comments: Wearing glasses   Cardiovascular:      Rate and Rhythm: Normal rate and regular rhythm.   Pulmonary:      Effort: Pulmonary effort is normal.      Breath sounds: Normal breath sounds.   Abdominal:      Comments: Surgical incision covered with clean dry dressing; no strikethrough. No surrounding erythema or edema.     Site of SARINA drain with minimal serosanguinous fluid. Covered in DCD.    Musculoskeletal:      Cervical back: Full passive range of motion without pain.      Right  "lower leg: No edema.      Left lower leg: No edema.   Skin:     General: Skin is warm and dry.   Neurological:      Mental Status: She is alert.   Psychiatric:         Behavior: Behavior is cooperative.         Home Medications     Medication List      START taking these medications     acetaminophen 325 mg tablet; Commonly known as: Tylenol; Take 3 tablets   (975 mg) by mouth every 8 hours.     CONTINUE taking these medications     albuterol 90 mcg/actuation inhaler; Commonly known as: Ventolin HFA;   Inhale 2 puffs every 4 hours if needed for wheezing or shortness of   breath.   alcohol swabs pads, medicated   aspirin 81 mg chewable tablet   atorvastatin 20 mg tablet; Commonly known as: Lipitor; Take 1 tablet (20   mg) by mouth once daily.   BD Luer-Haily Syringe 3 mL 25 gauge x 1\" syringe; Generic drug: syringe   with needle   clopidogrel 75 mg tablet; Commonly known as: Plavix; Take 1 tablet (75   mg) by mouth once daily.   dexAMETHasone (PF) 10 mg/mL injection; Commonly known as: Decadron;   Inject 0.4 mL (4 mg) as directed 1 time if needed (once as needed if   unable to take PO hydrocortisone then go to the ED) for up to 1 dose.   dexAMETHasone 4 mg/mL injection; Commonly known as: Decadron   dulaglutide 1.5 mg/0.5 mL pen injector injection; Commonly known as:   Trulicity; Inject 1.5 mg under the skin 1 (one) time per week.   ergocalciferol 1250 mcg (50,000 units) capsule; Commonly known as:   Vitamin D-2; Take 1 capsule (50,000 Units) by mouth 1 (one) time per week.   escitalopram 5 mg tablet; Commonly known as: Lexapro; Take 1 tablet (5   mg) by mouth once daily.   famotidine 20 mg tablet; Commonly known as: Pepcid; Take 1 tablet (20   mg) by mouth 2 times a day.   fluticasone 50 mcg/actuation nasal spray; Commonly known as: Flonase;   Administer 1 spray into each nostril once daily. Shake gently. Before   first use, prime pump. After use, clean tip and replace cap.   gabapentin 300 mg capsule; Commonly known " as: Neurontin; Take 1 capsule   (300 mg) by mouth 3 times a day for 10 days.   hydrocortisone 10 mg tablet; Commonly known as: Cortef; 20mg in the   morning and 10mg in the afternoon, double the dose during sick days   levothyroxine 137 mcg tablet; Commonly known as: Synthroid, Levoxyl;   Take 1 tablet (137 mcg) by mouth early in the morning..   lidocaine HCl-hydrocortison ac 3-0.5 % cream cream; Commonly known as:   Radiaura; Apply 1 Application topically 2 times a day.   metFORMIN 500 mg tablet; Commonly known as: Glucophage; Take 1 tablet   (500 mg) by mouth 2 times daily (morning and late afternoon). Take with   food.   Microlet Lancet misc; Generic drug: lancets   ondansetron ODT 4 mg disintegrating tablet; Commonly known as:   Zofran-ODT; Dissolve 1 tablet (4 mg) in the mouth every 8 hours if needed   for nausea or vomiting.   OneTouch Ultra Test strip; Generic drug: blood sugar diagnostic; Bg   check daily   pantoprazole 40 mg EC tablet; Commonly known as: ProtoNix; Take 1 tablet   (40 mg) by mouth once daily in the morning. Take before meals. 30 MINUTES   PRIOR TO BREAKFAST   traZODone 50 mg tablet; Commonly known as: Desyrel; Take 1 tablet (50   mg) by mouth as needed at bedtime for sleep.   Vascepa 1 gram capsule; Generic drug: icosapent ethyL; Take 2 capsules   (2 g) by mouth 2 times daily (morning and late afternoon).       Outpatient Follow-Up  Future Appointments   Date Time Provider Department [place]   3/25/2025 10:00 AM Kumar Seo MD [id number] [facility]   3/26/2025  1:00 PM [facility] FAIRLW VASCULAR [id number] [facility] [place]   3/26/2025  1:30 PM Jorge Alberto Rodriguez MD [id number] [facility]   4/11/2025  1:45 PM Shwetha Wyman MD, MS [id number] None   4/18/2025 12:30 PM POR [id number] CT [id number] [facility]   4/18/2025  1:00 PM POR [id number] CT [id number] [facility]   4/23/2025 10:40 AM Tom Magana MD [id number] [place]   5/8/2025 11:40 AM Libra Gillespie MD [id number] [place]   6/17/2025 10:40 AM Bahman ADAME  DO Tiana [id number] [facility]   8/12/2025 11:00 AM POR [facility] MRI [id number] [facility]   8/19/2025  1:30 PM SUHAS Guadarrama-CNP [address]       Kathy Manzano PA-C

## 2025-03-17 ENCOUNTER — APPOINTMENT (OUTPATIENT)
Dept: RADIOLOGY | Facility: HOSPITAL | Age: 60
End: 2025-03-17
Payer: COMMERCIAL

## 2025-03-17 ENCOUNTER — HOSPITAL ENCOUNTER (INPATIENT)
Facility: HOSPITAL | Age: 60
LOS: 2 days | Discharge: HOME | End: 2025-03-19
Attending: STUDENT IN AN ORGANIZED HEALTH CARE EDUCATION/TRAINING PROGRAM | Admitting: INTERNAL MEDICINE
Payer: COMMERCIAL

## 2025-03-17 DIAGNOSIS — R11.2 NAUSEA AND VOMITING, UNSPECIFIED VOMITING TYPE: ICD-10-CM

## 2025-03-17 DIAGNOSIS — J18.9 PNEUMONIA DUE TO INFECTIOUS ORGANISM, UNSPECIFIED LATERALITY, UNSPECIFIED PART OF LUNG: ICD-10-CM

## 2025-03-17 DIAGNOSIS — E27.40 ADRENAL INSUFFICIENCY (MULTI): Primary | ICD-10-CM

## 2025-03-17 LAB
ABO GROUP (TYPE) IN BLOOD: NORMAL
ALBUMIN SERPL BCP-MCNC: 3.5 G/DL (ref 3.4–5)
ALP SERPL-CCNC: 87 U/L (ref 33–136)
ALT SERPL W P-5'-P-CCNC: 22 U/L (ref 7–45)
ANION GAP SERPL CALC-SCNC: 17 MMOL/L (ref 10–20)
ANTIBODY SCREEN: NORMAL
APPEARANCE UR: CLEAR
APTT PPP: 27 SECONDS (ref 26–36)
AST SERPL W P-5'-P-CCNC: 22 U/L (ref 9–39)
BACTERIA #/AREA URNS AUTO: ABNORMAL /HPF
BASOPHILS # BLD AUTO: 0.02 X10*3/UL (ref 0–0.1)
BASOPHILS NFR BLD AUTO: 0.2 %
BILIRUB SERPL-MCNC: 1 MG/DL (ref 0–1.2)
BILIRUB UR STRIP.AUTO-MCNC: NEGATIVE MG/DL
BUN SERPL-MCNC: 15 MG/DL (ref 6–23)
CALCIUM SERPL-MCNC: 9.1 MG/DL (ref 8.6–10.3)
CHLORIDE SERPL-SCNC: 100 MMOL/L (ref 98–107)
CO2 SERPL-SCNC: 24 MMOL/L (ref 21–32)
COLOR UR: ABNORMAL
CREAT SERPL-MCNC: 1.11 MG/DL (ref 0.5–1.05)
EGFRCR SERPLBLD CKD-EPI 2021: 57 ML/MIN/1.73M*2
EOSINOPHIL # BLD AUTO: 0.02 X10*3/UL (ref 0–0.7)
EOSINOPHIL NFR BLD AUTO: 0.2 %
ERYTHROCYTE [DISTWIDTH] IN BLOOD BY AUTOMATED COUNT: 12.4 % (ref 11.5–14.5)
GLUCOSE BLD MANUAL STRIP-MCNC: 174 MG/DL (ref 74–99)
GLUCOSE SERPL-MCNC: 137 MG/DL (ref 74–99)
GLUCOSE UR STRIP.AUTO-MCNC: NORMAL MG/DL
HCT VFR BLD AUTO: 46.4 % (ref 36–46)
HGB BLD-MCNC: 15.5 G/DL (ref 12–16)
IMM GRANULOCYTES # BLD AUTO: 0.05 X10*3/UL (ref 0–0.7)
IMM GRANULOCYTES NFR BLD AUTO: 0.4 % (ref 0–0.9)
INR PPP: 1 (ref 0.9–1.1)
KETONES UR STRIP.AUTO-MCNC: NEGATIVE MG/DL
LACTATE SERPL-SCNC: 2 MMOL/L (ref 0.4–2)
LEUKOCYTE ESTERASE UR QL STRIP.AUTO: ABNORMAL
LYMPHOCYTES # BLD AUTO: 2.96 X10*3/UL (ref 1.2–4.8)
LYMPHOCYTES NFR BLD AUTO: 23.6 %
MAGNESIUM SERPL-MCNC: 1.69 MG/DL (ref 1.6–2.4)
MCH RBC QN AUTO: 30 PG (ref 26–34)
MCHC RBC AUTO-ENTMCNC: 33.4 G/DL (ref 32–36)
MCV RBC AUTO: 90 FL (ref 80–100)
MONOCYTES # BLD AUTO: 0.76 X10*3/UL (ref 0.1–1)
MONOCYTES NFR BLD AUTO: 6.1 %
MUCOUS THREADS #/AREA URNS AUTO: ABNORMAL /LPF
NEUTROPHILS # BLD AUTO: 8.71 X10*3/UL (ref 1.2–7.7)
NEUTROPHILS NFR BLD AUTO: 69.5 %
NITRITE UR QL STRIP.AUTO: NEGATIVE
NRBC BLD-RTO: 0 /100 WBCS (ref 0–0)
PH UR STRIP.AUTO: 5.5 [PH]
PHOSPHATE SERPL-MCNC: 3.7 MG/DL (ref 2.5–4.9)
PLATELET # BLD AUTO: 275 X10*3/UL (ref 150–450)
POTASSIUM SERPL-SCNC: 4.3 MMOL/L (ref 3.5–5.3)
PROT SERPL-MCNC: 6.6 G/DL (ref 6.4–8.2)
PROT UR STRIP.AUTO-MCNC: ABNORMAL MG/DL
PROTHROMBIN TIME: 11.4 SECONDS (ref 9.8–12.4)
RBC # BLD AUTO: 5.17 X10*6/UL (ref 4–5.2)
RBC # UR STRIP.AUTO: NEGATIVE MG/DL
RBC #/AREA URNS AUTO: ABNORMAL /HPF
RH FACTOR (ANTIGEN D): NORMAL
SODIUM SERPL-SCNC: 137 MMOL/L (ref 136–145)
SP GR UR STRIP.AUTO: >1.05
SQUAMOUS #/AREA URNS AUTO: ABNORMAL /HPF
UROBILINOGEN UR STRIP.AUTO-MCNC: NORMAL MG/DL
WBC # BLD AUTO: 12.5 X10*3/UL (ref 4.4–11.3)
WBC #/AREA URNS AUTO: ABNORMAL /HPF

## 2025-03-17 PROCEDURE — 99223 1ST HOSP IP/OBS HIGH 75: CPT | Performed by: STUDENT IN AN ORGANIZED HEALTH CARE EDUCATION/TRAINING PROGRAM

## 2025-03-17 PROCEDURE — 96376 TX/PRO/DX INJ SAME DRUG ADON: CPT

## 2025-03-17 PROCEDURE — 94640 AIRWAY INHALATION TREATMENT: CPT

## 2025-03-17 PROCEDURE — 96375 TX/PRO/DX INJ NEW DRUG ADDON: CPT

## 2025-03-17 PROCEDURE — 96365 THER/PROPH/DIAG IV INF INIT: CPT

## 2025-03-17 PROCEDURE — 71045 X-RAY EXAM CHEST 1 VIEW: CPT | Performed by: RADIOLOGY

## 2025-03-17 PROCEDURE — 96361 HYDRATE IV INFUSION ADD-ON: CPT

## 2025-03-17 PROCEDURE — 71045 X-RAY EXAM CHEST 1 VIEW: CPT

## 2025-03-17 PROCEDURE — 82947 ASSAY GLUCOSE BLOOD QUANT: CPT

## 2025-03-17 PROCEDURE — 2550000001 HC RX 255 CONTRASTS: Performed by: STUDENT IN AN ORGANIZED HEALTH CARE EDUCATION/TRAINING PROGRAM

## 2025-03-17 PROCEDURE — 2500000002 HC RX 250 W HCPCS SELF ADMINISTERED DRUGS (ALT 637 FOR MEDICARE OP, ALT 636 FOR OP/ED): Performed by: STUDENT IN AN ORGANIZED HEALTH CARE EDUCATION/TRAINING PROGRAM

## 2025-03-17 PROCEDURE — 81001 URINALYSIS AUTO W/SCOPE: CPT | Performed by: STUDENT IN AN ORGANIZED HEALTH CARE EDUCATION/TRAINING PROGRAM

## 2025-03-17 PROCEDURE — 85730 THROMBOPLASTIN TIME PARTIAL: CPT | Performed by: STUDENT IN AN ORGANIZED HEALTH CARE EDUCATION/TRAINING PROGRAM

## 2025-03-17 PROCEDURE — 83605 ASSAY OF LACTIC ACID: CPT | Performed by: STUDENT IN AN ORGANIZED HEALTH CARE EDUCATION/TRAINING PROGRAM

## 2025-03-17 PROCEDURE — 83735 ASSAY OF MAGNESIUM: CPT | Performed by: STUDENT IN AN ORGANIZED HEALTH CARE EDUCATION/TRAINING PROGRAM

## 2025-03-17 PROCEDURE — 36415 COLL VENOUS BLD VENIPUNCTURE: CPT | Performed by: STUDENT IN AN ORGANIZED HEALTH CARE EDUCATION/TRAINING PROGRAM

## 2025-03-17 PROCEDURE — 2500000001 HC RX 250 WO HCPCS SELF ADMINISTERED DRUGS (ALT 637 FOR MEDICARE OP): Performed by: STUDENT IN AN ORGANIZED HEALTH CARE EDUCATION/TRAINING PROGRAM

## 2025-03-17 PROCEDURE — 84100 ASSAY OF PHOSPHORUS: CPT | Performed by: STUDENT IN AN ORGANIZED HEALTH CARE EDUCATION/TRAINING PROGRAM

## 2025-03-17 PROCEDURE — 87075 CULTR BACTERIA EXCEPT BLOOD: CPT | Mod: PORLAB | Performed by: STUDENT IN AN ORGANIZED HEALTH CARE EDUCATION/TRAINING PROGRAM

## 2025-03-17 PROCEDURE — 99285 EMERGENCY DEPT VISIT HI MDM: CPT | Mod: 25 | Performed by: STUDENT IN AN ORGANIZED HEALTH CARE EDUCATION/TRAINING PROGRAM

## 2025-03-17 PROCEDURE — 86900 BLOOD TYPING SEROLOGIC ABO: CPT | Performed by: STUDENT IN AN ORGANIZED HEALTH CARE EDUCATION/TRAINING PROGRAM

## 2025-03-17 PROCEDURE — 87449 NOS EACH ORGANISM AG IA: CPT | Mod: PORLAB | Performed by: STUDENT IN AN ORGANIZED HEALTH CARE EDUCATION/TRAINING PROGRAM

## 2025-03-17 PROCEDURE — 87086 URINE CULTURE/COLONY COUNT: CPT | Mod: PORLAB | Performed by: STUDENT IN AN ORGANIZED HEALTH CARE EDUCATION/TRAINING PROGRAM

## 2025-03-17 PROCEDURE — 85610 PROTHROMBIN TIME: CPT | Performed by: STUDENT IN AN ORGANIZED HEALTH CARE EDUCATION/TRAINING PROGRAM

## 2025-03-17 PROCEDURE — 87899 AGENT NOS ASSAY W/OPTIC: CPT | Mod: PORLAB | Performed by: STUDENT IN AN ORGANIZED HEALTH CARE EDUCATION/TRAINING PROGRAM

## 2025-03-17 PROCEDURE — 2060000001 HC INTERMEDIATE ICU ROOM DAILY

## 2025-03-17 PROCEDURE — 74177 CT ABD & PELVIS W/CONTRAST: CPT

## 2025-03-17 PROCEDURE — 82040 ASSAY OF SERUM ALBUMIN: CPT | Performed by: STUDENT IN AN ORGANIZED HEALTH CARE EDUCATION/TRAINING PROGRAM

## 2025-03-17 PROCEDURE — 2500000004 HC RX 250 GENERAL PHARMACY W/ HCPCS (ALT 636 FOR OP/ED): Performed by: STUDENT IN AN ORGANIZED HEALTH CARE EDUCATION/TRAINING PROGRAM

## 2025-03-17 PROCEDURE — 85025 COMPLETE CBC W/AUTO DIFF WBC: CPT | Performed by: STUDENT IN AN ORGANIZED HEALTH CARE EDUCATION/TRAINING PROGRAM

## 2025-03-17 PROCEDURE — 87040 BLOOD CULTURE FOR BACTERIA: CPT | Mod: PORLAB | Performed by: STUDENT IN AN ORGANIZED HEALTH CARE EDUCATION/TRAINING PROGRAM

## 2025-03-17 RX ORDER — PANTOPRAZOLE SODIUM 40 MG/1
40 TABLET, DELAYED RELEASE ORAL
Status: DISCONTINUED | OUTPATIENT
Start: 2025-03-18 | End: 2025-03-19 | Stop reason: HOSPADM

## 2025-03-17 RX ORDER — ACETAMINOPHEN 325 MG/1
650 TABLET ORAL EVERY 4 HOURS PRN
Status: DISCONTINUED | OUTPATIENT
Start: 2025-03-17 | End: 2025-03-19 | Stop reason: HOSPADM

## 2025-03-17 RX ORDER — INSULIN LISPRO 100 [IU]/ML
0-10 INJECTION, SOLUTION INTRAVENOUS; SUBCUTANEOUS
Status: DISCONTINUED | OUTPATIENT
Start: 2025-03-17 | End: 2025-03-19 | Stop reason: HOSPADM

## 2025-03-17 RX ORDER — ATORVASTATIN CALCIUM 20 MG/1
20 TABLET, FILM COATED ORAL NIGHTLY
Status: DISCONTINUED | OUTPATIENT
Start: 2025-03-17 | End: 2025-03-19 | Stop reason: HOSPADM

## 2025-03-17 RX ORDER — IPRATROPIUM BROMIDE AND ALBUTEROL SULFATE 2.5; .5 MG/3ML; MG/3ML
3 SOLUTION RESPIRATORY (INHALATION)
Status: DISCONTINUED | OUTPATIENT
Start: 2025-03-17 | End: 2025-03-17

## 2025-03-17 RX ORDER — TRAZODONE HYDROCHLORIDE 50 MG/1
50 TABLET ORAL NIGHTLY PRN
Status: DISCONTINUED | OUTPATIENT
Start: 2025-03-17 | End: 2025-03-19 | Stop reason: HOSPADM

## 2025-03-17 RX ORDER — CEFTRIAXONE 2 G/50ML
2 INJECTION, SOLUTION INTRAVENOUS EVERY 24 HOURS
Status: DISCONTINUED | OUTPATIENT
Start: 2025-03-17 | End: 2025-03-19 | Stop reason: HOSPADM

## 2025-03-17 RX ORDER — FLUTICASONE PROPIONATE 50 MCG
1 SPRAY, SUSPENSION (ML) NASAL DAILY
Status: DISCONTINUED | OUTPATIENT
Start: 2025-03-17 | End: 2025-03-19 | Stop reason: HOSPADM

## 2025-03-17 RX ORDER — ONDANSETRON HYDROCHLORIDE 2 MG/ML
4 INJECTION, SOLUTION INTRAVENOUS ONCE
Status: COMPLETED | OUTPATIENT
Start: 2025-03-17 | End: 2025-03-17

## 2025-03-17 RX ORDER — ALBUTEROL SULFATE 90 UG/1
2 INHALANT RESPIRATORY (INHALATION) EVERY 4 HOURS PRN
Status: DISCONTINUED | OUTPATIENT
Start: 2025-03-17 | End: 2025-03-19 | Stop reason: HOSPADM

## 2025-03-17 RX ORDER — MORPHINE SULFATE 4 MG/ML
4 INJECTION INTRAVENOUS ONCE
Status: COMPLETED | OUTPATIENT
Start: 2025-03-17 | End: 2025-03-17

## 2025-03-17 RX ORDER — ONDANSETRON HYDROCHLORIDE 2 MG/ML
4 INJECTION, SOLUTION INTRAVENOUS EVERY 6 HOURS PRN
Status: DISCONTINUED | OUTPATIENT
Start: 2025-03-17 | End: 2025-03-19 | Stop reason: HOSPADM

## 2025-03-17 RX ORDER — CLOPIDOGREL BISULFATE 75 MG/1
75 TABLET ORAL DAILY
Status: DISCONTINUED | OUTPATIENT
Start: 2025-03-17 | End: 2025-03-19 | Stop reason: HOSPADM

## 2025-03-17 RX ORDER — DEXTROSE 50 % IN WATER (D50W) INTRAVENOUS SYRINGE
12.5
Status: DISCONTINUED | OUTPATIENT
Start: 2025-03-17 | End: 2025-03-19 | Stop reason: HOSPADM

## 2025-03-17 RX ORDER — ICOSAPENT ETHYL 1 G/1
2 CAPSULE ORAL
Status: DISCONTINUED | OUTPATIENT
Start: 2025-03-17 | End: 2025-03-19 | Stop reason: HOSPADM

## 2025-03-17 RX ORDER — NAPROXEN SODIUM 220 MG/1
81 TABLET, FILM COATED ORAL
Status: DISCONTINUED | OUTPATIENT
Start: 2025-03-18 | End: 2025-03-19 | Stop reason: HOSPADM

## 2025-03-17 RX ORDER — SODIUM CHLORIDE, SODIUM LACTATE, POTASSIUM CHLORIDE, CALCIUM CHLORIDE 600; 310; 30; 20 MG/100ML; MG/100ML; MG/100ML; MG/100ML
75 INJECTION, SOLUTION INTRAVENOUS CONTINUOUS
Status: ACTIVE | OUTPATIENT
Start: 2025-03-17 | End: 2025-03-18

## 2025-03-17 RX ORDER — IPRATROPIUM BROMIDE AND ALBUTEROL SULFATE 2.5; .5 MG/3ML; MG/3ML
3 SOLUTION RESPIRATORY (INHALATION) EVERY 2 HOUR PRN
Status: DISCONTINUED | OUTPATIENT
Start: 2025-03-17 | End: 2025-03-19 | Stop reason: HOSPADM

## 2025-03-17 RX ORDER — DEXTROSE 50 % IN WATER (D50W) INTRAVENOUS SYRINGE
25
Status: DISCONTINUED | OUTPATIENT
Start: 2025-03-17 | End: 2025-03-19 | Stop reason: HOSPADM

## 2025-03-17 RX ORDER — HEPARIN SODIUM 5000 [USP'U]/ML
5000 INJECTION, SOLUTION INTRAVENOUS; SUBCUTANEOUS EVERY 8 HOURS
Status: DISCONTINUED | OUTPATIENT
Start: 2025-03-17 | End: 2025-03-19 | Stop reason: HOSPADM

## 2025-03-17 RX ORDER — ESCITALOPRAM OXALATE 10 MG/1
5 TABLET ORAL DAILY
Status: DISCONTINUED | OUTPATIENT
Start: 2025-03-17 | End: 2025-03-19 | Stop reason: HOSPADM

## 2025-03-17 RX ADMIN — CEFTRIAXONE 2 G: 2 INJECTION, SOLUTION INTRAVENOUS at 19:28

## 2025-03-17 RX ADMIN — MORPHINE SULFATE 4 MG: 4 INJECTION INTRAVENOUS at 14:38

## 2025-03-17 RX ADMIN — HYDROCORTISONE SODIUM SUCCINATE 50 MG: 100 INJECTION, POWDER, FOR SOLUTION INTRAMUSCULAR; INTRAVENOUS at 19:28

## 2025-03-17 RX ADMIN — SODIUM CHLORIDE 1000 ML: 0.9 INJECTION, SOLUTION INTRAVENOUS at 15:24

## 2025-03-17 RX ADMIN — SODIUM CHLORIDE 1000 ML: 9 INJECTION, SOLUTION INTRAVENOUS at 14:00

## 2025-03-17 RX ADMIN — IPRATROPIUM BROMIDE AND ALBUTEROL SULFATE 3 ML: 2.5; .5 SOLUTION RESPIRATORY (INHALATION) at 19:28

## 2025-03-17 RX ADMIN — TRAZODONE HYDROCHLORIDE 50 MG: 50 TABLET ORAL at 21:41

## 2025-03-17 RX ADMIN — CLOPIDOGREL 75 MG: 75 TABLET ORAL at 19:28

## 2025-03-17 RX ADMIN — ONDANSETRON 4 MG: 2 INJECTION INTRAMUSCULAR; INTRAVENOUS at 14:38

## 2025-03-17 RX ADMIN — ATORVASTATIN CALCIUM 20 MG: 20 TABLET, FILM COATED ORAL at 21:16

## 2025-03-17 RX ADMIN — ICOSAPENT ETHYL 2 G: 1000 CAPSULE ORAL at 19:29

## 2025-03-17 RX ADMIN — SODIUM CHLORIDE, POTASSIUM CHLORIDE, SODIUM LACTATE AND CALCIUM CHLORIDE 75 ML/HR: 600; 310; 30; 20 INJECTION, SOLUTION INTRAVENOUS at 20:13

## 2025-03-17 RX ADMIN — HYDROCORTISONE SODIUM SUCCINATE 100 MG: 100 INJECTION, POWDER, FOR SOLUTION INTRAMUSCULAR; INTRAVENOUS at 14:38

## 2025-03-17 RX ADMIN — ESCITALOPRAM OXALATE 5 MG: 10 TABLET ORAL at 19:28

## 2025-03-17 RX ADMIN — HEPARIN SODIUM 5000 UNITS: 5000 INJECTION INTRAVENOUS; SUBCUTANEOUS at 21:16

## 2025-03-17 RX ADMIN — INSULIN LISPRO 2 UNITS: 100 INJECTION, SOLUTION INTRAVENOUS; SUBCUTANEOUS at 21:17

## 2025-03-17 RX ADMIN — IOHEXOL 75 ML: 350 INJECTION, SOLUTION INTRAVENOUS at 15:13

## 2025-03-17 RX ADMIN — AZITHROMYCIN MONOHYDRATE 500 MG: 500 INJECTION, POWDER, LYOPHILIZED, FOR SOLUTION INTRAVENOUS at 20:13

## 2025-03-17 SDOH — ECONOMIC STABILITY: FOOD INSECURITY: WITHIN THE PAST 12 MONTHS, THE FOOD YOU BOUGHT JUST DIDN'T LAST AND YOU DIDN'T HAVE MONEY TO GET MORE.: NEVER TRUE

## 2025-03-17 SDOH — ECONOMIC STABILITY: INCOME INSECURITY: IN THE LAST 12 MONTHS, WAS THERE A TIME WHEN YOU WERE NOT ABLE TO PAY THE MORTGAGE OR RENT ON TIME?: NO

## 2025-03-17 SDOH — ECONOMIC STABILITY: TRANSPORTATION INSECURITY: IN THE PAST 12 MONTHS, HAS LACK OF TRANSPORTATION KEPT YOU FROM MEDICAL APPOINTMENTS OR FROM GETTING MEDICATIONS?: NO

## 2025-03-17 SDOH — ECONOMIC STABILITY: HOUSING INSECURITY: IN THE PAST 12 MONTHS, HOW MANY TIMES HAVE YOU MOVED WHERE YOU WERE LIVING?: 1

## 2025-03-17 SDOH — SOCIAL STABILITY: SOCIAL INSECURITY: HAVE YOU HAD THOUGHTS OF HARMING ANYONE ELSE?: NO

## 2025-03-17 SDOH — ECONOMIC STABILITY: INCOME INSECURITY: IN THE PAST 12 MONTHS HAS THE ELECTRIC, GAS, OIL, OR WATER COMPANY THREATENED TO SHUT OFF SERVICES IN YOUR HOME?: NO

## 2025-03-17 SDOH — ECONOMIC STABILITY: FOOD INSECURITY: WITHIN THE PAST 12 MONTHS, YOU WORRIED THAT YOUR FOOD WOULD RUN OUT BEFORE YOU GOT THE MONEY TO BUY MORE.: NEVER TRUE

## 2025-03-17 SDOH — ECONOMIC STABILITY: HOUSING INSECURITY

## 2025-03-17 SDOH — SOCIAL STABILITY: SOCIAL INSECURITY: WITHIN THE LAST YEAR, HAVE YOU BEEN HUMILIATED OR EMOTIONALLY ABUSED IN OTHER WAYS BY YOUR PARTNER OR EX-PARTNER?: NO

## 2025-03-17 SDOH — ECONOMIC STABILITY: HOUSING INSECURITY: IN THE LAST 12 MONTHS, HOW MANY PLACES HAVE YOU LIVED?: 1

## 2025-03-17 SDOH — ECONOMIC STABILITY: GENERAL

## 2025-03-17 SDOH — ECONOMIC STABILITY: FOOD INSECURITY: HOW HARD IS IT FOR YOU TO PAY FOR THE VERY BASICS LIKE FOOD, HOUSING, MEDICAL CARE, AND HEATING?: NOT HARD AT ALL

## 2025-03-17 SDOH — ECONOMIC STABILITY: FOOD INSECURITY

## 2025-03-17 SDOH — ECONOMIC STABILITY: HOUSING INSECURITY: IN THE LAST 12 MONTHS, WAS THERE A TIME WHEN YOU WERE NOT ABLE TO PAY THE MORTGAGE OR RENT ON TIME?: NO

## 2025-03-17 SDOH — SOCIAL STABILITY: SOCIAL INSECURITY: WITHIN THE LAST YEAR, HAVE YOU BEEN AFRAID OF YOUR PARTNER OR EX-PARTNER?: NO

## 2025-03-17 SDOH — ECONOMIC STABILITY: HOUSING INSECURITY: AT ANY TIME IN THE PAST 12 MONTHS, WERE YOU HOMELESS OR LIVING IN A SHELTER (INCLUDING NOW)?: NO

## 2025-03-17 SDOH — ECONOMIC STABILITY: TRANSPORTATION INSECURITY
IN THE PAST 12 MONTHS, HAS LACK OF TRANSPORTATION KEPT YOU FROM MEETINGS, WORK, OR FROM GETTING THINGS NEEDED FOR DAILY LIVING?: NO

## 2025-03-17 SDOH — ECONOMIC STABILITY: TRANSPORTATION INSECURITY

## 2025-03-17 SDOH — SOCIAL STABILITY: SOCIAL INSECURITY: WERE YOU ABLE TO COMPLETE ALL THE BEHAVIORAL HEALTH SCREENINGS?: YES

## 2025-03-17 SDOH — ECONOMIC STABILITY: HOUSING INSECURITY
IN THE LAST 12 MONTHS, WAS THERE A TIME WHEN YOU DID NOT HAVE A STEADY PLACE TO SLEEP OR SLEPT IN A SHELTER (INCLUDING NOW)?: NO

## 2025-03-17 SDOH — ECONOMIC STABILITY: FOOD INSECURITY: WITHIN THE PAST 12 MONTHS, YOU WORRIED THAT YOUR FOOD WOULD RUN OUT BEFORE YOU GOT MONEY TO BUY MORE.: NEVER TRUE

## 2025-03-17 SDOH — ECONOMIC STABILITY: HOUSING INSECURITY: IN THE PAST 12 MONTHS HAS THE ELECTRIC, GAS, OIL, OR WATER COMPANY THREATENED TO SHUT OFF SERVICES IN YOUR HOME?: NO

## 2025-03-17 SDOH — ECONOMIC STABILITY: TRANSPORTATION INSECURITY
IN THE PAST 12 MONTHS, HAS THE LACK OF TRANSPORTATION KEPT YOU FROM MEDICAL APPOINTMENTS OR FROM GETTING MEDICATIONS?: NO

## 2025-03-17 ASSESSMENT — LIFESTYLE VARIABLES
AUDIT-C TOTAL SCORE: 0
HOW OFTEN DO YOU HAVE 6 OR MORE DRINKS ON ONE OCCASION: NEVER
SUBSTANCE_ABUSE_PAST_12_MONTHS: NO
AUDIT-C TOTAL SCORE: 0
SKIP TO QUESTIONS 9-10: 1
HOW OFTEN DO YOU HAVE A DRINK CONTAINING ALCOHOL: NEVER
HOW MANY STANDARD DRINKS CONTAINING ALCOHOL DO YOU HAVE ON A TYPICAL DAY: PATIENT DOES NOT DRINK
PRESCIPTION_ABUSE_PAST_12_MONTHS: NO

## 2025-03-17 ASSESSMENT — COGNITIVE AND FUNCTIONAL STATUS - GENERAL
MOBILITY SCORE: 24
PATIENT BASELINE BEDBOUND: NO
DAILY ACTIVITIY SCORE: 24

## 2025-03-17 ASSESSMENT — ENCOUNTER SYMPTOMS
COUGH: 0
DYSURIA: 0
POLYDIPSIA: 0
SHORTNESS OF BREATH: 0
BLOOD IN STOOL: 0
HEMATURIA: 0
DIZZINESS: 0
CONFUSION: 0
COLOR CHANGE: 0
VOMITING: 1
TREMORS: 0
FATIGUE: 1
WEAKNESS: 0
FEVER: 0
ABDOMINAL PAIN: 1
CHILLS: 0
SLEEP DISTURBANCE: 0
PHOTOPHOBIA: 0
NAUSEA: 1
PALPITATIONS: 0
LIGHT-HEADEDNESS: 0

## 2025-03-17 ASSESSMENT — PAIN SCALES - GENERAL
PAINLEVEL_OUTOF10: 8
PAINLEVEL_OUTOF10: 7

## 2025-03-17 ASSESSMENT — ACTIVITIES OF DAILY LIVING (ADL)
LACK_OF_TRANSPORTATION: NO
FEEDING YOURSELF: INDEPENDENT
HEARING - RIGHT EAR: FUNCTIONAL
GROOMING: INDEPENDENT
PATIENT'S MEMORY ADEQUATE TO SAFELY COMPLETE DAILY ACTIVITIES?: YES
LACK_OF_TRANSPORTATION: NO
JUDGMENT_ADEQUATE_SAFELY_COMPLETE_DAILY_ACTIVITIES: YES
WALKS IN HOME: INDEPENDENT
BATHING: INDEPENDENT
ADEQUATE_TO_COMPLETE_ADL: YES
LACK_OF_TRANSPORTATION: NO
ASSISTIVE_DEVICE: EYEGLASSES
DRESSING YOURSELF: INDEPENDENT
HEARING - LEFT EAR: FUNCTIONAL
TOILETING: INDEPENDENT

## 2025-03-17 ASSESSMENT — COLUMBIA-SUICIDE SEVERITY RATING SCALE - C-SSRS
2. HAVE YOU ACTUALLY HAD ANY THOUGHTS OF KILLING YOURSELF?: NO
1. IN THE PAST MONTH, HAVE YOU WISHED YOU WERE DEAD OR WISHED YOU COULD GO TO SLEEP AND NOT WAKE UP?: NO
6. HAVE YOU EVER DONE ANYTHING, STARTED TO DO ANYTHING, OR PREPARED TO DO ANYTHING TO END YOUR LIFE?: NO

## 2025-03-17 ASSESSMENT — PAIN - FUNCTIONAL ASSESSMENT: PAIN_FUNCTIONAL_ASSESSMENT: 0-10

## 2025-03-17 ASSESSMENT — SOCIAL DETERMINANTS OF HEALTH (SDOH): IN THE PAST 12 MONTHS, HAS THE ELECTRIC, GAS, OIL, OR WATER COMPANY THREATENED TO SHUT OFF SERVICE IN YOUR HOME?: NO

## 2025-03-17 NOTE — ED PROVIDER NOTES
Chief Complaint   Patient presents with    Post-op Problem     S/p hernia repair on Friday, pt has been throwing up since discharge        HPI:  59 y.o. female with a history of lung cancer, adrenal insufficiency, hypothyroidism, diabetes, and status post incisional hernia repair on 3/14 who is presenting to the ED with nausea and vomiting.  Patient reports she was doing well postoperatively and went home on 3/15.  She woke up on 317 with nausea and has had recurrent vomiting since then.  She was told to double her hydrocortisone dose but only took about one third of what she should have had yesterday.  Was not able to keep any down today.  Does report generalized abdominal pain.  Had a bowel movement on day of discharge but none since then but admits she has not been taking much by mouth.  No dysuria or hematuria.  No chest pain cough or shortness of breath.    History provided by: patient  Limitations to history: none    ------------------------------------------------------------------------------------------------------------------------------------------    Physical Exam:  T 36.7 °C (98.1 °F)  HR (!) 101  BP 90/67  RR 20  O2 96 % Supplemental oxygen    Triage vitals reviewed.  Constitutional: Well developed adult in no acute distress, non toxic in appearance  Head: Normocephalic, atraumatic  Eyes: Pupils are equal. No conjunctival injection.  Neck: Supple. Trachea is midline.  Pulmonary: Normal work of breathing with no accessory muscle use noted.  Clear to auscultation bilaterally.   Cardiovascular: Tachycardic, regular.  2+ radial pulses bilaterally.   Abdomen: Soft, nondistended.  Midline abdominal incision clean dry intact.  For other small incisions also clean dry intact.  No drainage.  Has generalized abdominal tenderness no focal pain on palpation.  No rigidity.  Extremities: No gross deformities.  Moving all extremities spontaneously without difficulty.  Neuro: Awake and alert. Face is symmetric.   Speech is clear. No obvious focal findings.  Psych: Appropriate mood and affect.    ------------------------------------------------------------------------------------------------------------------------------------------    Medical Decision Making:  This patient is a 59 y.o. female with a history of lung cancer, adrenal insufficiency, hypothyroidism, diabetes, and status post recent incisional hernia repair who is presenting to the ED with recurrent nausea and vomiting.  Abdominal exam is fairly benign and expected for being so recently post op.  Do have concerns with her being unable to take her cortisone.  Her initial glucose was normal which was reassuring but low blood pressure may be indication of possible adrenal insufficiency.  She was started on normal saline and given morphine and Zofran for symptom control.  Did obtain CT abdomen pelvis which showed a small fluid collection, most likely operative seroma versus small hematoma.  Cannot rule out abscess based on the imaging but was reviewed with her surgeon who felt this was likely benign.  Remainder of labs were also reassuring has a mild leukocytosis but otherwise normal electrolytes.  She was given hydrocortisone 100 mg IV.  Still mildly hypotensive 80s over 50s after 2 L of IV fluid.  Given her baseline blood pressure is around 110 systolic, will admit to medicine for observation and further treatment given concerns for possible adrenal insufficiency.  Patient agreeable with plan.  Discussed with hospitalist service who accepts patient for admission.      ED Course:  ED Course as of 03/18/25 1156   Mon Mar 17, 2025   [id number] Spoke with patient's surgeon Dr. Seo via phone.  He does not believe there is any acute indication for transfer surgery based on description of CT results.  He will look at the imaging findings.  If new problems arise can discuss transfer but does not believe it is necessary right now.  Will admit to medicine for further treatment of  her hypertension and possible adrenal insufficiency. []      ED Course User Index  [] Kelly Koch DO         Diagnoses as of 03/18/25 1156   Adrenal insufficiency (Multi)   Nausea and vomiting, unspecified vomiting type        Clinical Impression:  Postoperative nausea and vomiting  Hypotension concerning for possible adrenal insufficiency    Disposition:  Discharge to home    Kelly Koch DO  Emergency Medicine         Kelly Koch DO  03/18/25 1201
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
## 2025-03-18 LAB
ALBUMIN SERPL BCP-MCNC: 2.9 G/DL (ref 3.4–5)
ALP SERPL-CCNC: 65 U/L (ref 33–136)
ALT SERPL W P-5'-P-CCNC: 17 U/L (ref 7–45)
ANION GAP SERPL CALC-SCNC: 11 MMOL/L (ref 10–20)
AST SERPL W P-5'-P-CCNC: 13 U/L (ref 9–39)
BACTERIA UR CULT: NORMAL
BASOPHILS # BLD AUTO: 0.01 X10*3/UL (ref 0–0.1)
BASOPHILS NFR BLD AUTO: 0.1 %
BILIRUB SERPL-MCNC: 0.4 MG/DL (ref 0–1.2)
BUN SERPL-MCNC: 13 MG/DL (ref 6–23)
CALCIUM SERPL-MCNC: 8 MG/DL (ref 8.6–10.3)
CHLORIDE SERPL-SCNC: 101 MMOL/L (ref 98–107)
CO2 SERPL-SCNC: 29 MMOL/L (ref 21–32)
CREAT SERPL-MCNC: 0.9 MG/DL (ref 0.5–1.05)
EGFRCR SERPLBLD CKD-EPI 2021: 73 ML/MIN/1.73M*2
EOSINOPHIL # BLD AUTO: 0.24 X10*3/UL (ref 0–0.7)
EOSINOPHIL NFR BLD AUTO: 3.1 %
ERYTHROCYTE [DISTWIDTH] IN BLOOD BY AUTOMATED COUNT: 12.3 % (ref 11.5–14.5)
GLUCOSE BLD MANUAL STRIP-MCNC: 103 MG/DL (ref 74–99)
GLUCOSE BLD MANUAL STRIP-MCNC: 146 MG/DL (ref 74–99)
GLUCOSE BLD MANUAL STRIP-MCNC: 182 MG/DL (ref 74–99)
GLUCOSE BLD MANUAL STRIP-MCNC: 190 MG/DL (ref 74–99)
GLUCOSE SERPL-MCNC: 95 MG/DL (ref 74–99)
HCT VFR BLD AUTO: 32.8 % (ref 36–46)
HGB BLD-MCNC: 10.8 G/DL (ref 12–16)
HOLD SPECIMEN: NORMAL
IMM GRANULOCYTES # BLD AUTO: 0.02 X10*3/UL (ref 0–0.7)
IMM GRANULOCYTES NFR BLD AUTO: 0.3 % (ref 0–0.9)
LEGIONELLA AG UR QL: NEGATIVE
LYMPHOCYTES # BLD AUTO: 2.32 X10*3/UL (ref 1.2–4.8)
LYMPHOCYTES NFR BLD AUTO: 29.9 %
MAGNESIUM SERPL-MCNC: 1.65 MG/DL (ref 1.6–2.4)
MCH RBC QN AUTO: 29.4 PG (ref 26–34)
MCHC RBC AUTO-ENTMCNC: 32.9 G/DL (ref 32–36)
MCV RBC AUTO: 89 FL (ref 80–100)
MONOCYTES # BLD AUTO: 0.47 X10*3/UL (ref 0.1–1)
MONOCYTES NFR BLD AUTO: 6.1 %
NEUTROPHILS # BLD AUTO: 4.7 X10*3/UL (ref 1.2–7.7)
NEUTROPHILS NFR BLD AUTO: 60.5 %
NRBC BLD-RTO: 0 /100 WBCS (ref 0–0)
PLATELET # BLD AUTO: 220 X10*3/UL (ref 150–450)
POTASSIUM SERPL-SCNC: 3.9 MMOL/L (ref 3.5–5.3)
PROT SERPL-MCNC: 5.3 G/DL (ref 6.4–8.2)
RBC # BLD AUTO: 3.67 X10*6/UL (ref 4–5.2)
S PNEUM AG UR QL: NEGATIVE
SODIUM SERPL-SCNC: 137 MMOL/L (ref 136–145)
WBC # BLD AUTO: 7.8 X10*3/UL (ref 4.4–11.3)

## 2025-03-18 PROCEDURE — 85025 COMPLETE CBC W/AUTO DIFF WBC: CPT | Performed by: STUDENT IN AN ORGANIZED HEALTH CARE EDUCATION/TRAINING PROGRAM

## 2025-03-18 PROCEDURE — 2500000004 HC RX 250 GENERAL PHARMACY W/ HCPCS (ALT 636 FOR OP/ED): Performed by: STUDENT IN AN ORGANIZED HEALTH CARE EDUCATION/TRAINING PROGRAM

## 2025-03-18 PROCEDURE — 84075 ASSAY ALKALINE PHOSPHATASE: CPT | Performed by: STUDENT IN AN ORGANIZED HEALTH CARE EDUCATION/TRAINING PROGRAM

## 2025-03-18 PROCEDURE — 2500000002 HC RX 250 W HCPCS SELF ADMINISTERED DRUGS (ALT 637 FOR MEDICARE OP, ALT 636 FOR OP/ED): Performed by: STUDENT IN AN ORGANIZED HEALTH CARE EDUCATION/TRAINING PROGRAM

## 2025-03-18 PROCEDURE — 36415 COLL VENOUS BLD VENIPUNCTURE: CPT | Performed by: STUDENT IN AN ORGANIZED HEALTH CARE EDUCATION/TRAINING PROGRAM

## 2025-03-18 PROCEDURE — 99233 SBSQ HOSP IP/OBS HIGH 50: CPT | Performed by: INTERNAL MEDICINE

## 2025-03-18 PROCEDURE — 1200000002 HC GENERAL ROOM WITH TELEMETRY DAILY

## 2025-03-18 PROCEDURE — 83735 ASSAY OF MAGNESIUM: CPT | Performed by: STUDENT IN AN ORGANIZED HEALTH CARE EDUCATION/TRAINING PROGRAM

## 2025-03-18 PROCEDURE — 99222 1ST HOSP IP/OBS MODERATE 55: CPT | Performed by: INTERNAL MEDICINE

## 2025-03-18 PROCEDURE — 94667 MNPJ CHEST WALL 1ST: CPT

## 2025-03-18 PROCEDURE — 82947 ASSAY GLUCOSE BLOOD QUANT: CPT

## 2025-03-18 PROCEDURE — 2500000001 HC RX 250 WO HCPCS SELF ADMINISTERED DRUGS (ALT 637 FOR MEDICARE OP): Performed by: STUDENT IN AN ORGANIZED HEALTH CARE EDUCATION/TRAINING PROGRAM

## 2025-03-18 RX ADMIN — PANTOPRAZOLE SODIUM 40 MG: 40 TABLET, DELAYED RELEASE ORAL at 06:03

## 2025-03-18 RX ADMIN — HEPARIN SODIUM 5000 UNITS: 5000 INJECTION INTRAVENOUS; SUBCUTANEOUS at 15:07

## 2025-03-18 RX ADMIN — FLUTICASONE PROPIONATE 1 SPRAY: 50 SPRAY, METERED NASAL at 08:57

## 2025-03-18 RX ADMIN — CEFTRIAXONE 2 G: 2 INJECTION, SOLUTION INTRAVENOUS at 19:06

## 2025-03-18 RX ADMIN — ICOSAPENT ETHYL 2 G: 1000 CAPSULE ORAL at 12:08

## 2025-03-18 RX ADMIN — ESCITALOPRAM OXALATE 5 MG: 10 TABLET ORAL at 08:57

## 2025-03-18 RX ADMIN — INSULIN LISPRO 2 UNITS: 100 INJECTION, SOLUTION INTRAVENOUS; SUBCUTANEOUS at 22:46

## 2025-03-18 RX ADMIN — AZITHROMYCIN MONOHYDRATE 500 MG: 500 INJECTION, POWDER, LYOPHILIZED, FOR SOLUTION INTRAVENOUS at 23:56

## 2025-03-18 RX ADMIN — HYDROCORTISONE SODIUM SUCCINATE 50 MG: 100 INJECTION, POWDER, FOR SOLUTION INTRAMUSCULAR; INTRAVENOUS at 06:16

## 2025-03-18 RX ADMIN — ATORVASTATIN CALCIUM 20 MG: 20 TABLET, FILM COATED ORAL at 20:45

## 2025-03-18 RX ADMIN — ASPIRIN 81 MG CHEWABLE TABLET 81 MG: 81 TABLET CHEWABLE at 06:03

## 2025-03-18 RX ADMIN — HYDROCORTISONE SODIUM SUCCINATE 50 MG: 100 INJECTION, POWDER, FOR SOLUTION INTRAMUSCULAR; INTRAVENOUS at 15:07

## 2025-03-18 RX ADMIN — LEVOTHYROXINE SODIUM 137 MCG: 0.11 TABLET ORAL at 06:03

## 2025-03-18 RX ADMIN — CLOPIDOGREL 75 MG: 75 TABLET ORAL at 08:57

## 2025-03-18 RX ADMIN — ACETAMINOPHEN 650 MG: 325 TABLET ORAL at 06:03

## 2025-03-18 RX ADMIN — HEPARIN SODIUM 5000 UNITS: 5000 INJECTION INTRAVENOUS; SUBCUTANEOUS at 22:00

## 2025-03-18 RX ADMIN — HEPARIN SODIUM 5000 UNITS: 5000 INJECTION INTRAVENOUS; SUBCUTANEOUS at 06:03

## 2025-03-18 RX ADMIN — ICOSAPENT ETHYL 2 G: 1000 CAPSULE ORAL at 19:04

## 2025-03-18 RX ADMIN — INSULIN LISPRO 2 UNITS: 100 INJECTION, SOLUTION INTRAVENOUS; SUBCUTANEOUS at 12:25

## 2025-03-18 RX ADMIN — HYDROCORTISONE SODIUM SUCCINATE 50 MG: 100 INJECTION, POWDER, FOR SOLUTION INTRAMUSCULAR; INTRAVENOUS at 22:45

## 2025-03-18 ASSESSMENT — ENCOUNTER SYMPTOMS
FEVER: 0
NAUSEA: 0
CHILLS: 0
DIARRHEA: 0
VOMITING: 0
CONFUSION: 0

## 2025-03-18 ASSESSMENT — COGNITIVE AND FUNCTIONAL STATUS - GENERAL
DAILY ACTIVITIY SCORE: 24
MOBILITY SCORE: 24

## 2025-03-18 ASSESSMENT — PAIN SCALES - GENERAL
PAINLEVEL_OUTOF10: 3
PAINLEVEL_OUTOF10: 1
PAINLEVEL_OUTOF10: 0 - NO PAIN
PAINLEVEL_OUTOF10: 3
PAINLEVEL_OUTOF10: 0 - NO PAIN

## 2025-03-18 ASSESSMENT — PAIN - FUNCTIONAL ASSESSMENT
PAIN_FUNCTIONAL_ASSESSMENT: 0-10
PAIN_FUNCTIONAL_ASSESSMENT: 0-10

## 2025-03-18 ASSESSMENT — PAIN DESCRIPTION - LOCATION: LOCATION: ABDOMEN

## 2025-03-18 NOTE — CARE PLAN
The patient's goals for the shift include  remain free from N&V    The clinical goals for the shift include  maintain safety; remain free from falls or injury

## 2025-03-18 NOTE — CARE PLAN
The patient's goals for the shift include  free from N&V    The clinical goals for the shift include  maintain safety
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
## 2025-03-18 NOTE — PROGRESS NOTES
Betsy Sams is a 59 y.o. female on day 1 of admission presenting with Adrenal insufficiency (Multi).    Subjective   Betsy Sams is a 59 y.o. 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 Seo at [facility]), presenting with nausea, vomiting, abdominal pain, and inability to tolerate oral intake. Pt reports that she felt well on day of discharge from [facility] (03/15/25, Saturday). However, yesterday (Sunday) she woke with nausea, and developed bilious emesis after a few small sips of soup. She has not been able to keep anything down since then and has missed two days of her home Cortef, reporting that she was originally instructed to double her dose post-op but has in total only taken about a third of what she should have. She also feels a bit congested in her chest despite using the incentive spirometer as instructed. She reports the persistent abdominal pain from her surgery is exacerbated by the vomiting. She has not had a BM since Saturday after she was discharged but has also not had much to eat since then. She reports that her body temp fluctuates at baseline but she does not believe she has had any noticeable fevers or chills outside of her norm. Denies cp/pressure, palpitations, diaphoresis, SOB, VERDE, dizziness / lightheadedness, syncope or near syncope, HA, vision changes, sick contacts or exposures.      ED Course (Summary - please note all labs, imaging studies, and interventions noted below have been personally reviewed and/or interpreted on day of admission):   Vitals on presentation: T 36.7 °C (98.1 °F)  HR (!) 101  BP 90/67  RR 20  O2 96 % Supplemental oxygen  Labs: CBC with WBC  12.5, Hgb 15.5, Plts 275. CMP with glucose 137, Na 137, K 4.3, BUN 15, sCr 1.11, alk phos 87, ALT 22, AST 22, bilirubin 1.0. Magnesium 1.69. Lactate 2.0.  EKG: Not completed in ED  Imaging: CT abd/pelvis with IV contrast - Small fluid collection in the umbilical region, most likely postoperative seroma hematoma.  Abscess cannot be excluded. Generalized anasarca. Fluid-filled colonic loops, indicative of a diarrheal state.   Interventions:   Meds - 1L NS, 4mg morphine, 4mg zofran, 100mg solucortef  Consults - ED spoke with patient's surgeon and they felt there was no need for transfer or surgical evaluation   Admitted to medicine      12-point ROS reviewed and found to be negative aside from aforementioned positives in HPI and/or noted in dedicated ROS section below.     3/18: She is much improved from admission. She was walking the halls just before I saw her.        Objective     Constitutional: Pleasant and cooperative. Laying in bed in no acute distress. Conversant.   Skin: Warm and dry; no obvious lesions, rashes, pallor, or jaundice.   Eyes: EOMI. Anicteric sclera. Glasses in place.   ENT: Mucous membranes dry; no obvious injury or deformity appreciated.   Head and Neck: Normocephalic, atraumatic. ROM preserved. Trachea midline. No appreciable JVD.   Respiratory: Nonlabored on RA. Lungs slightly diminished bilaterally, some rhonchi RLL. Chest rise is equal.  Cardiovascular: RRR. No gross murmur, gallop, or rub. Extremities are warm and well-perfused with good capillary refill (< 3 seconds). No chest wall tenderness.   GI: Abdomen soft, nondistended, appropriately TTP around incision site. No obvious organomegaly appreciated.   : No CVA tenderness.   MSK: No gross abnormalities appreciated. No limitations to AROM/PROM appreciated.   Extremities: No cyanosis, edema, or clubbing evident. Neurovascularly intact.   Neuro: A&Ox3. CN 2-12 grossly intact. Able to respond to questions appropriately and clearly. No  "acute focal neurologic deficits appreciated.  Psych: Appropriate mood and behavior.    Last Recorded Vitals  Blood pressure 104/66, pulse 72, temperature 36.8 °C (98.2 °F), temperature source Temporal, resp. rate 16, height 1.676 m (5' 6\"), weight 84.2 kg (185 lb 10 oz), SpO2 90%.  Intake/Output last 3 Shifts:  I/O last 3 completed shifts:  In: 1801.3 (21.4 mL/kg) [I.V.:501.3 (6 mL/kg); IV Piggyback:1300]  Out: - (0 mL/kg)   Weight: 84.2 kg     Relevant Results                              Assessment/Plan   Sepsis without shock 2/2 RLL PNA and UTI present on admission and on arrival to the ED  -SIRS criteria (2/4): WBCs, HR   -CXR w/ RLL > LLL PNA.   -UA concerning for infection  -Source: as above  -End-organ dysfunction: n/a  -Lactate 2.0  -BP has recovered with stress dose steroids   -Blood cultures pending  -Ucx pending  -Follow fever curve, WBCs  -Continue antibiotic coverage with ceftriaxone / azithromycin D2   -Respiratory therapies: Nebs, home inhalers, pulmonary hygiene, RT consultation appreciated       Adrenal insufficiency   -Patient with long-standing history of steroid use, +stress from recent surgery, +missed doses of steroids with n/v   -Continue stress-dose steroids   -Continue as above   -Endocrinology consult appreciated      Hypotension, multifactorial   -Likely related to adrenal insufficiency and missed steroids, mild dehydration; however cannot r/o contribution from infectious source  -Will be continued on stress-dose steroids  -Endocrinology consult appreciated      Nausea and vomiting, abdominal pain, inability to tolerate PO   Mild dehydration clinically   -Patient with dry mucous membranes on exam   -Patient's surgeon reported nothing about in the abdomen which reflects need for transfer unless clinical status changes   -Continue as above and advance diet as tolerated      RLL adenocarcinoma s/p 2-year pembrolizumab (03/01/23 - JOHNIE [id number]) with brain mets s/p GKRS  -Continue follow-up with " oncology as scheduled      DM-II   -Hold home oral meds for now   -Continue with SSI ACHS   -Accucheks, hypoglycemic protocol   -Monitor and adjust as needed       Hypothyroidism   -Continue home synthroid      Subclavian steal syndrome (L)   -Continue home DAPT  -Take BP on R arm      Depression   -Continue home therapies      GERD   -Continue PPI    Kumar Hernandez MD PhD

## 2025-03-18 NOTE — CARE PLAN
The patient's goals for the shift include  pt will remain HDS throughout shift.    The clinical goals for the shift include remain free of N&V    Over the shift, the patient did make progress toward the following goals. Barriers to progression include understanding. Recommendations to address these barriers include education.

## 2025-03-18 NOTE — PROGRESS NOTES
03/18/25 1214   Discharge Planning   Living Arrangements Friends   Support Systems Friends/neighbors   Assistance Needed independent   Type of Residence Private residence   Home or Post Acute Services None   Expected Discharge Disposition Home   Does the patient need discharge transport arranged? No   Intensity of Service   Intensity of Service 0-30 min     Met with patient at bedside, introduced self and role as RN TCC. Patient lives at home with roommates. She is independent in her own care. She is able to dress self, cook, clean, drive, etc. She manages her own medications at baseline. PCP is Bahman Montiel. She denies discharge needs when returning home. TCC to continue to follow if needs should arise.

## 2025-03-19 ENCOUNTER — PHARMACY VISIT (OUTPATIENT)
Dept: PHARMACY | Facility: CLINIC | Age: 60
End: 2025-03-19
Payer: COMMERCIAL

## 2025-03-19 ENCOUNTER — APPOINTMENT (OUTPATIENT)
Dept: VASCULAR MEDICINE | Facility: CLINIC | Age: 60
End: 2025-03-19
Payer: COMMERCIAL

## 2025-03-19 VITALS
SYSTOLIC BLOOD PRESSURE: 135 MMHG | OXYGEN SATURATION: 96 % | RESPIRATION RATE: 18 BRPM | HEIGHT: 66 IN | TEMPERATURE: 95.8 F | WEIGHT: 182.8 LBS | HEART RATE: 62 BPM | DIASTOLIC BLOOD PRESSURE: 76 MMHG | BODY MASS INDEX: 29.38 KG/M2

## 2025-03-19 PROBLEM — E27.40 ADRENAL INSUFFICIENCY (MULTI): Status: RESOLVED | Noted: 2023-03-16 | Resolved: 2025-03-19

## 2025-03-19 LAB
GLUCOSE BLD MANUAL STRIP-MCNC: 108 MG/DL (ref 74–99)
GLUCOSE BLD MANUAL STRIP-MCNC: 123 MG/DL (ref 74–99)

## 2025-03-19 PROCEDURE — RXMED WILLOW AMBULATORY MEDICATION CHARGE

## 2025-03-19 PROCEDURE — 99239 HOSP IP/OBS DSCHRG MGMT >30: CPT | Performed by: INTERNAL MEDICINE

## 2025-03-19 PROCEDURE — 2500000001 HC RX 250 WO HCPCS SELF ADMINISTERED DRUGS (ALT 637 FOR MEDICARE OP): Performed by: STUDENT IN AN ORGANIZED HEALTH CARE EDUCATION/TRAINING PROGRAM

## 2025-03-19 PROCEDURE — 82947 ASSAY GLUCOSE BLOOD QUANT: CPT

## 2025-03-19 PROCEDURE — 2500000002 HC RX 250 W HCPCS SELF ADMINISTERED DRUGS (ALT 637 FOR MEDICARE OP, ALT 636 FOR OP/ED): Performed by: STUDENT IN AN ORGANIZED HEALTH CARE EDUCATION/TRAINING PROGRAM

## 2025-03-19 PROCEDURE — 2500000004 HC RX 250 GENERAL PHARMACY W/ HCPCS (ALT 636 FOR OP/ED): Performed by: STUDENT IN AN ORGANIZED HEALTH CARE EDUCATION/TRAINING PROGRAM

## 2025-03-19 PROCEDURE — 99233 SBSQ HOSP IP/OBS HIGH 50: CPT | Performed by: INTERNAL MEDICINE

## 2025-03-19 PROCEDURE — 94668 MNPJ CHEST WALL SBSQ: CPT

## 2025-03-19 RX ORDER — MORPHINE SULFATE 2 MG/ML
2 INJECTION, SOLUTION INTRAMUSCULAR; INTRAVENOUS EVERY 4 HOURS PRN
Status: DISCONTINUED | OUTPATIENT
Start: 2025-03-19 | End: 2025-03-19 | Stop reason: HOSPADM

## 2025-03-19 RX ORDER — DIPHENHYDRAMINE HYDROCHLORIDE 50 MG/ML
50 INJECTION, SOLUTION INTRAMUSCULAR; INTRAVENOUS EVERY 6 HOURS PRN
Status: DISCONTINUED | OUTPATIENT
Start: 2025-03-19 | End: 2025-03-19 | Stop reason: HOSPADM

## 2025-03-19 RX ORDER — CEFDINIR 300 MG/1
300 CAPSULE ORAL 2 TIMES DAILY
Qty: 10 CAPSULE | Refills: 0 | Status: SHIPPED | OUTPATIENT
Start: 2025-03-19 | End: 2025-03-26 | Stop reason: ALTCHOICE

## 2025-03-19 RX ORDER — AZITHROMYCIN 500 MG/1
500 TABLET, FILM COATED ORAL DAILY
Qty: 1 TABLET | Refills: 0 | Status: SHIPPED | OUTPATIENT
Start: 2025-03-19 | End: 2025-03-26 | Stop reason: ALTCHOICE

## 2025-03-19 RX ADMIN — HEPARIN SODIUM 5000 UNITS: 5000 INJECTION INTRAVENOUS; SUBCUTANEOUS at 07:00

## 2025-03-19 RX ADMIN — LEVOTHYROXINE SODIUM 137 MCG: 0.11 TABLET ORAL at 07:00

## 2025-03-19 RX ADMIN — ICOSAPENT ETHYL 2 G: 1000 CAPSULE ORAL at 09:08

## 2025-03-19 RX ADMIN — MORPHINE SULFATE 2 MG: 2 INJECTION, SOLUTION INTRAMUSCULAR; INTRAVENOUS at 01:39

## 2025-03-19 RX ADMIN — PANTOPRAZOLE SODIUM 40 MG: 40 TABLET, DELAYED RELEASE ORAL at 07:07

## 2025-03-19 RX ADMIN — ASPIRIN 81 MG CHEWABLE TABLET 81 MG: 81 TABLET CHEWABLE at 07:00

## 2025-03-19 RX ADMIN — ESCITALOPRAM OXALATE 5 MG: 10 TABLET ORAL at 09:07

## 2025-03-19 RX ADMIN — CLOPIDOGREL 75 MG: 75 TABLET ORAL at 09:07

## 2025-03-19 RX ADMIN — FLUTICASONE PROPIONATE 1 SPRAY: 50 SPRAY, METERED NASAL at 09:07

## 2025-03-19 ASSESSMENT — COGNITIVE AND FUNCTIONAL STATUS - GENERAL
MOBILITY SCORE: 24
DAILY ACTIVITIY SCORE: 24

## 2025-03-19 ASSESSMENT — PAIN SCALES - GENERAL
PAINLEVEL_OUTOF10: 0 - NO PAIN
PAINLEVEL_OUTOF10: 0 - NO PAIN
PAINLEVEL_OUTOF10: 5 - MODERATE PAIN
PAINLEVEL_OUTOF10: 0 - NO PAIN
PAINLEVEL_OUTOF10: 2

## 2025-03-19 ASSESSMENT — PAIN - FUNCTIONAL ASSESSMENT
PAIN_FUNCTIONAL_ASSESSMENT: 0-10

## 2025-03-19 ASSESSMENT — ENCOUNTER SYMPTOMS
VOMITING: 0
ABDOMINAL PAIN: 0
NAUSEA: 0
CONFUSION: 0
DIZZINESS: 0

## 2025-03-19 ASSESSMENT — PAIN DESCRIPTION - LOCATION: LOCATION: ABDOMEN

## 2025-03-19 NOTE — NURSING NOTE
Patient discharged to home. Discharge instructions were reviewed with patient and understanding was verbalized. Patient's IV access was removed and she is waiting for her ride to arrive.

## 2025-03-19 NOTE — CARE PLAN
Problem: Pain - Adult  Goal: Verbalizes/displays adequate comfort level or baseline comfort level  Outcome: Progressing     Problem: Safety - Adult  Goal: Free from fall injury  Outcome: Progressing     Problem: Discharge Planning  Goal: Discharge to home or other facility with appropriate resources  Outcome: Progressing     Problem: Nutrition  Goal: Nutrient intake appropriate for maintaining nutritional needs  Outcome: Progressing       The patient's goals for the shift include      The clinical goals for the shift include patient will remain safe and free of fall and injury throughout the shift.

## 2025-03-19 NOTE — PROGRESS NOTES
"Betsy Sams is a 59 y.o. female on day 2 of admission presenting with Adrenal insufficiency (Multi).    Subjective   Patient has no complaints.  She is for discharge today.     I have reviewed histories, allergies and medications have been reviewed and there are no changes       Objective   Review of Systems   Gastrointestinal:  Negative for abdominal pain, nausea and vomiting.   Neurological:  Negative for dizziness.   Psychiatric/Behavioral:  Negative for confusion.      Physical Exam  Vitals and nursing note reviewed.   Constitutional:       General: She is not in acute distress.     Appearance: Normal appearance. She is normal weight.   HENT:      Head: Normocephalic and atraumatic.      Nose: Nose normal.      Mouth/Throat:      Mouth: Mucous membranes are moist.   Eyes:      Extraocular Movements: Extraocular movements intact.   Pulmonary:      Effort: Pulmonary effort is normal.   Musculoskeletal:         General: Normal range of motion.   Neurological:      Mental Status: She is alert and oriented to person, place, and time.   Psychiatric:         Mood and Affect: Mood normal.         Last Recorded Vitals  Blood pressure 124/67, pulse 67, temperature 36.3 °C (97.3 °F), temperature source Temporal, resp. rate 17, height 1.676 m (5' 6\"), weight 82.9 kg (182 lb 12.8 oz), SpO2 93%.  Intake/Output last 3 Shifts:  I/O last 3 completed shifts:  In: 3191.8 (38.5 mL/kg) [P.O.:542; I.V.:1049.8 (12.7 mL/kg); IV Piggyback:1600]  Out: 1225 (14.8 mL/kg) [Urine:1225 (0.4 mL/kg/hr)]  Weight: 82.9 kg     Relevant Results  Results from last 7 days   Lab Units 03/19/25  0630 03/18/25  2056 03/18/25  1546 03/18/25  1146 03/18/25  0748 03/18/25  0645 03/17/25  2105 03/17/25  1358 03/15/25  0613 03/15/25  0552   POCT GLUCOSE mg/dL 108* 190* 146* 182* 103*  --    < >  --    < >  --    GLUCOSE mg/dL  --   --   --   --   --  95  --  137*  --  114*    < > = values in this interval not displayed.     Scheduled " medications  aspirin, 81 mg, oral, Daily  atorvastatin, 20 mg, oral, Nightly  azithromycin, 500 mg, intravenous, q24h  cefTRIAXone, 2 g, intravenous, q24h  clopidogrel, 75 mg, oral, Daily  escitalopram, 5 mg, oral, Daily  fluticasone, 1 spray, Each Nostril, Daily  heparin (porcine), 5,000 Units, subcutaneous, q8h  hydrocortisone sodium succinate, 50 mg, intravenous, q8h  icosapent ethyL, 2 g, oral, BID  insulin lispro, 0-10 Units, subcutaneous, Before meals & nightly  levothyroxine, 137 mcg, oral, Daily  pantoprazole, 40 mg, oral, Daily before breakfast      Continuous medications     PRN medications  PRN medications: acetaminophen, albuterol, dextrose, dextrose, diphenhydrAMINE, glucagon, glucagon, ipratropium-albuteroL, morphine, ondansetron, traZODone    Results for orders placed or performed during the hospital encounter of 03/17/25 (from the past 24 hours)   POCT GLUCOSE   Result Value Ref Range    POCT Glucose 103 (H) 74 - 99 mg/dL   POCT GLUCOSE   Result Value Ref Range    POCT Glucose 182 (H) 74 - 99 mg/dL   POCT GLUCOSE   Result Value Ref Range    POCT Glucose 146 (H) 74 - 99 mg/dL   POCT GLUCOSE   Result Value Ref Range    POCT Glucose 190 (H) 74 - 99 mg/dL   POCT GLUCOSE   Result Value Ref Range    POCT Glucose 108 (H) 74 - 99 mg/dL     *Note: Due to a large number of results and/or encounters for the requested time period, some results have not been displayed. A complete set of results can be found in Results Review.      XR chest 1 view    Result Date: 3/17/2025  Interpreted By:  Los Sainz, STUDY: XR CHEST 1 VIEW;  3/17/2025 6:25 pm   INDICATION: Signs/Symptoms:SOB, hypoxia, leukocytosis; nausea/vomiting, r/o aspiration.   COMPARISON: 06/15/2022   ACCESSION NUMBER(S): [id number]   ORDERING CLINICIAN: SELWYN FONG   FINDINGS:     New patchy right-greater-than-left basilar airspace opacities. No pleural effusion or pneumothorax. Normal heart size. No acute osseous abnormality. Upper  abdomen is unremarkable.       1. New patchy right-greater-than-left basilar airspace opacities. Consider pneumonia.   Signed by: Los Sainz 3/17/2025 6:53 PM Dictation workstation:   [id number]    CT abdomen pelvis w IV contrast    Result Date: 3/17/2025  STUDY: CT Abdomen and Pelvis with IV Contrast; 03/17/2025, 3:27 PM INDICATION: Post hernia repair, persistent nausea and emesis and abdominal pain. COMPARISON: CT CAP 10/25/2024, CT AP 04/12/2024, 03/02/2021. ACCESSION NUMBER(S): [id number] ORDERING CLINICIAN: MARK ANTHONY TECHNIQUE: CT of the abdomen and pelvis was performed.  Contiguous axial images were obtained at 3 mm slice thickness through the abdomen and pelvis. Coronal and sagittal reconstructions at 3 mm slice thickness were performed.  Omnipaque 350, 75 mL was administered intravenously.  FINDINGS: There is bibasilar linear atelectasis.  No acute findings are seen within the liver.  The portal and hepatic veins are patent.  There is no intrahepatic ductal dilatation.  The gallbladder is moderately distended.  No acute findings are seen within the spleen.  The splenic vein is patent.  No inflammatory changes are seen surrounding the pancreas.  No adrenal nodule is noted.  No acute findings are seen within either kidney.  No dilated loops of small bowel or colon are noted.  The colon is fluid-filled, indicative of a diarrheal state. The appendix is not visualized.  No inflammatory changes are seen adjacent to the cecum.  There is mild anasarca.  Postoperative changes are seen at the umbilical region.  There is a small fluid collection in the umbilical region measured at 2.8 x 1.4 x 2.8 cm(Series 2, Image 88), most likely a postoperative seroma or hematoma.  Abscess cannot be excluded.  No enlarged lymph nodes are seen in the pelvic sidewalls, iliac chains, or retroperitoneum.  There is no evidence of aneurysmal dilatation of the abdominal aorta.  No prominent edema is seen within the psoas  musculature.  Degenerative changes are seen throughout the lumbar spine.    1.  Small fluid collection in the umbilical region, most likely postoperative seroma hematoma.  Abscess cannot be excluded. 2.  Generalized anasarca. 3.  Fluid-filled colonic loops, indicative of a diarrheal state. Signed by Tesfaye Gabriel MD    ECG 12 lead (Clinic Performed)    Result Date: 2/19/2025  Sinus rhythm Baseline wander in lead(s) V6 Confirmed by Carson Bob ([id number]) on 2/19/2025 4:38:27 PM    MR brain w and wo IV contrast    Result Date: 2/19/2025  Interpreted By:  Sherie Andrew, STUDY: MR BRAIN W AND WO IV CONTRAST;  2/19/2025 1:42 pm   INDICATION: Signs/Symptoms:Metastatic lung cancer to the brain s/p gamma knife. Surveillance imaging.   ,C34.90 Malignant neoplasm of unspecified part of unspecified bronchus or lung (Multi),C79.31 Secondary malignant neoplasm of brain (Multi)   COMPARISON: 10/16/2024   ACCESSION NUMBER(S): [id number]   ORDERING CLINICIAN: MYNOR SHAFFER   TECHNIQUE: Axial T2, FLAIR, DWI, gradient echo T2 and sagittal and coronal T1 weighted images of brain were acquired. Post contrast T1 weighted images were acquired after administration of 16 ML Dotarem gadolinium based intravenous contrast.   FINDINGS: Similar 0.4 cm focal enhancement within the posteromedial right occipital lobe with associated T2 and FLAIR signal abnormality and susceptibility artifact (image 95 series 8).   Similar 0.6 cm focus of enhancement within the right occipital bone (image 93 series 8).   Similar linear enhancement within the left ortega tg which may be vascular.   No new focus of enhancement.   CSF Spaces: The ventricles, sulci and basal cisterns are within normal limits. There is no extra-axial fluid collection.   Parenchyma: There is no diffusion restriction abnormality to suggest acute infarct.  There is no new focal parenchymal signal abnormality. There is no mass effect or midline shift. Cerebellar tonsils are at the level of  the foramen magnum. Pituitary and sella are not enlarged. No new abnormal susceptibility artifact.   Paranasal Sinuses and Mastoids: Visualized paranasal sinuses and mastoid air cells are predominantly clear. Major intracranial flow voids at the skull base are unremarkable..       Unchanged foci of enhancement within the right occipital lobe and right occipital bone compared to the prior exam 10/16/2024. No new focal enhancement.   MACRO: None   Signed by: Sherie Andrew 2/19/2025 3:18 PM Dictation workstation:   [id number]               Assessment/Plan   Assessment & Plan  Adrenal insufficiency (Multi)      IMPRESSION:  SECONDARY ADRENAL INSUFFICIENCY DUE TO IMMUNOTHERAPY   HYPOTHYROIDISM SECONDARY TO IMMUNOTHERAPY   No electrolyte derangements noted     RECOMMENDATIONS:  To decrease HC to 25mg IV every 8 hours   Patient can be discharged on her home regimen of Hydrocortisone 20-10mg twice daily   To continue Levothyroxine 137mcg po daily   Take levothyroxine on an empty stomach with water alone, 30-60 minutes before eating or taking other medications, 4 hours before any calcium or iron supplement.      Derian Hatch MD

## 2025-03-19 NOTE — DISCHARGE SUMMARY
DISCHARGE SUMMARY     Discharge Diagnosis  Adrenal insufficiency (Multi)    This discharge took greater than 35 minutes.    Test Results Pending At Discharge  Pending Labs       Order Current Status    Blood Culture Preliminary result    Blood Culture Preliminary result            Hospital Course   Betsy Sams is a 59 y.o. 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 Seo at [facility]), presenting with nausea, vomiting, abdominal pain, and inability to tolerate oral intake. Pt reports that she felt well on day of discharge from [facility] (03/15/25, Saturday). However, yesterday (Sunday) she woke with nausea, and developed bilious emesis after a few small sips of soup. She has not been able to keep anything down since then and has missed two days of her home Cortef, reporting that she was originally instructed to double her dose post-op but has in total only taken about a third of what she should have. She also feels a bit congested in her chest despite using the incentive spirometer as instructed. She reports the persistent abdominal pain from her surgery is exacerbated by the vomiting. She has not had a BM since Saturday after she was discharged but has also not had much to eat since then. She reports that her body temp fluctuates at baseline but she does not believe she has had any noticeable fevers or chills outside of her norm. Denies cp/pressure, palpitations, diaphoresis, SOB, VERDE, dizziness / lightheadedness, syncope or near syncope, HA, vision changes, sick contacts or exposures.      ED Course (Summary - please note all labs, imaging studies, and interventions noted below have been personally  reviewed and/or interpreted on day of admission):   Vitals on presentation: T 36.7 °C (98.1 °F)  HR (!) 101  BP 90/67  RR 20  O2 96 % Supplemental oxygen  Labs: CBC with WBC 12.5, Hgb 15.5, Plts 275. CMP with glucose 137, Na 137, K 4.3, BUN 15, sCr 1.11, alk phos 87, ALT 22, AST 22, bilirubin 1.0. Magnesium 1.69. Lactate 2.0.  EKG: Not completed in ED  Imaging: CT abd/pelvis with IV contrast - Small fluid collection in the umbilical region, most likely postoperative seroma hematoma.  Abscess cannot be excluded. Generalized anasarca. Fluid-filled colonic loops, indicative of a diarrheal state.   Interventions:   Meds - 1L NS, 4mg morphine, 4mg zofran, 100mg solucortef  Consults - ED spoke with patient's surgeon and they felt there was no need for transfer or surgical evaluation   Admitted to medicine      12-point ROS reviewed and found to be negative aside from aforementioned positives in HPI and/or noted in dedicated ROS section below.      3/18: She is much improved from admission. She was walking the halls just before I saw her.     Sepsis without shock 2/2 RLL PNA and UTI present on admission and on arrival to the ED  -SIRS criteria (2/4): WBCs, HR   -CXR w/ RLL > LLL PNA.   -UA concerning for infection, but no significant growth on cx  -Source: as above  -End-organ dysfunction: n/a  -Lactate 2.0  -BP has recovered with stress dose steroids   -Blood cultures NGTD  -Continue antibiotic coverage with cefdinir and azithromycin  -remains on RA    Adrenal insufficiency   -Patient with long-standing history of steroid use, +stress from recent surgery, +missed doses of steroids with n/v   -per endo resume home steroids on DC  -Endocrinology consult appreciated      Hypotension, multifactorial   -Likely related to adrenal insufficiency and missed steroids, mild dehydration; however cannot r/o contribution from infectious source  -Resolved, per endo resume home steroids on DC  -Endocrinology consult appreciated     "  Nausea and vomiting, abdominal pain, inability to tolerate PO   Mild dehydration clinically   -resolved     RLL adenocarcinoma s/p 2-year pembrolizumab (03/01/23 - EA 5163) with brain mets s/p GKRS  -Continue follow-up with oncology as scheduled      DM-II   -home meds     Hypothyroidism   -Continue home synthroid      Subclavian steal syndrome (L)   -Continue home DAPT     Depression   -Continue home therapies      GERD   -Continue PPI      Pertinent Physical Exam At Time of Discharge  Physical Exam    Home Medications     Medication List      START taking these medications     azithromycin 500 mg tablet; Commonly known as: Zithromax; Take 1 tablet   (500 mg) by mouth once daily for 1 dose. Take on the night of 3/19   cefdinir 300 mg capsule; Commonly known as: Omnicef; Take 1 capsule (300   mg) by mouth 2 times a day for 5 days. First dose evening of 3/19     CONTINUE taking these medications     acetaminophen 325 mg tablet; Commonly known as: Tylenol; Take 3 tablets   (975 mg) by mouth every 8 hours.   alcohol swabs pads, medicated   aspirin 81 mg chewable tablet   atorvastatin 20 mg tablet; Commonly known as: Lipitor; Take 1 tablet (20   mg) by mouth once daily.   BD Luer-Haily Syringe 3 mL 25 gauge x 1\" syringe; Generic drug: syringe   with needle   clopidogrel 75 mg tablet; Commonly known as: Plavix; Take 1 tablet (75   mg) by mouth once daily.   dexAMETHasone (PF) 10 mg/mL injection; Commonly known as: Decadron;   Inject 0.4 mL (4 mg) as directed 1 time if needed (once as needed if   unable to take PO hydrocortisone then go to the ED) for up to 1 dose.   dexAMETHasone 4 mg/mL injection; Commonly known as: Decadron   dulaglutide 1.5 mg/0.5 mL pen injector injection; Commonly known as:   Trulicity; Inject 1.5 mg under the skin 1 (one) time per week.   ergocalciferol 1250 mcg (50,000 units) capsule; Commonly known as:   Vitamin D-2; Take 1 capsule (50,000 Units) by mouth 1 (one) time per week.   escitalopram 5 " mg tablet; Commonly known as: Lexapro; Take 1 tablet (5   mg) by mouth once daily.   famotidine 20 mg tablet; Commonly known as: Pepcid; Take 1 tablet (20   mg) by mouth 2 times a day.   fluticasone 50 mcg/actuation nasal spray; Commonly known as: Flonase;   Administer 1 spray into each nostril once daily. Shake gently. Before   first use, prime pump. After use, clean tip and replace cap.   gabapentin 300 mg capsule; Commonly known as: Neurontin; Take 1 capsule   (300 mg) by mouth 3 times a day for 10 days.   hydrocortisone 10 mg tablet; Commonly known as: Cortef; 20mg in the   morning and 10mg in the afternoon, double the dose during sick days   levothyroxine 137 mcg tablet; Commonly known as: Synthroid, Levoxyl;   Take 1 tablet (137 mcg) by mouth early in the morning..   lidocaine HCl-hydrocortison ac 3-0.5 % cream cream; Commonly known as:   Radiaura; Apply 1 Application topically 2 times a day.   metFORMIN 500 mg tablet; Commonly known as: Glucophage; Take 1 tablet   (500 mg) by mouth 2 times daily (morning and late afternoon). Take with   food.   Microlet Lancet misc; Generic drug: lancets   ondansetron ODT 4 mg disintegrating tablet; Commonly known as:   Zofran-ODT; Dissolve 1 tablet (4 mg) in the mouth every 8 hours if needed   for nausea or vomiting.   OneTouch Ultra Test strip; Generic drug: blood sugar diagnostic; Bg   check daily   pantoprazole 40 mg EC tablet; Commonly known as: ProtoNix; Take 1 tablet   (40 mg) by mouth once daily in the morning. Take before meals. 30 MINUTES   PRIOR TO BREAKFAST   traZODone 50 mg tablet; Commonly known as: Desyrel; Take 1 tablet (50   mg) by mouth as needed at bedtime for sleep.   Vascepa 1 gram capsule; Generic drug: icosapent ethyL; Take 2 capsules   (2 g) by mouth 2 times daily (morning and late afternoon).     STOP taking these medications     albuterol 90 mcg/actuation inhaler; Commonly known as: Ventolin HFA       Outpatient Follow-Up  No follow-ups on file.      Kumar Hernandez MD PhD  3/19/2025  9:43 AM

## 2025-03-19 NOTE — CARE PLAN
The patient's goals for the shift include      The clinical goals for the shift include pt will be HDS throughout shift.    Over the shift, the patient did make progress toward the following goals.

## 2025-03-20 ENCOUNTER — PATIENT OUTREACH (OUTPATIENT)
Dept: PRIMARY CARE | Facility: CLINIC | Age: 60
End: 2025-03-20
Payer: COMMERCIAL

## 2025-03-20 NOTE — PROGRESS NOTES
Discharge Facility: [facility]  Discharge Diagnosis:Adrenal insuffiencey  Admission Date:3/17/25  Discharge Date: 3/19/25    PCP Appointment Date:No contact made. Message sent to office.  Specialist Appointment Date: General Surgery 3/25/25, Vascular US 3/26/25, Vascular 3/26/25, Gastroenterology 4/11/25, CT Abdomen 4/18/25, CT Chest 4/18/25, Hem/Onc 4/23/25, Endocrinology 5/8/25  Hospital Encounter and Summary Linked: Yes    ED to Hosp-Admission (Discharged) with Kumar Hernandez MD PhD; Kelly Koch DO; Jose Cedeno MD (03/17/2025)   Discharge Summary by Kumar Hernandez MD PhD (03/19/2025 09:43)     2 call attempts made

## 2025-03-22 LAB
BACTERIA BLD CULT: NORMAL
BACTERIA BLD CULT: NORMAL

## 2025-03-24 ENCOUNTER — APPOINTMENT (OUTPATIENT)
Dept: SURGERY | Facility: CLINIC | Age: 60
End: 2025-03-24
Payer: COMMERCIAL
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
## 2025-03-25 ENCOUNTER — APPOINTMENT (OUTPATIENT)
Dept: SURGERY | Facility: CLINIC | Age: 60
End: 2025-03-25
Payer: COMMERCIAL

## 2025-03-25 ENCOUNTER — OFFICE VISIT (OUTPATIENT)
Dept: SURGERY | Facility: CLINIC | Age: 60
End: 2025-03-25
Payer: COMMERCIAL

## 2025-03-25 VITALS
WEIGHT: 179 LBS | BODY MASS INDEX: 28.77 KG/M2 | HEART RATE: 69 BPM | SYSTOLIC BLOOD PRESSURE: 146 MMHG | TEMPERATURE: 97.8 F | RESPIRATION RATE: 18 BRPM | DIASTOLIC BLOOD PRESSURE: 83 MMHG | OXYGEN SATURATION: 98 % | HEIGHT: 66 IN

## 2025-03-25 DIAGNOSIS — K43.0 INCARCERATED INCISIONAL HERNIA: Primary | ICD-10-CM

## 2025-03-25 PROCEDURE — 3008F BODY MASS INDEX DOCD: CPT | Performed by: SURGERY

## 2025-03-25 PROCEDURE — 99024 POSTOP FOLLOW-UP VISIT: CPT | Performed by: SURGERY

## 2025-03-25 PROCEDURE — 3077F SYST BP >= 140 MM HG: CPT | Performed by: SURGERY

## 2025-03-25 PROCEDURE — 3079F DIAST BP 80-89 MM HG: CPT | Performed by: SURGERY

## 2025-03-26 ENCOUNTER — HOSPITAL ENCOUNTER (OUTPATIENT)
Dept: VASCULAR MEDICINE | Facility: CLINIC | Age: 60
Discharge: HOME | End: 2025-03-26
Payer: COMMERCIAL

## 2025-03-26 ENCOUNTER — OFFICE VISIT (OUTPATIENT)
Dept: VASCULAR SURGERY | Facility: CLINIC | Age: 60
End: 2025-03-26
Payer: COMMERCIAL

## 2025-03-26 VITALS
HEART RATE: 74 BPM | SYSTOLIC BLOOD PRESSURE: 117 MMHG | DIASTOLIC BLOOD PRESSURE: 67 MMHG | WEIGHT: 178 LBS | BODY MASS INDEX: 28.73 KG/M2

## 2025-03-26 DIAGNOSIS — R09.89 OTHER SPECIFIED SYMPTOMS AND SIGNS INVOLVING THE CIRCULATORY AND RESPIRATORY SYSTEMS: ICD-10-CM

## 2025-03-26 DIAGNOSIS — I73.9 PAD (PERIPHERAL ARTERY DISEASE) (CMS-HCC): ICD-10-CM

## 2025-03-26 DIAGNOSIS — G45.8 SUBCLAVIAN STEAL SYNDROME: Primary | ICD-10-CM

## 2025-03-26 PROCEDURE — 99214 OFFICE O/P EST MOD 30 MIN: CPT | Mod: 25 | Performed by: SURGERY

## 2025-03-26 PROCEDURE — 93880 EXTRACRANIAL BILAT STUDY: CPT

## 2025-03-26 PROCEDURE — 3078F DIAST BP <80 MM HG: CPT | Performed by: SURGERY

## 2025-03-26 PROCEDURE — 3074F SYST BP LT 130 MM HG: CPT | Performed by: SURGERY

## 2025-03-26 PROCEDURE — 93880 EXTRACRANIAL BILAT STUDY: CPT | Performed by: STUDENT IN AN ORGANIZED HEALTH CARE EDUCATION/TRAINING PROGRAM

## 2025-03-26 PROCEDURE — 99214 OFFICE O/P EST MOD 30 MIN: CPT | Performed by: SURGERY

## 2025-03-26 NOTE — PROGRESS NOTES
Primary Care Physician: Bahman Montiel [phone]     History Of Present Illness  Betsy Sams is a 59 y.o. female who presents in follow-up of left carotid to subclavian artery bypass grafting from Jan 2020 for steal syndrome. She is asymptomatic. She is on ASA and Plavix. She does not smoke.     Past Medical History    Surgical History  Past Surgical History:   Procedure Laterality Date    APPENDECTOMY  12/14/2017    Appendectomy    CT ANGIO NECK  1/6/2020    CT NECK ANGIO W AND WO IV CONTRAST 1/6/2020 [facility]    CT GUIDED PERCUTANEOUS BIOPSY LUNG  12/7/2020    CT GUIDED PERCUTANEOUS BIOPSY LUNG 12/7/2020 [facility]    HYSTERECTOMY  12/14/2017    Hysterectomy    OTHER SURGICAL HISTORY  02/19/2020    Aorta to subclavian and carotid arterial bypass    TONSILLECTOMY  12/14/2017    Tonsillectomy        Social History  She reports that she has quit smoking. Her smoking use included cigarettes. She has a 40 pack-year smoking history. She has never used smokeless tobacco. She reports that she does not currently use alcohol. She reports that she does not use drugs.    Family History  Family History   Problem Relation Name Age of Onset    Other (Cardiac Disorder) Mother Adela sams     Diabetes Mother Adela sams     Kidney disease Mother Adela sams     Breast cancer Mother Adela sams     Other (Peripheral Arterial Disease) Mother Adela sams     Colon polyps Mother Adela sams     Other (Peripheral Arterial Disease) Sister      Hyperlipidemia Other Sibling         Allergies  Naproxen    Review of Systems   Non cont    Medications    Current Outpatient Medications:     acetaminophen (Tylenol) 325 mg tablet, Take 3 tablets (975 mg) by mouth every 8 hours., Disp: , Rfl:     alcohol swabs pads, medicated, use as directed once daily, Disp: , Rfl:     aspirin 81 mg chewable tablet, Chew 1 tablet (81 mg) once daily., Disp: , Rfl:     atorvastatin (Lipitor) 20 mg tablet,  "Take 1 tablet (20 mg) by mouth once daily., Disp: 90 tablet, Rfl: 1    BD Luer-Haily Syringe 3 mL 25 gauge x 1\" syringe, use as directed, Disp: , Rfl:     clopidogrel (Plavix) 75 mg tablet, Take 1 tablet (75 mg) by mouth once daily., Disp: 90 tablet, Rfl: 1    dexAMETHasone 4 mg/mL injection, inject 1 MILLILITER (4 MG) FOR 1 DOSE IN CASE OF EMERGENCY, Disp: , Rfl:     dexAMETHasone, PF, (Decadron) 10 mg/mL injection, Inject 0.4 mL (4 mg) as directed 1 time if needed (once as needed if unable to take PO hydrocortisone then go to the ED) for up to 1 dose., Disp: 1 mL, Rfl: 1    dulaglutide (Trulicity) 1.5 mg/0.5 mL pen injector injection, Inject 1.5 mg under the skin 1 (one) time per week., Disp: 4 each, Rfl: 11    ergocalciferol (Vitamin D-2) 1.25 MG (73807 UT) capsule, Take 1 capsule (50,000 Units) by mouth 1 (one) time per week., Disp: 12 capsule, Rfl: 1    escitalopram (Lexapro) 5 mg tablet, Take 1 tablet (5 mg) by mouth once daily., Disp: 90 tablet, Rfl: 1    fluticasone (Flonase) 50 mcg/actuation nasal spray, Administer 1 spray into each nostril once daily. Shake gently. Before first use, prime pump. After use, clean tip and replace cap., Disp: 16 g, Rfl: 0    hydrocortisone (Cortef) 10 mg tablet, 20mg in the morning and 10mg in the afternoon, double the dose during sick days, Disp: 350 tablet, Rfl: 3    levothyroxine (Synthroid, Levoxyl) 137 mcg tablet, Take 1 tablet (137 mcg) by mouth early in the morning.., Disp: 90 tablet, Rfl: 3    lidocaine HCl-hydrocortison ac (Radiaura) 3-0.5 % cream cream, Apply 1 Application topically 2 times a day., Disp: 28.3 g, Rfl: 0    metFORMIN (Glucophage) 500 mg tablet, Take 1 tablet (500 mg) by mouth 2 times daily (morning and late afternoon). Take with food., Disp: 180 tablet, Rfl: 1    Microlet Lancet misc, use 1 LANCET to TEST BLOOD SUGAR once daily as directed, Disp: , Rfl:     ondansetron ODT (Zofran-ODT) 4 mg disintegrating tablet, Dissolve 1 tablet (4 mg) in the mouth " every 8 hours if needed for nausea or vomiting., Disp: 30 tablet, Rfl: 0    OneTouch Ultra Test strip, Bg check daily, Disp: 100 strip, Rfl: 3    pantoprazole (ProtoNix) 40 mg EC tablet, Take 1 tablet (40 mg) by mouth once daily in the morning. Take before meals. 30 MINUTES PRIOR TO BREAKFAST, Disp: 90 tablet, Rfl: 1    Vascepa 1 gram capsule, Take 2 capsules (2 g) by mouth 2 times daily (morning and late afternoon)., Disp: 360 capsule, Rfl: 3    famotidine (Pepcid) 20 mg tablet, Take 1 tablet (20 mg) by mouth 2 times a day., Disp: 60 tablet, Rfl: 5    gabapentin (Neurontin) 300 mg capsule, Take 1 capsule (300 mg) by mouth 3 times a day for 10 days., Disp: 30 capsule, Rfl: 0    traZODone (Desyrel) 50 mg tablet, Take 1 tablet (50 mg) by mouth as needed at bedtime for sleep., Disp: 90 tablet, Rfl: 1     Physical Exam  Gen: alert and awake. Oriented to time, place and person. In no acute distress.  HEENT: normocephalic, sclera non icteric. EOMI. Supple without lymphadenopathy  Cor: RRR  Lung: unlabored breathing  Abd: soft, non tender and non distended.  No evidence of hepatosplenomegaly  Ext: warm and well perfused   Psych: Normal    Last Recorded Vitals  Vitals:    03/26/25 1324   BP: 117/67   BP Location: Left arm   Patient Position: Sitting   BP Cuff Size: Adult   Pulse: 74   Weight: 80.7 kg (178 lb)        Relevant Results  Duplex today shows widely patent bypass graft     Assessment/Plan   S/p carotid subclavian bypass: widely patent    RTC one year with duplex.    Jorge Alberto Rodriguez MD  Professor & Chief, Division of Vascular Surgery & Endovascular Therapy  Co-Director, [facility]  [facility] [place]  [facility] [place]

## 2025-03-27 DIAGNOSIS — N18.30 TYPE 2 DIABETES MELLITUS WITH STAGE 3 CHRONIC KIDNEY DISEASE, WITHOUT LONG-TERM CURRENT USE OF INSULIN, UNSPECIFIED WHETHER STAGE 3A OR 3B CKD (MULTI): Primary | ICD-10-CM

## 2025-03-27 DIAGNOSIS — E11.22 TYPE 2 DIABETES MELLITUS WITH STAGE 3 CHRONIC KIDNEY DISEASE, WITHOUT LONG-TERM CURRENT USE OF INSULIN, UNSPECIFIED WHETHER STAGE 3A OR 3B CKD (MULTI): Primary | ICD-10-CM

## 2025-03-27 LAB
LABORATORY COMMENT REPORT: NORMAL
PATH REPORT.FINAL DX SPEC: NORMAL
PATH REPORT.GROSS SPEC: NORMAL
PATH REPORT.RELEVANT HX SPEC: NORMAL
PATH REPORT.TOTAL CANCER: NORMAL

## 2025-03-31 ENCOUNTER — APPOINTMENT (OUTPATIENT)
Dept: PRIMARY CARE | Facility: CLINIC | Age: 60
End: 2025-03-31
Payer: COMMERCIAL

## 2025-03-31 VITALS
SYSTOLIC BLOOD PRESSURE: 109 MMHG | OXYGEN SATURATION: 96 % | BODY MASS INDEX: 27.92 KG/M2 | TEMPERATURE: 97.8 F | WEIGHT: 173 LBS | HEART RATE: 72 BPM | DIASTOLIC BLOOD PRESSURE: 71 MMHG

## 2025-03-31 DIAGNOSIS — E27.40 ADRENAL INSUFFICIENCY (MULTI): ICD-10-CM

## 2025-03-31 DIAGNOSIS — K43.2 INCISIONAL HERNIA WITHOUT OBSTRUCTION OR GANGRENE: ICD-10-CM

## 2025-03-31 DIAGNOSIS — K43.9 VENTRAL HERNIA WITHOUT OBSTRUCTION OR GANGRENE: ICD-10-CM

## 2025-03-31 DIAGNOSIS — Z09 HOSPITAL DISCHARGE FOLLOW-UP: Primary | ICD-10-CM

## 2025-03-31 DIAGNOSIS — N18.31 STAGE 3A CHRONIC KIDNEY DISEASE (MULTI): ICD-10-CM

## 2025-03-31 PROBLEM — J96.00 ACUTE RESPIRATORY FAILURE: Status: RESOLVED | Noted: 2024-08-30 | Resolved: 2025-03-31

## 2025-03-31 PROBLEM — J96.21 ACUTE AND CHRONIC RESPIRATORY FAILURE WITH HYPOXIA: Status: RESOLVED | Noted: 2023-08-10 | Resolved: 2025-03-31

## 2025-03-31 PROCEDURE — 3074F SYST BP LT 130 MM HG: CPT | Performed by: STUDENT IN AN ORGANIZED HEALTH CARE EDUCATION/TRAINING PROGRAM

## 2025-03-31 PROCEDURE — 3078F DIAST BP <80 MM HG: CPT | Performed by: STUDENT IN AN ORGANIZED HEALTH CARE EDUCATION/TRAINING PROGRAM

## 2025-03-31 PROCEDURE — 99495 TRANSJ CARE MGMT MOD F2F 14D: CPT | Performed by: STUDENT IN AN ORGANIZED HEALTH CARE EDUCATION/TRAINING PROGRAM

## 2025-03-31 PROCEDURE — 1036F TOBACCO NON-USER: CPT | Performed by: STUDENT IN AN ORGANIZED HEALTH CARE EDUCATION/TRAINING PROGRAM

## 2025-03-31 ASSESSMENT — ENCOUNTER SYMPTOMS
COUGH: 0
VOMITING: 0
FEVER: 0
SHORTNESS OF BREATH: 0
DIARRHEA: 0
LIGHT-HEADEDNESS: 0
CHILLS: 0
FATIGUE: 0
HEADACHES: 0
RHINORRHEA: 0
CONSTIPATION: 0
NAUSEA: 0
ABDOMINAL PAIN: 1
DIZZINESS: 0

## 2025-03-31 NOTE — PROGRESS NOTES
Subjective   Patient ID: Betsy Sams is a 59 y.o. female who presents for Hospital Follow-up.    HPI     Hospital discharge follow-up  Discharge Facility: [facility]  Discharge Diagnosis:Adrenal insuffiencey  Admission Date:3/17/25  Discharge Date: 3/19/25    Hospital course: Patient presented to the emergency department with increasing nausea, vomiting, abdominal pain and inability to tolerate oral intake.  She just had an open conversion due to significant adhesions for an incisional hernia repair.  She felt fine when she was discharged on 3/15/2025.  However 1 day prior to presentation she woke up with nausea and developed emesis with only a couple sips of soup and has not been able to keep anything down since.  Patient was noted to be tachycardic in the emergency department.  She did have a small fluid collection in the umbilical region most likely postoperative seroma hematoma.  However an abscess could not be excluded.  She also had a chest x-ray which was concerning for possible right lower lobe as well as left lower lobe pneumonia.  Patient was treated and admitted for sepsis for the pneumonia and a possible UTI on admission and on arrival to the emergency department.  Patient was also having issues with adrenal insufficiency and she did miss some doses of her steroids because she had not been able to take anything by mouth.  Patient received antibiotics and additional lab work in the hospital.  Blood cultures were negative and she was stable on room air.  Endocrinology was consulted and they resumed her home steroid dose.  Blood pressure and other symptoms resolved.  Patient is discharged home in stable condition.    Overall doing well.  She denies any acute concerns or complaints today.  She has already had her follow-up with a general surgeon and they do not have any additional concerns for her.  She states that the incision is doing well and she denies any redness or any pain or tenderness or  discharge around the incision site.  She also has had follow-up with her vascular surgeon as well.  She is continuing with her current regimen of medications.  She would like to continue with regular follow-up.      Discharge Facility: [facility]  Discharge Diagnosis:Adrenal insuffiencey  Admission Date:3/17/25  Discharge Date: 3/19/25     PCP Appointment Date:No contact made. Message sent to office.  Specialist Appointment Date: General Surgery 3/25/25, Vascular US 3/26/25, Vascular 3/26/25, Gastroenterology 4/11/25, CT Abdomen 4/18/25, CT Chest 4/18/25, Hem/Onc 4/23/25, Endocrinology 5/8/25  Hospital Encounter and Summary Linked: Yes     ED to Hosp-Admission (Discharged) with Kumar Hernandez MD PhD; Kelly Koch DO; Jose Cedeno MD (03/17/2025)   Discharge Summary by Kumar Hernandez MD PhD (03/19/2025 09:43)      2 call attempts made       Review of Systems   Constitutional:  Negative for chills, fatigue and fever.   HENT:  Negative for congestion and rhinorrhea.    Respiratory:  Negative for cough and shortness of breath.    Cardiovascular:  Negative for chest pain.   Gastrointestinal:  Positive for abdominal pain. Negative for constipation, diarrhea, nausea and vomiting.   Neurological:  Negative for dizziness, light-headedness and headaches.       Objective   /71   Pulse 72   Temp 36.6 °C (97.8 °F)   Wt 78.5 kg (173 lb)   SpO2 96%   BMI 27.92 kg/m²     Physical Exam  Vitals and nursing note reviewed.   Constitutional:       General: She is not in acute distress.     Appearance: Normal appearance. She is normal weight. She is not ill-appearing or toxic-appearing.   HENT:      Head: Normocephalic and atraumatic.   Cardiovascular:      Rate and Rhythm: Normal rate and regular rhythm.      Heart sounds: Normal heart sounds.   Pulmonary:      Effort: Pulmonary effort is normal.      Breath sounds: Normal breath sounds.   Abdominal:      General: Bowel sounds are normal. There is no distension.       Palpations: Abdomen is soft.      Tenderness: There is abdominal tenderness. There is no right CVA tenderness, left CVA tenderness, guarding or rebound.      Hernia: No hernia is present.   Skin:     General: Skin is warm and dry.      Findings: No erythema or lesion.      Comments: Healing abdominal incision after recent hernia repair   Neurological:      Mental Status: She is alert.         Assessment/Plan   Problem List Items Addressed This Visit             ICD-10-CM    Adrenal insufficiency (Multi) E27.40    Relevant Orders    CBC and Auto Differential    Comprehensive Metabolic Panel    Stage 3a chronic kidney disease (Multi) N18.31    Ventral hernia without obstruction or gangrene K43.9     Repaired on 3/14/2025 with Dr. Seo with .         Incisional hernia without obstruction or gangrene K43.2     Repaired on 3/14/2025 with Dr. Seo with .          Other Visit Diagnoses         Codes    Hospital discharge follow-up    -  Primary Z09    Relevant Orders    CBC and Auto Differential    Comprehensive Metabolic Panel          History and physical examination as above.  Patient coming in for hospital follow-up.  She had successfully completed surgery for an incisional hernia with general surgery and was discharged home.  She then developed profuse vomiting and nausea and was unable to tolerate any medications including her steroids for her chronic adrenal insufficiency.  She then was admitted for adrenal insufficiency to the hospital and has recovered since that time.  Further blood work orders placed for the patient and we will plan on following up on the results.  She will continue with regular follow-up with a general surgeon as well as her vascular surgeon and other specialists as well.  She will call for any medication refills.  She will follow-up sooner with any other acute concerns or complaints.

## 2025-04-03 ENCOUNTER — PATIENT OUTREACH (OUTPATIENT)
Dept: PRIMARY CARE | Facility: CLINIC | Age: 60
End: 2025-04-03
Payer: COMMERCIAL

## 2025-04-03 DIAGNOSIS — E27.40 ADRENAL INSUFFICIENCY (MULTI): Primary | ICD-10-CM

## 2025-04-03 RX ORDER — METHYLPREDNISOLONE SODIUM SUCCINATE 40 MG/ML
INJECTION INTRAMUSCULAR; INTRAVENOUS
Qty: 1 EACH | Refills: 3 | Status: SHIPPED | OUTPATIENT
Start: 2025-04-03

## 2025-04-03 RX ORDER — SYRINGE WITH NEEDLE, 1 ML 25GX5/8"
SYRINGE, EMPTY DISPOSABLE MISCELLANEOUS
Qty: 2 EACH | Refills: 3 | Status: SHIPPED | OUTPATIENT
Start: 2025-04-03

## 2025-04-03 NOTE — PROGRESS NOTES
Call regarding appt. with PCP on 3/31/25 after hospitalization.  At time of outreach call the patient feels as if their condition has improved since last visit.  Reviewed the PCP appointment with the pt and addressed any questions or concerns.

## 2025-04-04 DIAGNOSIS — E53.8 VITAMIN B12 DEFICIENCY: ICD-10-CM

## 2025-04-04 RX ORDER — LANOLIN ALCOHOL/MO/W.PET/CERES
1000 CREAM (GRAM) TOPICAL DAILY
Qty: 90 TABLET | Refills: 0 | Status: SHIPPED | OUTPATIENT
Start: 2025-04-04

## 2025-04-05 DIAGNOSIS — E27.40 ADRENAL INSUFFICIENCY (MULTI): ICD-10-CM

## 2025-04-08 DIAGNOSIS — E27.40 ADRENAL INSUFFICIENCY (MULTI): ICD-10-CM

## 2025-04-08 RX ORDER — SYRINGE WITH NEEDLE, 1 ML 27GX1/2"
SYRINGE, EMPTY DISPOSABLE MISCELLANEOUS
Qty: 3 EACH | Refills: 3 | Status: SHIPPED | OUTPATIENT
Start: 2025-04-08

## 2025-04-08 RX ORDER — METHYLPREDNISOLONE SODIUM SUCCINATE 40 MG/ML
INJECTION INTRAMUSCULAR; INTRAVENOUS
Qty: 1 EACH | Refills: 3 | Status: SHIPPED | OUTPATIENT
Start: 2025-04-08

## 2025-04-09 LAB
ALBUMIN SERPL-MCNC: 4 G/DL (ref 3.6–5.1)
ALP SERPL-CCNC: 82 U/L (ref 37–153)
ALT SERPL-CCNC: 15 U/L (ref 6–29)
ANION GAP SERPL CALCULATED.4IONS-SCNC: 6 MMOL/L (CALC) (ref 7–17)
AST SERPL-CCNC: 13 U/L (ref 10–35)
BASOPHILS # BLD AUTO: 50 CELLS/UL (ref 0–200)
BASOPHILS NFR BLD AUTO: 0.8 %
BILIRUB SERPL-MCNC: 0.5 MG/DL (ref 0.2–1.2)
BUN SERPL-MCNC: 16 MG/DL (ref 7–25)
CALCIUM SERPL-MCNC: 9.5 MG/DL (ref 8.6–10.4)
CHLORIDE SERPL-SCNC: 105 MMOL/L (ref 98–110)
CHOLEST SERPL-MCNC: 141 MG/DL
CHOLEST/HDLC SERPL: 3.4 (CALC)
CO2 SERPL-SCNC: 29 MMOL/L (ref 20–32)
CREAT SERPL-MCNC: 0.98 MG/DL (ref 0.5–1.05)
EGFRCR SERPLBLD CKD-EPI 2021: 66 ML/MIN/1.73M2
EOSINOPHIL # BLD AUTO: 329 CELLS/UL (ref 15–500)
EOSINOPHIL NFR BLD AUTO: 5.3 %
ERYTHROCYTE [DISTWIDTH] IN BLOOD BY AUTOMATED COUNT: 12.2 % (ref 11–15)
EST. AVERAGE GLUCOSE BLD GHB EST-MCNC: 117 MG/DL
EST. AVERAGE GLUCOSE BLD GHB EST-SCNC: 6.5 MMOL/L
GLUCOSE SERPL-MCNC: 83 MG/DL (ref 65–99)
HBA1C MFR BLD: 5.7 % OF TOTAL HGB
HCT VFR BLD AUTO: 39.8 % (ref 35–45)
HDLC SERPL-MCNC: 41 MG/DL
HGB BLD-MCNC: 13.1 G/DL (ref 11.7–15.5)
LDLC SERPL CALC-MCNC: 70 MG/DL (CALC)
LYMPHOCYTES # BLD AUTO: 3193 CELLS/UL (ref 850–3900)
LYMPHOCYTES NFR BLD AUTO: 51.5 %
MCH RBC QN AUTO: 29.7 PG (ref 27–33)
MCHC RBC AUTO-ENTMCNC: 32.9 G/DL (ref 32–36)
MCV RBC AUTO: 90.2 FL (ref 80–100)
MONOCYTES # BLD AUTO: 409 CELLS/UL (ref 200–950)
MONOCYTES NFR BLD AUTO: 6.6 %
NEUTROPHILS # BLD AUTO: 2220 CELLS/UL (ref 1500–7800)
NEUTROPHILS NFR BLD AUTO: 35.8 %
NONHDLC SERPL-MCNC: 100 MG/DL (CALC)
PLATELET # BLD AUTO: 272 THOUSAND/UL (ref 140–400)
PMV BLD REES-ECKER: 10.8 FL (ref 7.5–12.5)
POTASSIUM SERPL-SCNC: 4.4 MMOL/L (ref 3.5–5.3)
PROT SERPL-MCNC: 6.7 G/DL (ref 6.1–8.1)
RBC # BLD AUTO: 4.41 MILLION/UL (ref 3.8–5.1)
SODIUM SERPL-SCNC: 140 MMOL/L (ref 135–146)
T4 FREE SERPL-MCNC: 1.5 NG/DL (ref 0.8–1.8)
TRIGL SERPL-MCNC: 204 MG/DL
TSH SERPL-ACNC: 1.38 MIU/L (ref 0.4–4.5)
WBC # BLD AUTO: 6.2 THOUSAND/UL (ref 3.8–10.8)

## 2025-04-09 RX ORDER — DEXAMETHASONE SODIUM PHOSPHATE 10 MG/ML
INJECTION, SOLUTION INTRA-ARTICULAR; INTRALESIONAL; INTRAMUSCULAR; INTRAVENOUS; SOFT TISSUE
Qty: 1 ML | Refills: 0 | OUTPATIENT
Start: 2025-04-09

## 2025-04-10 ENCOUNTER — PHARMACY VISIT (OUTPATIENT)
Dept: PHARMACY | Facility: CLINIC | Age: 60
End: 2025-04-10
Payer: COMMERCIAL

## 2025-04-10 DIAGNOSIS — E27.40 ADRENAL INSUFFICIENCY (MULTI): Primary | ICD-10-CM

## 2025-04-10 RX ORDER — WATER 1000 ML/1000ML
INJECTION, SOLUTION INTRAVENOUS
Qty: 10 ML | Refills: 3 | Status: SHIPPED | OUTPATIENT
Start: 2025-04-10

## 2025-04-11 ENCOUNTER — APPOINTMENT (OUTPATIENT)
Facility: CLINIC | Age: 60
End: 2025-04-11
Payer: COMMERCIAL

## 2025-04-11 PROCEDURE — RXMED WILLOW AMBULATORY MEDICATION CHARGE

## 2025-04-12 ENCOUNTER — PHARMACY VISIT (OUTPATIENT)
Dept: PHARMACY | Facility: CLINIC | Age: 60
End: 2025-04-12
Payer: COMMERCIAL

## 2025-04-18 ENCOUNTER — HOSPITAL ENCOUNTER (OUTPATIENT)
Dept: RADIOLOGY | Facility: CLINIC | Age: 60
Discharge: HOME | End: 2025-04-18
Payer: COMMERCIAL

## 2025-04-18 ENCOUNTER — APPOINTMENT (OUTPATIENT)
Facility: CLINIC | Age: 60
End: 2025-04-18
Payer: COMMERCIAL

## 2025-04-18 DIAGNOSIS — C34.90 ADENOCARCINOMA OF LUNG, UNSPECIFIED LATERALITY (MULTI): ICD-10-CM

## 2025-04-18 PROCEDURE — 2550000001 HC RX 255 CONTRASTS: Mod: JZ | Performed by: STUDENT IN AN ORGANIZED HEALTH CARE EDUCATION/TRAINING PROGRAM

## 2025-04-18 PROCEDURE — 74160 CT ABDOMEN W/CONTRAST: CPT

## 2025-04-18 PROCEDURE — 71260 CT THORAX DX C+: CPT

## 2025-04-18 RX ADMIN — IOHEXOL 75 ML: 350 INJECTION, SOLUTION INTRAVENOUS at 12:47

## 2025-04-23 ENCOUNTER — OFFICE VISIT (OUTPATIENT)
Dept: HEMATOLOGY/ONCOLOGY | Facility: CLINIC | Age: 60
End: 2025-04-23
Payer: COMMERCIAL

## 2025-04-23 VITALS
OXYGEN SATURATION: 98 % | BODY MASS INDEX: 28.25 KG/M2 | HEART RATE: 76 BPM | SYSTOLIC BLOOD PRESSURE: 116 MMHG | DIASTOLIC BLOOD PRESSURE: 79 MMHG | TEMPERATURE: 97.2 F | RESPIRATION RATE: 20 BRPM | WEIGHT: 175 LBS

## 2025-04-23 DIAGNOSIS — C34.91: Primary | ICD-10-CM

## 2025-04-23 PROCEDURE — 3074F SYST BP LT 130 MM HG: CPT | Performed by: NURSE PRACTITIONER

## 2025-04-23 PROCEDURE — 3078F DIAST BP <80 MM HG: CPT | Performed by: NURSE PRACTITIONER

## 2025-04-23 PROCEDURE — 99213 OFFICE O/P EST LOW 20 MIN: CPT | Performed by: NURSE PRACTITIONER

## 2025-04-23 PROCEDURE — 1036F TOBACCO NON-USER: CPT | Performed by: NURSE PRACTITIONER

## 2025-04-23 ASSESSMENT — PAIN SCALES - GENERAL: PAINLEVEL_OUTOF10: 0-NO PAIN

## 2025-04-23 NOTE — PROGRESS NOTES
Patient ID: Betsy Sams is a 58 y.o. female    Primary Care Provider: Bahman Montiel DO    DIAGNOSIS AND STAGING  Stage HEAVEN ([id number]) NSCLC (adenocarcinoma with lepidic growth) of the RLL - dx on 12/9/2020     SITES OF DISEASE  Right lower lobe  Brain (right posterior occipital metastasis)  Concern for progression in omentum, status post biopsy on 12/14/2022 with no malignancy identified, chronic inflammatory changes noted    MOLECULAR GENOMICS  MICROSATELLITE STATUS: Microsatellite Stable (PELON)     DISEASE ASSOCIATED GENOMIC FINDINGS: None     DISEASE RELEVANT ALTERATIONS NOT DETECTED:  Negative for ALK fusion.  Negative for BRAF V600E.  Negative for EGFR (sensitizing) mutation.  Negative for NTRK fusion.  Negative for ROS1 fusion.  Negative for RET fusion.  Negative for MET exon 14 skipping mutation.     PD-L1 TPS 75%     PRIOR THERAPIES  GKRS to the right posterior occipital metastasis on 01/20/21 (20 Gy)  Single agent pembrolizumab under [id number] begins 03/10/21; last dose on 03/01/2023     CURRENT THERAPY  Observation    CURRENT ONCOLOGICAL PROBLEMS  Immune related adrenal insufficiency     HISTORY OF PRESENT ILLNESS  She initially presented to her PCP with hemoptysis that began in October, after which CT chest w/o contrast was taken on 10/30/2020 and showed a RLL mass of 4.4 x 2.8 x 3.1 cm with mass effect on regional bronchi. Scan positive also for multiple enlarged  ipsilateral mediastinal and paratracheal lymph nodes, largest subcarinal measuring 1.8 cm. PET/CT additionally showing SUV max of 20.1 in RLL mass, SUV max of 11.7 in  ipsilateral paratracheal and subcarinal LNs 2R and 4R. MRI brain done on  1/5/2020 showed a 12 mm enhancing lesion in posterior right occipital lobe with surrounding vasogenic edema.     CT guided biopsy of lung mass on 12/07/2020 confirmed adenocarcinoma. LN biopsy on 12/18/2020 showed node 4R and 7 were positive with malignant cells from  adenocarcinoma. 4L was negative.      NGS resulted on 01/08/21: no actionable mutations - microsatellite stable - PD-L1 TPS unable to be obtained due to QNS     01/20/21: GKRS to the right occipital metastasis      03/01/21: PD-L1 TPS 75% - enrolls in [id number] - randomized to single agent pembrolizumab 1L      03/08/21: Brain MRI shows a new 5 mm L frontal lobe metastasis      03/10/21: begins single agent pembrolizumab under [id number]     03/31/21: C2D1 single agent pembrolizumab      04/19/21: Brain MRI: Imaging personally reviewed by me shows an interval decrease in size of right medial occipital mass now measuring 4 mm. Previously noted enhancing focus within the left  frontal lobe is no longer appreciated, apparently resolved.     04/20/21: CT C/A/P:  imaging personally reviewed by me:  The RLL mass has decreased in size -   There are inflammatory changes noted in that lobe -   No new lesions identified -      04/28/21: C3D1 pembrolizumab      05/19/21: C4D1 pembrolizumab      05/28/21: CT C/A/P shows  3.9 cm RLL mass image 202/317, 8 mm subcarinal node image 127/317 that correlates with 30 % or more decrease in diameter  consistent with partial response.      06/09/21: C5D1 pembrolizumab       08/09/21: CT C/A/P continues to have a NH - the RLL mass has decreased further in size (3.6 cm) as well as the level 7 node (7 mm)      08/11/21: C8D1 pembrolizumab      09/01/21: C9D1 pembrolizumab      10/11/21: CT C/A/P shows the RLL mass to measure 2.9 cm (image # 200/303). Level 7node measures 1.2 cm (#123/303). Right hilar node more prominent measuring 1.2 cm (#123/303). 4R 1.0 cm (#72/303).      11/03/21: C12 pembrolizumab under [id number]     11/11/21: MRI brain shows JOSE -      11/22/21: C13D1 pembrolizumab     12/13/21: CT C/A/P shows stable findings - the RLL mass measures 2.7 cm - the right paratracheal prominent node is slightly decreased in size      12/15/21: C14D1 pembrolizumab under [id number]     1/5/21: Grade 1 diarrhea irAE - starting imodium for  control. Continuing with C15D1 pembrolizumab today     2/14/2022: CT Chest/Abdomen/Pelvis shows a 2.6 cm RLL mass, down from 2.9 cm in Dec, sub carinal node measures 1.2 cm, prev 1 cm. right axiliary node has become more prominent, now measuring 1.2 cm. unclear if this represents PD (of note, this was never  target lesion) the sum of target lesions has gone down by 50% from baseline     2/18/2022: pt diagnosed with secondary adrenal insufficiency- prescribed hydrocortisone 20 mg in the morning, 10 mg in the evening, referred to endocrinology     02/25/2022: brain mri/sella turcica demonstrates normal sized pituitary gland, small lesion in right posterior occipital lobe, prev treated with no changes      05/03/2022: CT Chest/Abdomen/Pelvis shows decreases in size of RLL mass now measuring 2.2 cm, no evidence of emerging metastatic disease. At this point, pt has achieved a 59% decrease in the sum of all target lesions.      08/19/22: CT C/A/P shows stable changes with no new sites of metastasis      09/01/22: Brain MRI shows no signs of PD      11/11/2022: CT chest/abdomen/pelvis demonstrates soft tissue nodule measuring 1.6 cm in the greater omentum, concerning for peritoneal metastatic disease.  Other findings remain stable.      11/18/2022: PET scan demonstrates the soft tissue nodule in the right superior aspect of the greater omentum to have an SUV of 2.1, which could be inflammatory in nature. Bx requested      12/14/2022: Biopsy of the omental nodule demonstrates fibroadipose tissue with chronic inflammation and scarring, no malignancy identified.  No evidence of confirmed PD.  Patient remains on pembrolizumab.      01/03/2023: MRI brain with and without contrast demonstrates no intracranial metastasis     02/07/2023: CT chest/abdomen/pelvis demonstrates no signs of PD.  Right lower lobe primary lung cancer remains a stable in size.  The omental nodule that was biopsied in the past is fading away, in  concordance with pathology results showing adipose tissue  with inflammation.      2023: Completes 2 years of single agent pembrolizumab under EA [id number]      2020: Brain MRI with no remarkable findings.  No evidence of intracranial metastasis.     2023: CT chest/abdomen/pelvis demonstrates no signs of PD (60% decrease in sum of target lesions c/t baselien).    24: MRI brain demonstrating nodular enhancement in right posterior occipital lobe and adjacent calvarium    24: CT C/A/P shows no signs of PD  24:  CT shows some increase in mediastinal lymph nodes suggestive of progression.     24: PET scan shows diffuse uptake in multiple bilateral mediastinal and hilar nodes, suggestive of an inflammatory process rather than neoplastic - pt referred for bronchoscopy      PAST MEDICAL HISTORY  GERD - taking pantoprazole 40mg daily  subclavian steal syndrome - aspirin 81mg daily, plavix 75mg daily  depression - taking buspirone 5mg once daily  COVID19 vaccination + status s/p booster    SURGICAL HISTORY  Lung and lymph node biopsy  L subclavian artery surgery 2020  Hysterectomy for fibroids 2008  Appendectomy 2014     SOCIAL HISTORY  At the time of her diagnosis, Ms. Sams lives with her partner, Judy, and 2 of her partner's cousins. Has 3 dogs. - beagle, chihuahua and Raghav Briceno. 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 40 pack-year smoking history, recently quit. No history of alcohol or illicit drug use.      FAMILY HISTORY  Mother - breast cancer b/l diagnosed at age 70s, PAD, blood clots, DM c/b ESRD  Sister - melanoma  Sister - skin cancer  Sister - DM c/b ESRD, blood clots  Brother  of lung cancer in 2023    CURRENT MEDS REVIEWED  Current Medications[1]       ALLERGIES REVIEWED        SUBJECTIVE:  Patient presents today for routine follow-up evaluation. She is doing very well. She denies any fevers,  chills or night sweats. No cough, chest pain or shortness of breath. No nausea or vomiting. No constipation or diarrhea. No urinary symptoms. No rash. No neuropathy.   Works out several days a week at [employer].    Review of Systems:  A review of systems has been completed and are negative for complaints except what is stated in the HPI and/or past medical history    OBJECTIVE:  Vital Signs  /79 (BP Location: Left arm, Patient Position: Sitting, BP Cuff Size: Adult)   Pulse 76   Temp 36.2 °C (97.2 °F) (Core)   Resp 20   Wt 79.4 kg (175 lb)   SpO2 98%   BMI 28.25 kg/m²     Wt Readings from Last 5 Encounters:   25 79.4 kg (175 lb)   25 78.5 kg (173 lb)   25 80.7 kg (178 lb)   25 81.2 kg (179 lb)   25 82.9 kg (182 lb 12.8 oz)     Physical Exam:  ECO  Pain: 0  Constitutional: Well developed, awake/alert/oriented x3, no distress, alert and cooperative  Eyes: PER. sclera anicteric  ENMT: Oral mucosa moist, no lesions or thrush identified  Respiratory/Thorax: Breathing is non-labored. Lungs are clear to auscultation bilaterally. No adventitious breath sounds  Cardiovascular: S1-S2. Regular rate and rhythm. No murmurs, rubs, or gallops appreciated  Gastrointestinal: Abdomen soft nontender, nondistended, normal active bowel sounds.  Musculoskeletal: ROM intact, no joint swelling, normal strength  Extremities: normal extremities, no cyanosis, no edema, no clubbing  Lymphatics: no palpable lymphadenopathy   Skin: no rash  Neurologic: alert and oriented x3. Nonfocal exam. No myoclonus  Psychological: Pleasant, appropriate and easily engaged       Diagnostic Results   Results:  Labs:  Lab Results   Component Value Date    WBC 6.2 2025    HGB 13.1 2025    HCT 39.8 2025    MCV 90.2 2025     2025      Lab Results   Component Value Date    NEUTROABS 4.70 2025      Lab Results   Component Value Date    GLUCOSE 83 2025    CALCIUM 9.5  04/08/2025     04/08/2025    K 4.4 04/08/2025    CO2 29 04/08/2025     04/08/2025    BUN 16 04/08/2025    CREATININE 0.98 04/08/2025    MG 1.65 03/18/2025     Lab Results   Component Value Date    ALT 15 04/08/2025    AST 13 04/08/2025    ALKPHOS 82 04/08/2025    BILITOT 0.5 04/08/2025    BILIDIR 0.1 07/24/2023      Lab Results   Component Value Date    ACTH <1.5 (L) 10/25/2024    CORTISOL 0.6 (L) 10/25/2024    TSH 1.38 04/08/2025    FREET4 1.5 04/08/2025       Imaging:    April 18, 2025 CT scan:    CT Chest   IMPRESSION:  1.  Stable appearance of right lower lobe lung nodule, with interval appearance of subcentimeter upper lobe nodular opacities with  mediastinal lymphadenopathy.  2. While interval metastatic disease can be considered, correlate with concern for inflammatory/ sarcoidoidal drug reaction. Close  attention on imaging follow-up is recommended.    CT Abd  IMPRESSION:  No evidence of metastatic disease. Subcutaneous fluid collection consistent with previous hernia surgery.      Assessment/Plan   #Stage HEAVEN NSCLC with isolated brain metastasis   -She is very well clinically, imagine reviewed with no evidence of progression  -Notably level 7 node <1 cm in shortest diameter (from 2.2 cm)  -Will follow per trial protocol     #Isolated brain metastasis s/p GKRS with Dr. Peterson  Follows with radiation oncology  MRI brain as above   Reviewed plan with SUHAS Mcmillan-CNP -MRI brain to be repeated under radiation oncology guidance     #Secondary ir-adrenal insufficiency  -currently on hydrocortisone replacement 20 mg AM & 10 mg 1400  -followed by endocrinology  - Seen in ER for adrenal insufficiency after missing several home dose of Cortef following surgery.      # ir-Hypothyroidism   - Managed by endocrinology   -TSH low, free T4 elevated, with associated unintentional weight loss as above; follows with endocrinology closely     #Low vitamin D and B12 levels   Replacement prescribed in the past  and she has been compliant.  Most recent results WNL (B12 slightly higher)      # Dry skin - improved  - likely secondary to  ir-AE  - should continue to moisturize with CeraVe BID and apply sunscreen   - call with any news sx such as rash, pruritus   - Advised to use SPF 50 or SPF clothing during summer     #Depression  - multifactorial secondary to combination of current illness, prior illness earlier in the year, health-related stressors at home and hypothyroidism  - has improved with treatment of hypothyroidism and she has been in a great mood over the last several visits, including today.   - taking Wellbutrin (mainly for smoking cessation)  - continue xanax 0.5mg as needed q12h     #h/o subclavian steal syndrome  -continuing ASA, plavix  -Vascular study/US on 02/01/23 demonstrating the left carotid to subclavian artery bypass graft appears widely patent.      SUHAS Loera-CNP  [facility]  Thoracic + H&N Medical Oncology     I spent a total of 30+ minutes on the date of the service which included preparing to see the patient, face-to-face patient care, completing clinical documentation, obtaining and / or reviewing separately obtained history, counseling and educating the patient/family/caregiver, ordering medications, tests, or procedures, communicating with other healthcare providers (not separately reported), independently interpreting results, not separately reported, and communicating results to the patient/family/caregiver, Name and date of birth verified.            [1]   Current Outpatient Medications:     acetaminophen (Tylenol) 325 mg tablet, Take 3 tablets (975 mg) by mouth every 8 hours., Disp: , Rfl:     alcohol swabs pads, medicated, use as directed once daily, Disp: , Rfl:     aspirin 81 mg chewable tablet, Chew 1 tablet (81 mg) once daily., Disp: , Rfl:     atorvastatin (Lipitor) 20 mg tablet, Take 1 tablet (20 mg) by mouth once daily., Disp: 90 tablet, Rfl: 1    BD Luer-Haily  "Syringe 3 mL 25 gauge x 1\" syringe, For injection as needed, Disp: 2 each, Rfl: 3    clopidogrel (Plavix) 75 mg tablet, Take 1 tablet (75 mg) by mouth once daily., Disp: 90 tablet, Rfl: 1    dexAMETHasone 4 mg/mL injection, inject 1 MILLILITER (4 MG) FOR 1 DOSE IN CASE OF EMERGENCY, Disp: , Rfl:     dulaglutide (Trulicity) 1.5 mg/0.5 mL pen injector injection, Inject 1.5 mg under the skin 1 (one) time per week., Disp: 4 each, Rfl: 11    ergocalciferol (Vitamin D-2) 1.25 MG (50031 UT) capsule, Take 1 capsule (50,000 Units) by mouth 1 (one) time per week., Disp: 12 capsule, Rfl: 1    escitalopram (Lexapro) 5 mg tablet, Take 1 tablet (5 mg) by mouth once daily., Disp: 90 tablet, Rfl: 1    fluticasone (Flonase) 50 mcg/actuation nasal spray, Administer 1 spray into each nostril once daily. Shake gently. Before first use, prime pump. After use, clean tip and replace cap., Disp: 16 g, Rfl: 0    hydrocortisone (Cortef) 10 mg tablet, 20mg in the morning and 10mg in the afternoon, double the dose during sick days, Disp: 350 tablet, Rfl: 3    levothyroxine (Synthroid, Levoxyl) 137 mcg tablet, Take 1 tablet (137 mcg) by mouth early in the morning.., Disp: 90 tablet, Rfl: 3    lidocaine HCl-hydrocortison ac (Radiaura) 3-0.5 % cream cream, Apply 1 Application topically 2 times a day., Disp: 28.3 g, Rfl: 0    metFORMIN (Glucophage) 500 mg tablet, Take 1 tablet (500 mg) by mouth 2 times daily (morning and late afternoon). Take with food., Disp: 180 tablet, Rfl: 1    methylPREDNISolone sod succinate, PF, (SOLU-Medrol) 40 mg/mL injection, Inject once as needed if unable to take PO hydrocortisone then go to the ED, Disp: 1 each, Rfl: 3    methylPREDNISolone sodium succinate (SOLU-Medrol) 40 mg injection, 40mg injection as needed if unable to tolerate hydrocortisone by mouth then go to the ED, Disp: 1 each, Rfl: 3    Microlet Lancet misc, use 1 LANCET to TEST BLOOD SUGAR once daily as directed, Disp: , Rfl:     needle, disp, 20 G 20 " "gauge x 1\" needle, 40mg injection as needed if unable to tolerate hydrocortisone by mouth then go to the ED, Disp: 3 each, Rfl: 3    ondansetron ODT (Zofran-ODT) 4 mg disintegrating tablet, Dissolve 1 tablet (4 mg) in the mouth every 8 hours if needed for nausea or vomiting., Disp: 30 tablet, Rfl: 0    OneTouch Ultra Test strip, Bg check daily, Disp: 100 strip, Rfl: 3    pantoprazole (ProtoNix) 40 mg EC tablet, Take 1 tablet (40 mg) by mouth once daily in the morning. Take before meals. 30 MINUTES PRIOR TO BREAKFAST, Disp: 90 tablet, Rfl: 1    sterile water injection, 1ml to reconstitute with methylprednisolone as needed, Disp: 10 mL, Rfl: 3    syringe with needle 1 mL 25 gauge x 1\" syringe, 40mg injection as needed if unable to tolerate hydrocortisone by mouth then go to the ED, Disp: 3 each, Rfl: 3    Vascepa 1 gram capsule, Take 2 capsules (2 g) by mouth 2 times daily (morning and late afternoon)., Disp: 360 capsule, Rfl: 3    cyanocobalamin (Vitamin B-12) 1,000 mcg tablet, TAKE ONE TABLET BY MOUTH EVERY DAY, Disp: 90 tablet, Rfl: 0    famotidine (Pepcid) 20 mg tablet, Take 1 tablet (20 mg) by mouth 2 times a day., Disp: 60 tablet, Rfl: 5    gabapentin (Neurontin) 300 mg capsule, Take 1 capsule (300 mg) by mouth 3 times a day for 10 days., Disp: 30 capsule, Rfl: 0    traZODone (Desyrel) 50 mg tablet, Take 1 tablet (50 mg) by mouth as needed at bedtime for sleep., Disp: 90 tablet, Rfl: 1    "

## 2025-05-07 ENCOUNTER — PATIENT OUTREACH (OUTPATIENT)
Dept: PRIMARY CARE | Facility: CLINIC | Age: 60
End: 2025-05-07
Payer: COMMERCIAL

## 2025-05-08 ENCOUNTER — APPOINTMENT (OUTPATIENT)
Dept: ENDOCRINOLOGY | Facility: CLINIC | Age: 60
End: 2025-05-08
Payer: COMMERCIAL

## 2025-05-08 VITALS
BODY MASS INDEX: 27.89 KG/M2 | DIASTOLIC BLOOD PRESSURE: 71 MMHG | HEART RATE: 67 BPM | TEMPERATURE: 97.7 F | WEIGHT: 172.8 LBS | SYSTOLIC BLOOD PRESSURE: 112 MMHG

## 2025-05-08 DIAGNOSIS — E03.9 HYPOTHYROIDISM, UNSPECIFIED TYPE: ICD-10-CM

## 2025-05-08 DIAGNOSIS — E78.2 MIXED HYPERLIPIDEMIA: ICD-10-CM

## 2025-05-08 DIAGNOSIS — E11.65 TYPE 2 DIABETES MELLITUS WITH HYPERGLYCEMIA, WITHOUT LONG-TERM CURRENT USE OF INSULIN: ICD-10-CM

## 2025-05-08 DIAGNOSIS — E27.40 ADRENAL INSUFFICIENCY (MULTI): Primary | ICD-10-CM

## 2025-05-08 PROCEDURE — 1036F TOBACCO NON-USER: CPT | Performed by: STUDENT IN AN ORGANIZED HEALTH CARE EDUCATION/TRAINING PROGRAM

## 2025-05-08 PROCEDURE — 99214 OFFICE O/P EST MOD 30 MIN: CPT | Performed by: STUDENT IN AN ORGANIZED HEALTH CARE EDUCATION/TRAINING PROGRAM

## 2025-05-08 PROCEDURE — G2211 COMPLEX E/M VISIT ADD ON: HCPCS | Performed by: STUDENT IN AN ORGANIZED HEALTH CARE EDUCATION/TRAINING PROGRAM

## 2025-05-08 PROCEDURE — 3074F SYST BP LT 130 MM HG: CPT | Performed by: STUDENT IN AN ORGANIZED HEALTH CARE EDUCATION/TRAINING PROGRAM

## 2025-05-08 PROCEDURE — 3078F DIAST BP <80 MM HG: CPT | Performed by: STUDENT IN AN ORGANIZED HEALTH CARE EDUCATION/TRAINING PROGRAM

## 2025-05-08 RX ORDER — ICOSAPENT ETHYL 1000 MG/1
2 CAPSULE ORAL
Qty: 360 CAPSULE | Refills: 3 | Status: SHIPPED | OUTPATIENT
Start: 2025-05-08 | End: 2026-05-08

## 2025-05-08 RX ORDER — LEVOTHYROXINE SODIUM 137 UG/1
137 TABLET ORAL DAILY
Qty: 90 TABLET | Refills: 3 | Status: SHIPPED | OUTPATIENT
Start: 2025-05-08 | End: 2026-05-08

## 2025-05-08 RX ORDER — HYDROCORTISONE 10 MG/1
TABLET ORAL
Qty: 300 TABLET | Refills: 3 | Status: SHIPPED | OUTPATIENT
Start: 2025-05-08
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
## 2025-05-08 NOTE — PATIENT INSTRUCTIONS
Reduce hydrocortisone   HC 15 mg in the AM, 5mg around 2pm    Continue levothyroxine   Continue trulicity     Stop metformin     Follow up 6 months, fasting lab prior to next visit     Libra Gillespie MD  Divison of Endocrinology   [facility]   Phone: [phone]    option 4, then option 1  Fax: [phone]

## 2025-05-27 ENCOUNTER — APPOINTMENT (OUTPATIENT)
Facility: CLINIC | Age: 60
End: 2025-05-27
Payer: COMMERCIAL

## 2025-06-05 ENCOUNTER — PATIENT OUTREACH (OUTPATIENT)
Dept: PRIMARY CARE | Facility: CLINIC | Age: 60
End: 2025-06-05
Payer: COMMERCIAL

## 2025-06-17 ENCOUNTER — APPOINTMENT (OUTPATIENT)
Dept: PRIMARY CARE | Facility: CLINIC | Age: 60
End: 2025-06-17
Payer: COMMERCIAL

## 2025-06-23 DIAGNOSIS — E55.9 VITAMIN D DEFICIENCY: ICD-10-CM

## 2025-06-23 RX ORDER — ERGOCALCIFEROL 1.25 MG/1
1.25 CAPSULE ORAL
Qty: 12 CAPSULE | Refills: 0 | Status: SHIPPED | OUTPATIENT
Start: 2025-06-23

## 2025-06-24 ENCOUNTER — APPOINTMENT (OUTPATIENT)
Dept: PRIMARY CARE | Facility: CLINIC | Age: 60
End: 2025-06-24
Payer: COMMERCIAL

## 2025-06-24 ENCOUNTER — HOSPITAL ENCOUNTER (OUTPATIENT)
Dept: RADIOLOGY | Facility: CLINIC | Age: 60
Discharge: HOME | End: 2025-06-24
Payer: COMMERCIAL

## 2025-06-24 VITALS
WEIGHT: 171 LBS | OXYGEN SATURATION: 96 % | HEART RATE: 61 BPM | BODY MASS INDEX: 27.48 KG/M2 | SYSTOLIC BLOOD PRESSURE: 115 MMHG | HEIGHT: 66 IN | DIASTOLIC BLOOD PRESSURE: 74 MMHG

## 2025-06-24 DIAGNOSIS — I73.9 PAD (PERIPHERAL ARTERY DISEASE): ICD-10-CM

## 2025-06-24 DIAGNOSIS — E78.2 MIXED HYPERLIPIDEMIA: ICD-10-CM

## 2025-06-24 DIAGNOSIS — G45.8 SUBCLAVIAN STEAL SYNDROME: ICD-10-CM

## 2025-06-24 DIAGNOSIS — R11.0 NAUSEA: ICD-10-CM

## 2025-06-24 DIAGNOSIS — C79.31 LUNG CANCER METASTATIC TO BRAIN (MULTI): ICD-10-CM

## 2025-06-24 DIAGNOSIS — F32.A DEPRESSION, UNSPECIFIED DEPRESSION TYPE: ICD-10-CM

## 2025-06-24 DIAGNOSIS — R53.83 FATIGUE, UNSPECIFIED TYPE: ICD-10-CM

## 2025-06-24 DIAGNOSIS — E03.9 ACQUIRED HYPOTHYROIDISM: ICD-10-CM

## 2025-06-24 DIAGNOSIS — M25.561 ACUTE PAIN OF RIGHT KNEE: ICD-10-CM

## 2025-06-24 DIAGNOSIS — E11.69 TYPE 2 DIABETES MELLITUS WITH OTHER SPECIFIED COMPLICATION, WITHOUT LONG-TERM CURRENT USE OF INSULIN: ICD-10-CM

## 2025-06-24 DIAGNOSIS — C79.31 METASTATIC CANCER TO BRAIN (MULTI): ICD-10-CM

## 2025-06-24 DIAGNOSIS — C34.90 LUNG CANCER METASTATIC TO BRAIN (MULTI): ICD-10-CM

## 2025-06-24 DIAGNOSIS — E11.22 TYPE 2 DIABETES MELLITUS WITH STAGE 3 CHRONIC KIDNEY DISEASE, WITHOUT LONG-TERM CURRENT USE OF INSULIN, UNSPECIFIED WHETHER STAGE 3A OR 3B CKD (MULTI): ICD-10-CM

## 2025-06-24 DIAGNOSIS — E78.1 HYPERTRIGLYCERIDEMIA: ICD-10-CM

## 2025-06-24 DIAGNOSIS — Z12.31 ENCOUNTER FOR SCREENING MAMMOGRAM FOR MALIGNANT NEOPLASM OF BREAST: ICD-10-CM

## 2025-06-24 DIAGNOSIS — Z00.00 ROUTINE GENERAL MEDICAL EXAMINATION AT HEALTH CARE FACILITY: Primary | ICD-10-CM

## 2025-06-24 DIAGNOSIS — Z00.00 MEDICARE ANNUAL WELLNESS VISIT, SUBSEQUENT: ICD-10-CM

## 2025-06-24 DIAGNOSIS — N18.31 STAGE 3A CHRONIC KIDNEY DISEASE (MULTI): ICD-10-CM

## 2025-06-24 DIAGNOSIS — N18.30 TYPE 2 DIABETES MELLITUS WITH STAGE 3 CHRONIC KIDNEY DISEASE, WITHOUT LONG-TERM CURRENT USE OF INSULIN, UNSPECIFIED WHETHER STAGE 3A OR 3B CKD (MULTI): ICD-10-CM

## 2025-06-24 DIAGNOSIS — C34.91: ICD-10-CM

## 2025-06-24 DIAGNOSIS — F41.9 ANXIETY: ICD-10-CM

## 2025-06-24 DIAGNOSIS — E27.40 HYPOADRENALISM (MULTI): ICD-10-CM

## 2025-06-24 DIAGNOSIS — Z11.59 NEED FOR HEPATITIS C SCREENING TEST: ICD-10-CM

## 2025-06-24 DIAGNOSIS — E27.40 ADRENAL INSUFFICIENCY (MULTI): ICD-10-CM

## 2025-06-24 DIAGNOSIS — C34.90 ADENOCARCINOMA OF LUNG, UNSPECIFIED LATERALITY (MULTI): ICD-10-CM

## 2025-06-24 DIAGNOSIS — Z71.89 CARDIAC RISK COUNSELING: ICD-10-CM

## 2025-06-24 DIAGNOSIS — K21.9 GASTROESOPHAGEAL REFLUX DISEASE, UNSPECIFIED WHETHER ESOPHAGITIS PRESENT: ICD-10-CM

## 2025-06-24 PROCEDURE — 73562 X-RAY EXAM OF KNEE 3: CPT | Mod: RT

## 2025-06-24 PROCEDURE — 73562 X-RAY EXAM OF KNEE 3: CPT | Mod: RIGHT SIDE | Performed by: RADIOLOGY

## 2025-06-24 RX ORDER — ONDANSETRON 4 MG/1
4 TABLET, ORALLY DISINTEGRATING ORAL EVERY 8 HOURS PRN
Qty: 30 TABLET | Refills: 1 | Status: SHIPPED | OUTPATIENT
Start: 2025-06-24

## 2025-06-24 RX ORDER — DICLOFENAC SODIUM 75 MG/1
75 TABLET, DELAYED RELEASE ORAL DAILY PRN
Qty: 10 TABLET | Refills: 0 | Status: SHIPPED | OUTPATIENT
Start: 2025-06-24

## 2025-06-24 ASSESSMENT — ENCOUNTER SYMPTOMS
FATIGUE: 0
NERVOUS/ANXIOUS: 0
CONSTIPATION: 0
FEVER: 0
LOSS OF SENSATION IN FEET: 0
LIGHT-HEADEDNESS: 0
DYSPHORIC MOOD: 0
DEPRESSION: 0
MYALGIAS: 0
ABDOMINAL PAIN: 0
VOMITING: 0
COLOR CHANGE: 0
DIARRHEA: 0
COUGH: 0
FREQUENCY: 0
DIZZINESS: 0
RHINORRHEA: 0
NUMBNESS: 0
CHILLS: 0
SHORTNESS OF BREATH: 0
OCCASIONAL FEELINGS OF UNSTEADINESS: 0
PALPITATIONS: 0
DYSURIA: 0
NAUSEA: 0
ARTHRALGIAS: 1

## 2025-06-24 ASSESSMENT — ACTIVITIES OF DAILY LIVING (ADL)
BATHING: INDEPENDENT
TAKING_MEDICATION: INDEPENDENT
MANAGING_FINANCES: INDEPENDENT
DRESSING: INDEPENDENT
DOING_HOUSEWORK: INDEPENDENT
GROCERY_SHOPPING: INDEPENDENT

## 2025-06-24 NOTE — PROGRESS NOTES
Subjective   Reason for Visit: Betsy Sams is an 59 y.o. female here for a Medicare Wellness visit.     Past Medical, Surgical, and Family History reviewed and updated in chart.    Reviewed all medications by prescribing practitioner or clinical pharmacist (such as prescriptions, OTCs, herbal therapies and supplements) and documented in the medical record.    HPI    Right knee pain worsened over the last month.  The pain has been present for a a number of years as well.  She has not had this worked up previously and is interested in getting imaging.    Dental: Last seen a few years ago.  Vision: Corrective vision with glasses. Regular eye exams.     Diet: Well balanced. Diabetic diet.  Exercise: Walking. Push mower. Swimming.  Caffeine: Tea in the morning.  Fluids: Well hydrated.  Supplements: B12, vitamin D, Omega 3     Tobacco: None. Quit in 2021.  Alcohol: None.  Recreational Drugs: None.     Last Colonoscopy: Previously ordered. She has not had one in about 15 years. Has an appointment with GI on 7/21/2025  Last Pap smear: Hysterectomy in 2008.  Mammogram: New order placed  Low Dose Lung CT Screening: Followed by Heme/Onc for metastatic lung cancer.      Influenza: Recommended annually.  Tetanus: 02/08/2022  Pneumonia: Prevnar 20, 4/10/2023.  COVID: Recommended updated vaccine.  Shingles: Recommended. Never had chicken pox  RSV: Recommended at 60.    Patient Care Team:  Bahman Montiel DO as PCP - General (Family Medicine)  Michelle Owen MD as Consulting Physician (Hematology and Oncology)  MELISSA Whitley as  ()  Tom Magana MD as Consulting Physician (Hematology and Oncology)  Jorge Alberto Rodriguez MD as Surgeon (Vascular Surgery)     Review of Systems   Constitutional:  Negative for chills, fatigue and fever.   HENT:  Negative for congestion and rhinorrhea.    Eyes:  Negative for visual disturbance.   Respiratory:  Negative for cough and shortness of breath.   "  Cardiovascular:  Negative for chest pain and palpitations.   Gastrointestinal:  Negative for abdominal pain, constipation, diarrhea, nausea and vomiting.   Genitourinary:  Negative for dysuria and frequency.   Musculoskeletal:  Positive for arthralgias (right knee). Negative for myalgias.   Skin:  Negative for color change and rash.   Neurological:  Negative for dizziness, light-headedness and numbness.   Psychiatric/Behavioral:  Negative for dysphoric mood. The patient is not nervous/anxious.        Objective   Vitals:  /74   Pulse 61   Ht 1.683 m (5' 6.25\")   Wt 77.6 kg (171 lb)   SpO2 96%   BMI 27.39 kg/m²       Physical Exam  Vitals and nursing note reviewed.   Constitutional:       General: She is not in acute distress.     Appearance: Normal appearance. She is normal weight. She is not ill-appearing or toxic-appearing.   HENT:      Head: Normocephalic and atraumatic.      Right Ear: Tympanic membrane, ear canal and external ear normal.      Left Ear: Tympanic membrane, ear canal and external ear normal.      Nose: Nose normal.      Mouth/Throat:      Mouth: Mucous membranes are moist.   Eyes:      Extraocular Movements: Extraocular movements intact.      Conjunctiva/sclera: Conjunctivae normal.      Pupils: Pupils are equal, round, and reactive to light.   Cardiovascular:      Rate and Rhythm: Normal rate and regular rhythm.      Pulses: Normal pulses.      Heart sounds: Normal heart sounds.   Pulmonary:      Effort: Pulmonary effort is normal.      Breath sounds: Normal breath sounds.   Abdominal:      General: Abdomen is flat. Bowel sounds are normal.      Palpations: Abdomen is soft.   Musculoskeletal:         General: Tenderness present. Normal range of motion.      Cervical back: Normal range of motion and neck supple.   Skin:     General: Skin is warm.   Neurological:      General: No focal deficit present.      Mental Status: She is alert and oriented to person, place, and time. Mental " status is at baseline.      Cranial Nerves: No cranial nerve deficit.      Sensory: No sensory deficit.   Psychiatric:         Mood and Affect: Mood normal.         Behavior: Behavior normal.         Thought Content: Thought content normal.         Judgment: Judgment normal.       The 10-year ASCVD risk score (Lora ALFONSO, et al., 2019) is: 4.8%    Values used to calculate the score:      Age: 59 years      Sex: Female      Is Non- : No      Diabetic: Yes      Tobacco smoker: No      Systolic Blood Pressure: 115 mmHg      Is BP treated: No      HDL Cholesterol: 41 mg/dL      Total Cholesterol: 141 mg/dL  Time spent was 15 mins reviewing    Assessment & Plan  Medicare annual wellness visit, subsequent         Cardiac risk counseling         Acute pain of right knee    Orders:    XR knee right 4+ views; Future    diclofenac (Voltaren) 75 mg EC tablet; Take 1 tablet (75 mg) by mouth once daily as needed (pain). Do not crush, chew, or split.    Nausea    Orders:    ondansetron ODT (Zofran-ODT) 4 mg disintegrating tablet; Dissolve 1 tablet (4 mg) in the mouth every 8 hours if needed for nausea or vomiting.    Need for hepatitis C screening test    Orders:    Hepatitis C antibody; Future    Hypertriglyceridemia  Chronic.  Stable. Following with endocrinology.         Mixed hyperlipidemia  Chronic stable. On atorvastatin.         PAD (peripheral artery disease)  Follows with vascular surgery Dr. Rodriguez. Chronic and stable at this time. Continue current rx med.         Subclavian steal syndrome  Follows with vascular surgery Dr. Rodriguez. Chronic and stable at this time. Continue current rx med.         Type 2 diabetes mellitus with other specified complication, without long-term current use of insulin  Chronic. Stable. Following with Endocrinology with Dr. Gillespie with           Adrenal insufficiency (Multi)  Following with Endocrinology with Dr. Gillespie with           Hypoadrenalism (Multi)  Chronic.  Stable. Following with Endocrinology with Dr. Gillespie with           Acquired hypothyroidism  Chronic. Stable. Following with Endocrinology with Dr. Gillespie with           Type 2 diabetes mellitus with stage 3 chronic kidney disease, without long-term current use of insulin, unspecified whether stage 3a or 3b CKD (Multi)  Chronic. Stable. Following with Endocrinology with Dr. Gillespie with           Gastroesophageal reflux disease, unspecified whether esophagitis present  Chronic.  Stable. On pantoprazole.         Stage 3a chronic kidney disease (Multi)  Chronic and stable at this time.    Orders:    Vitamin D 25-Hydroxy,Total (for eval of Vitamin D levels); Future    Albumin-Creatinine Ratio, Urine Random; Future    Urinalysis with Reflex Microscopic; Future    Ferritin; Future    Folate; Future    Vitamin B12; Future    Iron and TIBC; Future    CBC and Auto Differential; Future    Adenocarcinoma of lung, unspecified laterality (Multi)  Following with hematology and oncology with Dr. Tom Magnaa with .         Lung cancer metastatic to brain (Multi)  Following with hematology and oncology with Dr. Tom Magana with .         Metastatic cancer to brain (Multi)  Following with hematology and oncology with Dr. Tom Magana with .         Malignant neoplasm of right lung stage 4 (Multi)  Following with hematology and oncology with Dr. Tom Magana with .         Anxiety  Chronic. Stable.  Following with behavioral health management.         Depression, unspecified depression type  Chronic. Stable.  Following with behavioral health management.         Encounter for screening mammogram for malignant neoplasm of breast    Orders:    BI mammo bilateral screening tomosynthesis; Future    Fatigue, unspecified type    Orders:    Ferritin; Future    Folate; Future    Vitamin B12; Future    Iron and TIBC; Future    CBC and Auto Differential; Future           Cardiac Risk Assessment  15 - 20 minutes were  spent discussing Cardiovascular risk and, if needed, lifestyle modifications were recommended, including nutritional choices, exercise, and elimination of habits contributing to risk.   Aspirin use/disuse was discussed following the guidelines below:  low dose ASA ( mg) should be considered:    If prior Heart Attack/Stroke/Peripheral vascular disease:  Generally recommend daily low dose aspirin unless extremely high bleeding risk (e.g., gastrointestinal).    If no prior Heart Attack/Stroke/Peripheral vascular disease:              Age over 70: Do not use Aspirin for prevention    Age less than 70 and 10-year cardiovascular disease risk is >20%: use low dose Aspirin for prevention.

## 2025-06-24 NOTE — PATIENT INSTRUCTIONS
Thank you for coming in for your Medicare annual wellness examination.    Please continue with regular follow-up with your specialists.    Please call for any additional medication refills.    You can go downstairs and get your x-ray done of your knee.  You can take Tylenol for the pain.  You could take an occasional Voltaren and I am giving you a short-term prescription today.  Please make sure that you are careful especially since you are on the blood thinning medication.  You would not be able to take any ibuprofen or anything else over-the-counter while you are taking that medication.    Also and refilled the Zofran prescription as well.    Lab work orders are in place for you and you can get this done at any point.  You do not necessarily need to be fasting for the blood work and this can be done in [facility] laboratory.    You are scheduled to see gastroenterology coming up in July.  Please make sure you discuss with them getting an updated colonoscopy since you would be due since it has been a number of years since you have last received that.    We will then plan on following up based on the results of the other lab work.    We can continue with every 6 months for follow-up at least at this point.    Please call sooner with any other acute concerns or complaints.    Thank you

## 2025-06-24 NOTE — ASSESSMENT & PLAN NOTE
Chronic and stable at this time.    Orders:    Vitamin D 25-Hydroxy,Total (for eval of Vitamin D levels); Future    Albumin-Creatinine Ratio, Urine Random; Future    Urinalysis with Reflex Microscopic; Future    Ferritin; Future    Folate; Future    Vitamin B12; Future    Iron and TIBC; Future    CBC and Auto Differential; Future

## 2025-06-25 ENCOUNTER — TELEPHONE (OUTPATIENT)
Dept: VASCULAR SURGERY | Facility: HOSPITAL | Age: 60
End: 2025-06-25
Payer: COMMERCIAL

## 2025-06-25 LAB
25(OH)D3+25(OH)D2 SERPL-MCNC: 73 NG/ML (ref 30–100)
ALBUMIN/CREAT UR: NORMAL MG/G CREAT
APPEARANCE UR: CLEAR
BACTERIA #/AREA URNS HPF: ABNORMAL /HPF
BASOPHILS # BLD AUTO: 38 CELLS/UL (ref 0–200)
BASOPHILS NFR BLD AUTO: 0.5 %
BILIRUB UR QL STRIP: NEGATIVE
COLOR UR: YELLOW
CREAT UR-MCNC: 114 MG/DL (ref 20–275)
EOSINOPHIL # BLD AUTO: 91 CELLS/UL (ref 15–500)
EOSINOPHIL NFR BLD AUTO: 1.2 %
ERYTHROCYTE [DISTWIDTH] IN BLOOD BY AUTOMATED COUNT: 12.5 % (ref 11–15)
FERRITIN SERPL-MCNC: 27 NG/ML (ref 16–232)
FOLATE SERPL-MCNC: 8 NG/ML
GLUCOSE UR QL STRIP: NEGATIVE
HCT VFR BLD AUTO: 41.7 % (ref 35–45)
HCV AB SERPL QL IA: NORMAL
HGB BLD-MCNC: 13.7 G/DL (ref 11.7–15.5)
HGB UR QL STRIP: NEGATIVE
HYALINE CASTS #/AREA URNS LPF: ABNORMAL /LPF
IRON SATN MFR SERPL: 30 % (CALC) (ref 16–45)
IRON SERPL-MCNC: 91 MCG/DL (ref 45–160)
KETONES UR QL STRIP: NEGATIVE
LEUKOCYTE ESTERASE UR QL STRIP: ABNORMAL
LYMPHOCYTES # BLD AUTO: 3701 CELLS/UL (ref 850–3900)
LYMPHOCYTES NFR BLD AUTO: 48.7 %
MCH RBC QN AUTO: 29.8 PG (ref 27–33)
MCHC RBC AUTO-ENTMCNC: 32.9 G/DL (ref 32–36)
MCV RBC AUTO: 90.8 FL (ref 80–100)
MICROALBUMIN UR-MCNC: <0.2 MG/DL
MONOCYTES # BLD AUTO: 486 CELLS/UL (ref 200–950)
MONOCYTES NFR BLD AUTO: 6.4 %
NEUTROPHILS # BLD AUTO: 3283 CELLS/UL (ref 1500–7800)
NEUTROPHILS NFR BLD AUTO: 43.2 %
NITRITE UR QL STRIP: NEGATIVE
PH UR STRIP: 5.5 [PH] (ref 5–8)
PLATELET # BLD AUTO: 260 THOUSAND/UL (ref 140–400)
PMV BLD REES-ECKER: 10.3 FL (ref 7.5–12.5)
PROT UR QL STRIP: NEGATIVE
RBC # BLD AUTO: 4.59 MILLION/UL (ref 3.8–5.1)
RBC #/AREA URNS HPF: ABNORMAL /HPF
SERVICE CMNT-IMP: ABNORMAL
SP GR UR STRIP: 1.02 (ref 1–1.03)
SQUAMOUS #/AREA URNS HPF: ABNORMAL /HPF
TIBC SERPL-MCNC: 299 MCG/DL (CALC) (ref 250–450)
VIT B12 SERPL-MCNC: 569 PG/ML (ref 200–1100)
WBC # BLD AUTO: 7.6 THOUSAND/UL (ref 3.8–10.8)
WBC #/AREA URNS HPF: ABNORMAL /HPF

## 2025-06-26 DIAGNOSIS — G45.8 SUBCLAVIAN STEAL SYNDROME: Primary | ICD-10-CM

## 2025-06-26 DIAGNOSIS — I73.9 PAD (PERIPHERAL ARTERY DISEASE): ICD-10-CM

## 2025-07-07 ENCOUNTER — HOSPITAL ENCOUNTER (EMERGENCY)
Facility: HOSPITAL | Age: 60
Discharge: HOME | End: 2025-07-07
Attending: STUDENT IN AN ORGANIZED HEALTH CARE EDUCATION/TRAINING PROGRAM
Payer: COMMERCIAL

## 2025-07-07 ENCOUNTER — APPOINTMENT (OUTPATIENT)
Dept: RADIOLOGY | Facility: HOSPITAL | Age: 60
End: 2025-07-07
Payer: COMMERCIAL

## 2025-07-07 ENCOUNTER — APPOINTMENT (OUTPATIENT)
Dept: CARDIOLOGY | Facility: HOSPITAL | Age: 60
End: 2025-07-07
Payer: COMMERCIAL

## 2025-07-07 VITALS
OXYGEN SATURATION: 99 % | DIASTOLIC BLOOD PRESSURE: 78 MMHG | SYSTOLIC BLOOD PRESSURE: 125 MMHG | HEIGHT: 66 IN | RESPIRATION RATE: 18 BRPM | BODY MASS INDEX: 27.8 KG/M2 | WEIGHT: 173 LBS | TEMPERATURE: 97.9 F | HEART RATE: 62 BPM

## 2025-07-07 DIAGNOSIS — R10.84 ABDOMINAL PAIN, GENERALIZED: Primary | ICD-10-CM

## 2025-07-07 LAB
ALBUMIN SERPL BCP-MCNC: 3.8 G/DL (ref 3.4–5)
ALP SERPL-CCNC: 74 U/L (ref 33–136)
ALT SERPL W P-5'-P-CCNC: 25 U/L (ref 7–45)
ANION GAP SERPL CALC-SCNC: 9 MMOL/L (ref 10–20)
AST SERPL W P-5'-P-CCNC: 20 U/L (ref 9–39)
BASOPHILS # BLD AUTO: 0.03 X10*3/UL (ref 0–0.1)
BASOPHILS NFR BLD AUTO: 0.3 %
BILIRUB DIRECT SERPL-MCNC: 0.1 MG/DL (ref 0–0.3)
BILIRUB SERPL-MCNC: 0.5 MG/DL (ref 0–1.2)
BUN SERPL-MCNC: 12 MG/DL (ref 6–23)
CALCIUM SERPL-MCNC: 9.2 MG/DL (ref 8.6–10.3)
CARDIAC TROPONIN I PNL SERPL HS: <3 NG/L (ref 0–13)
CHLORIDE SERPL-SCNC: 105 MMOL/L (ref 98–107)
CO2 SERPL-SCNC: 30 MMOL/L (ref 21–32)
CREAT SERPL-MCNC: 0.97 MG/DL (ref 0.5–1.05)
EGFRCR SERPLBLD CKD-EPI 2021: 67 ML/MIN/1.73M*2
EOSINOPHIL # BLD AUTO: 0 X10*3/UL (ref 0–0.7)
EOSINOPHIL NFR BLD AUTO: 0 %
ERYTHROCYTE [DISTWIDTH] IN BLOOD BY AUTOMATED COUNT: 12.9 % (ref 11.5–14.5)
GLUCOSE SERPL-MCNC: 101 MG/DL (ref 74–99)
HCT VFR BLD AUTO: 39.8 % (ref 36–46)
HGB BLD-MCNC: 13.2 G/DL (ref 12–16)
IMM GRANULOCYTES # BLD AUTO: 0.03 X10*3/UL (ref 0–0.7)
IMM GRANULOCYTES NFR BLD AUTO: 0.3 % (ref 0–0.9)
LACTATE SERPL-SCNC: 1.9 MMOL/L (ref 0.4–2)
LIPASE SERPL-CCNC: 19 U/L (ref 9–82)
LYMPHOCYTES # BLD AUTO: 2.27 X10*3/UL (ref 1.2–4.8)
LYMPHOCYTES NFR BLD AUTO: 25.2 %
MCH RBC QN AUTO: 29.4 PG (ref 26–34)
MCHC RBC AUTO-ENTMCNC: 33.2 G/DL (ref 32–36)
MCV RBC AUTO: 89 FL (ref 80–100)
MONOCYTES # BLD AUTO: 0.49 X10*3/UL (ref 0.1–1)
MONOCYTES NFR BLD AUTO: 5.4 %
NEUTROPHILS # BLD AUTO: 6.19 X10*3/UL (ref 1.2–7.7)
NEUTROPHILS NFR BLD AUTO: 68.8 %
NRBC BLD-RTO: 0 /100 WBCS (ref 0–0)
PLATELET # BLD AUTO: 227 X10*3/UL (ref 150–450)
POTASSIUM SERPL-SCNC: 4.8 MMOL/L (ref 3.5–5.3)
PROT SERPL-MCNC: 6.8 G/DL (ref 6.4–8.2)
RBC # BLD AUTO: 4.49 X10*6/UL (ref 4–5.2)
SODIUM SERPL-SCNC: 139 MMOL/L (ref 136–145)
WBC # BLD AUTO: 9 X10*3/UL (ref 4.4–11.3)

## 2025-07-07 PROCEDURE — 84484 ASSAY OF TROPONIN QUANT: CPT | Performed by: STUDENT IN AN ORGANIZED HEALTH CARE EDUCATION/TRAINING PROGRAM

## 2025-07-07 PROCEDURE — 2500000004 HC RX 250 GENERAL PHARMACY W/ HCPCS (ALT 636 FOR OP/ED): Performed by: STUDENT IN AN ORGANIZED HEALTH CARE EDUCATION/TRAINING PROGRAM

## 2025-07-07 PROCEDURE — 96374 THER/PROPH/DIAG INJ IV PUSH: CPT | Mod: 59

## 2025-07-07 PROCEDURE — 85025 COMPLETE CBC W/AUTO DIFF WBC: CPT | Performed by: STUDENT IN AN ORGANIZED HEALTH CARE EDUCATION/TRAINING PROGRAM

## 2025-07-07 PROCEDURE — 74177 CT ABD & PELVIS W/CONTRAST: CPT | Performed by: RADIOLOGY

## 2025-07-07 PROCEDURE — 80048 BASIC METABOLIC PNL TOTAL CA: CPT | Performed by: STUDENT IN AN ORGANIZED HEALTH CARE EDUCATION/TRAINING PROGRAM

## 2025-07-07 PROCEDURE — 99285 EMERGENCY DEPT VISIT HI MDM: CPT | Mod: 25 | Performed by: STUDENT IN AN ORGANIZED HEALTH CARE EDUCATION/TRAINING PROGRAM

## 2025-07-07 PROCEDURE — 82248 BILIRUBIN DIRECT: CPT | Performed by: STUDENT IN AN ORGANIZED HEALTH CARE EDUCATION/TRAINING PROGRAM

## 2025-07-07 PROCEDURE — 83690 ASSAY OF LIPASE: CPT | Performed by: STUDENT IN AN ORGANIZED HEALTH CARE EDUCATION/TRAINING PROGRAM

## 2025-07-07 PROCEDURE — 96361 HYDRATE IV INFUSION ADD-ON: CPT

## 2025-07-07 PROCEDURE — 93005 ELECTROCARDIOGRAM TRACING: CPT

## 2025-07-07 PROCEDURE — 36415 COLL VENOUS BLD VENIPUNCTURE: CPT | Performed by: STUDENT IN AN ORGANIZED HEALTH CARE EDUCATION/TRAINING PROGRAM

## 2025-07-07 PROCEDURE — 2550000001 HC RX 255 CONTRASTS: Performed by: STUDENT IN AN ORGANIZED HEALTH CARE EDUCATION/TRAINING PROGRAM

## 2025-07-07 PROCEDURE — 83605 ASSAY OF LACTIC ACID: CPT | Performed by: STUDENT IN AN ORGANIZED HEALTH CARE EDUCATION/TRAINING PROGRAM

## 2025-07-07 PROCEDURE — 74177 CT ABD & PELVIS W/CONTRAST: CPT

## 2025-07-07 RX ORDER — ONDANSETRON HYDROCHLORIDE 2 MG/ML
4 INJECTION, SOLUTION INTRAVENOUS ONCE
Status: COMPLETED | OUTPATIENT
Start: 2025-07-07 | End: 2025-07-07

## 2025-07-07 RX ORDER — MORPHINE SULFATE 4 MG/ML
4 INJECTION INTRAVENOUS ONCE
Status: DISCONTINUED | OUTPATIENT
Start: 2025-07-07 | End: 2025-07-07

## 2025-07-07 RX ADMIN — ONDANSETRON 4 MG: 2 INJECTION, SOLUTION INTRAMUSCULAR; INTRAVENOUS at 13:32

## 2025-07-07 RX ADMIN — SODIUM CHLORIDE 1000 ML: 0.9 INJECTION, SOLUTION INTRAVENOUS at 13:32

## 2025-07-07 RX ADMIN — IOHEXOL 75 ML: 350 INJECTION, SOLUTION INTRAVENOUS at 15:28

## 2025-07-07 ASSESSMENT — PAIN - FUNCTIONAL ASSESSMENT: PAIN_FUNCTIONAL_ASSESSMENT: 0-10

## 2025-07-07 ASSESSMENT — LIFESTYLE VARIABLES
EVER FELT BAD OR GUILTY ABOUT YOUR DRINKING: NO
EVER HAD A DRINK FIRST THING IN THE MORNING TO STEADY YOUR NERVES TO GET RID OF A HANGOVER: NO
TOTAL SCORE: 0
HAVE PEOPLE ANNOYED YOU BY CRITICIZING YOUR DRINKING: NO
HAVE YOU EVER FELT YOU SHOULD CUT DOWN ON YOUR DRINKING: NO

## 2025-07-07 ASSESSMENT — PAIN DESCRIPTION - PAIN TYPE: TYPE: ACUTE PAIN

## 2025-07-07 ASSESSMENT — PAIN DESCRIPTION - ORIENTATION: ORIENTATION: LEFT;LOWER

## 2025-07-07 ASSESSMENT — PAIN DESCRIPTION - LOCATION: LOCATION: ABDOMEN

## 2025-07-07 ASSESSMENT — PAIN SCALES - GENERAL: PAINLEVEL_OUTOF10: 7
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
## 2025-07-07 NOTE — ED TRIAGE NOTES
Pt presents for abdominal pain that started mid June when she was lifting a heavy pot. She had hernia surgery in March. She states that she has pain on the LLQ. Denies vomiting and does not have associated nausea. She denies fever or chills. Not currently on chemo or radiation. Alert and oriented x's 4.

## 2025-07-08 ENCOUNTER — APPOINTMENT (OUTPATIENT)
Dept: RADIOLOGY | Facility: CLINIC | Age: 60
End: 2025-07-08
Payer: COMMERCIAL

## 2025-07-08 DIAGNOSIS — Z12.31 ENCOUNTER FOR SCREENING MAMMOGRAM FOR MALIGNANT NEOPLASM OF BREAST: ICD-10-CM

## 2025-07-08 LAB
ATRIAL RATE: 68 BPM
P AXIS: 65 DEGREES
PR INTERVAL: 139 MS
Q ONSET: 253 MS
QRS COUNT: 10 BEATS
QRS DURATION: 103 MS
QT INTERVAL: 437 MS
QTC CALCULATION(BAZETT): 465 MS
QTC FREDERICIA: 455 MS
R AXIS: 49 DEGREES
T AXIS: 35 DEGREES
T OFFSET: 472 MS
VENTRICULAR RATE: 68 BPM

## 2025-07-08 PROCEDURE — 77063 BREAST TOMOSYNTHESIS BI: CPT | Performed by: RADIOLOGY

## 2025-07-08 PROCEDURE — 77067 SCR MAMMO BI INCL CAD: CPT

## 2025-07-08 PROCEDURE — 77067 SCR MAMMO BI INCL CAD: CPT | Performed by: RADIOLOGY

## 2025-07-11 DIAGNOSIS — M25.561 CHRONIC PAIN OF RIGHT KNEE: Primary | ICD-10-CM

## 2025-07-11 DIAGNOSIS — G89.29 CHRONIC PAIN OF RIGHT KNEE: Primary | ICD-10-CM

## 2025-07-11 DIAGNOSIS — M17.11 OSTEOARTHRITIS OF RIGHT KNEE, UNSPECIFIED OSTEOARTHRITIS TYPE: ICD-10-CM

## 2025-07-15 ENCOUNTER — APPOINTMENT (OUTPATIENT)
Dept: SURGERY | Facility: CLINIC | Age: 60
End: 2025-07-15
Payer: COMMERCIAL

## 2025-07-15 VITALS
SYSTOLIC BLOOD PRESSURE: 104 MMHG | BODY MASS INDEX: 27 KG/M2 | WEIGHT: 168 LBS | TEMPERATURE: 97.4 F | OXYGEN SATURATION: 98 % | HEIGHT: 66 IN | DIASTOLIC BLOOD PRESSURE: 70 MMHG | RESPIRATION RATE: 17 BRPM | HEART RATE: 69 BPM

## 2025-07-15 DIAGNOSIS — Z87.19 HISTORY OF ABDOMINAL HERNIA: Primary | ICD-10-CM

## 2025-07-15 PROCEDURE — 99214 OFFICE O/P EST MOD 30 MIN: CPT | Performed by: SURGERY

## 2025-07-15 PROCEDURE — 3074F SYST BP LT 130 MM HG: CPT | Performed by: SURGERY

## 2025-07-15 PROCEDURE — 3008F BODY MASS INDEX DOCD: CPT | Performed by: SURGERY

## 2025-07-15 PROCEDURE — 3078F DIAST BP <80 MM HG: CPT | Performed by: SURGERY
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
## 2025-07-17 DIAGNOSIS — F32.A DEPRESSION, UNSPECIFIED DEPRESSION TYPE: ICD-10-CM

## 2025-07-17 DIAGNOSIS — F41.9 ANXIETY: ICD-10-CM

## 2025-07-18 RX ORDER — ESCITALOPRAM OXALATE 5 MG/1
5 TABLET ORAL DAILY
Qty: 90 TABLET | Refills: 1 | Status: SHIPPED | OUTPATIENT
Start: 2025-07-18

## 2025-07-18 NOTE — H&P (VIEW-ONLY)
Subjective   Patient ID: Betsy Sams is a 59 y.o. female who was referred by her PCP for Nausea.    Patient's PCP is Dr. Bahman Montiel    PMH: subclavian steal syndrome, PAD, HLD, adrenal insufficiency, DM2, hypothyroidism, CKD, stage 4 lung cancer with mets to brain, anxiety/depression, TOAN    HPI  Patient has been seen by myself on 3/28/2024 for abdominal pain that improved with a BM along with nausea. She reported irregular bowel habits, but at that time diarrhea had improved with Trulicity. Colonoscoy ordered due to history of colon polyps, although not done.    Patient was referred in January 2025. She was recently in the ER on 7/7/2025 for abdominal pain. She was reported left sided abdominal pain after lifting a heavy pot. She walso reported nausea, constipation, and diarrhea. The pain was worse with movement. Troponin, lactate, LFT, lipase, BMP, CBC unrevealing. CT scan was negative for acute abnormlities.    Patient states that she has intermittent nausea and vomiting for years. She states that it occurs randomly. She has noticed issues overnight at times but then other times can occur randomly in the day. No particular food triggers identified. She states that she does have issues with nausea in cars and planes. She states that she uses dissolvable zofran, which does help.  No hematemesis or coffee ground emesis. She continues to have issues with intermittent diarrhea that occurs randomly. She is using imodium with improvement. She denies significant weight loss, melena, hematochezia. She states that she has an upcoming MRI of her brain for surveillance. Last brain MRI done in October 2024 showed unchanged foci of enhancement within the right occipital lobe and bone.     Prior GI evaluation:  CT scan 7/7/25: negative for acute abnormalities    Review of Systems:  Constitutional: Denies significant unintentional weight loss  GI: +intermittent nausea, vomiting, diarrhea    Medications:  Prior to  "Admission medications   Medication Sig Start Date End Date Taking? Authorizing Provider   acetaminophen (Tylenol) 325 mg tablet Take 3 tablets (975 mg) by mouth every 8 hours. 3/15/25   Kathy Manzano PA-C   alcohol swabs pads, medicated use as directed once daily 6/20/22   Historical Provider, MD   aspirin 81 mg chewable tablet Chew and swallow 1 tablet (81 mg) once daily. 2/19/20   Historical Provider, MD   atorvastatin (Lipitor) 20 mg tablet Take 1 tablet (20 mg) by mouth once daily. 1/14/25 7/13/25  Bahman Montiel, DO   BD Luer-Haily Syringe 3 mL 25 gauge x 1\" syringe For injection as needed 4/3/25   Libra Gillespie MD   clopidogrel (Plavix) 75 mg tablet Take 1 tablet (75 mg) by mouth once daily. 1/14/25   Bahman Montiel DO   dexAMETHasone 4 mg/mL injection inject 1 MILLILITER (4 MG) FOR 1 DOSE IN CASE OF EMERGENCY 6/22/24   Historical Provider, MD   diclofenac (Voltaren) 75 mg EC tablet Take 1 tablet (75 mg) by mouth once daily as needed (pain). Do not crush, chew, or split. 6/24/25   Bahman Montiel, DO   dulaglutide (Trulicity) 1.5 mg/0.5 mL pen injector injection Inject 1.5 mg under the skin 1 (one) time per week. 5/8/25 5/8/26  Libra Gillespie MD   ergocalciferol (Vitamin D-2) 1250 mcg (50,000 units) capsule TAKE 1 CAPSULE BY MOUTH 1 TIME PER WEEK 6/23/25   Bahman Montiel DO   escitalopram (Lexapro) 5 mg tablet Take 1 tablet (5 mg) by mouth once daily. 1/14/25 7/13/25  Bahman Montiel DO   famotidine (Pepcid) 20 mg tablet Take 1 tablet (20 mg) by mouth 2 times a day. 7/18/23 3/13/25  Patti Knowles MD   fluticasone (Flonase) 50 mcg/actuation nasal spray Administer 1 spray into each nostril once daily. Shake gently. Before first use, prime pump. After use, clean tip and replace cap. 8/30/24 8/30/25  Sheryl Herndon PA-C   hydrocortisone (Cortef) 10 mg tablet 15mg in the morning and 5mg in the afternoon, double the dose during sick days 5/8/25   Libra Gillespie MD   levothyroxine " "(Synthroid, Levoxyl) 137 mcg tablet Take 1 tablet (137 mcg) by mouth early in the morning.. 5/8/25 5/8/26  Libra Gillespie MD   lidocaine HCl-hydrocortison ac (Radiaura) 3-0.5 % cream cream Apply 1 Application topically 2 times a day. 1/14/25   Bahman Montiel DO   methylPREDNISolone sod succinate, PF, (SOLU-Medrol) 40 mg/mL injection Inject once as needed if unable to take PO hydrocortisone then go to the ED 4/10/25   Libra Gillespie MD   methylPREDNISolone sodium succinate (SOLU-Medrol) 40 mg injection 40mg injection as needed if unable to tolerate hydrocortisone by mouth then go to the ED 4/8/25   Libra Gillespie MD   Microlet Lancet misc use 1 LANCET to TEST BLOOD SUGAR once daily as directed 12/22/22   Historical Provider, MD   needle, disp, 20 G 20 gauge x 1\" needle 40mg injection as needed if unable to tolerate hydrocortisone by mouth then go to the ED 4/8/25   Libra Gillespie MD   ondansetron ODT (Zofran-ODT) 4 mg disintegrating tablet Dissolve 1 tablet (4 mg) in the mouth every 8 hours if needed for nausea or vomiting. 6/24/25   Bahman Montiel DO   OneTouch Ultra Test strip Bg check daily 12/12/24   Libra Gillespie MD   pantoprazole (ProtoNix) 40 mg EC tablet Take 1 tablet (40 mg) by mouth once daily in the morning. Take before meals. 30 MINUTES PRIOR TO BREAKFAST 1/14/25 7/13/25  Bahman Montiel DO   sterile water injection 1ml to reconstitute with methylprednisolone as needed 4/10/25   Libra Gillespie MD   syringe with needle 1 mL 25 gauge x 1\" syringe 40mg injection as needed if unable to tolerate hydrocortisone by mouth then go to the ED 4/8/25   Libra Gillespie MD   traZODone (Desyrel) 50 mg tablet Take 1 tablet (50 mg) by mouth as needed at bedtime for sleep. 9/13/23 3/14/25  Bahman Montiel DO   Vascepa 1 gram capsule Take 2 capsules (2 g) by mouth 2 times daily (morning and late afternoon). 5/8/25 5/8/26  Libra Gillespie MD       Allergies:  Naproxen    Objective "   Physical exam:  Constitutional: Well developed, well nourished 59 y.o. year old in no acute distress.   Skin: Warm and dry. Normal turgor. No rash, ulcer, trauma, jaundice.   Eyes: Pupils symmetric and reactive to light.  Respiratory: Clear to auscultation bilaterally. No wheezes, rhonchi, or rales heard.  Cardiovascular: Regular rate and rhythm. S1 and S2 appreciated and normal. No murmur, rub, or gallop heard.   Edema: No edema noted.  GI: Normal bowel sounds. Soft, non-distended, TTP in suprapubic region. No rebound or guarding present. No hepatomegaly or splenomegaly appreciated. Abdominal aorta not palpably abnormal.  Musculoskeletal: Limbs and Joints grossly normal. Full range of motion in major joint.   Neuro: Alert and oriented x 3. Cranial nerves 2-12 grossly intact.   Psych: Normal mood and affect.        Relevant Results Recent labs reviewed in the EMR.    Radiology: Reviewed imaging reviewed in the EMR.  Imaging  No results found.    Cardiology, Vascular, and Other Imaging  No other imaging results found for the past 7 days      Assessment/Plan   Problem List Items Addressed This Visit           ICD-10-CM    Irregular bowel habits R19.8    Suspect related to IBS-D. Labs and stool studies ordered to evaluate for infection, inflammation, celiac disease, EPI. Colonoscopy previously ordered, but not done. I reordered this. I recommend a high fiber diet. I recommended she use imodium as needed for the diarrhea. She states that she doesn't like to use it very often.         Relevant Medications    polyethylene glycol (Golytely) 236-22.74-6.74 -5.86 gram solution    Other Relevant Orders    Colonoscopy Diagnostic    C-Reactive Protein    Calprotectin, Fecal    Pancreatic Elastase, Fecal    TSH with reflex to Free T4 if abnormal    Tissue Transglutaminase IgA    Nausea and vomiting - Primary R11.2    Intermittent and ongoing for years. May be related to metastatic malignancy/prior gamma knife treatment. She is  a diabetic so gastroparesis is possible. Other possibilities include GERD, PUD, H. Pylori infection. She has been on PPI chronically. EGD ordered for further evaluation. If negative, recommend gastric emptying study. Refills of zofran sent.         Relevant Orders    Esophagogastroduodenoscopy (EGD)       Meds to hold: plavix, trulicity  Keisha Chacon PA-C

## 2025-07-18 NOTE — PROGRESS NOTES
Subjective   Patient ID: Betsy Sams is a 59 y.o. female who was referred by her PCP for Nausea.    Patient's PCP is Dr. Bahman Montiel    PMH: subclavian steal syndrome, PAD, HLD, adrenal insufficiency, DM2, hypothyroidism, CKD, stage 4 lung cancer with mets to brain, anxiety/depression, TOAN    HPI  Patient has been seen by myself on 3/28/2024 for abdominal pain that improved with a BM along with nausea. She reported irregular bowel habits, but at that time diarrhea had improved with Trulicity. Colonoscoy ordered due to history of colon polyps, although not done.    Patient was referred in January 2025. She was recently in the ER on 7/7/2025 for abdominal pain. She was reported left sided abdominal pain after lifting a heavy pot. She walso reported nausea, constipation, and diarrhea. The pain was worse with movement. Troponin, lactate, LFT, lipase, BMP, CBC unrevealing. CT scan was negative for acute abnormlities.    Patient states that she has intermittent nausea and vomiting for years. She states that it occurs randomly. She has noticed issues overnight at times but then other times can occur randomly in the day. No particular food triggers identified. She states that she does have issues with nausea in cars and planes. She states that she uses dissolvable zofran, which does help.  No hematemesis or coffee ground emesis. She continues to have issues with intermittent diarrhea that occurs randomly. She is using imodium with improvement. She denies significant weight loss, melena, hematochezia. She states that she has an upcoming MRI of her brain for surveillance. Last brain MRI done in October 2024 showed unchanged foci of enhancement within the right occipital lobe and bone.     Prior GI evaluation:  CT scan 7/7/25: negative for acute abnormalities    Review of Systems:  Constitutional: Denies significant unintentional weight loss  GI: +intermittent nausea, vomiting, diarrhea    Medications:  Prior to  "Admission medications   Medication Sig Start Date End Date Taking? Authorizing Provider   acetaminophen (Tylenol) 325 mg tablet Take 3 tablets (975 mg) by mouth every 8 hours. 3/15/25   Kathy Manzano PA-C   alcohol swabs pads, medicated use as directed once daily 6/20/22   Historical Provider, MD   aspirin 81 mg chewable tablet Chew and swallow 1 tablet (81 mg) once daily. 2/19/20   Historical Provider, MD   atorvastatin (Lipitor) 20 mg tablet Take 1 tablet (20 mg) by mouth once daily. 1/14/25 7/13/25  Bahman Montiel, DO   BD Luer-Haily Syringe 3 mL 25 gauge x 1\" syringe For injection as needed 4/3/25   Libra Gillespie MD   clopidogrel (Plavix) 75 mg tablet Take 1 tablet (75 mg) by mouth once daily. 1/14/25   Bahman Montiel DO   dexAMETHasone 4 mg/mL injection inject 1 MILLILITER (4 MG) FOR 1 DOSE IN CASE OF EMERGENCY 6/22/24   Historical Provider, MD   diclofenac (Voltaren) 75 mg EC tablet Take 1 tablet (75 mg) by mouth once daily as needed (pain). Do not crush, chew, or split. 6/24/25   Bahman Montiel, DO   dulaglutide (Trulicity) 1.5 mg/0.5 mL pen injector injection Inject 1.5 mg under the skin 1 (one) time per week. 5/8/25 5/8/26  Libra Gillespie MD   ergocalciferol (Vitamin D-2) 1250 mcg (50,000 units) capsule TAKE 1 CAPSULE BY MOUTH 1 TIME PER WEEK 6/23/25   Bhaman Montiel DO   escitalopram (Lexapro) 5 mg tablet Take 1 tablet (5 mg) by mouth once daily. 1/14/25 7/13/25  Bahman Montiel DO   famotidine (Pepcid) 20 mg tablet Take 1 tablet (20 mg) by mouth 2 times a day. 7/18/23 3/13/25  Patti Knowles MD   fluticasone (Flonase) 50 mcg/actuation nasal spray Administer 1 spray into each nostril once daily. Shake gently. Before first use, prime pump. After use, clean tip and replace cap. 8/30/24 8/30/25  Sheryl Herndon PA-C   hydrocortisone (Cortef) 10 mg tablet 15mg in the morning and 5mg in the afternoon, double the dose during sick days 5/8/25   Libra Gillespie MD   levothyroxine " "(Synthroid, Levoxyl) 137 mcg tablet Take 1 tablet (137 mcg) by mouth early in the morning.. 5/8/25 5/8/26  Libra Gillespie MD   lidocaine HCl-hydrocortison ac (Radiaura) 3-0.5 % cream cream Apply 1 Application topically 2 times a day. 1/14/25   Bahman Montiel DO   methylPREDNISolone sod succinate, PF, (SOLU-Medrol) 40 mg/mL injection Inject once as needed if unable to take PO hydrocortisone then go to the ED 4/10/25   Libra Gillespie MD   methylPREDNISolone sodium succinate (SOLU-Medrol) 40 mg injection 40mg injection as needed if unable to tolerate hydrocortisone by mouth then go to the ED 4/8/25   Libra Gillespie MD   Microlet Lancet misc use 1 LANCET to TEST BLOOD SUGAR once daily as directed 12/22/22   Historical Provider, MD   needle, disp, 20 G 20 gauge x 1\" needle 40mg injection as needed if unable to tolerate hydrocortisone by mouth then go to the ED 4/8/25   Libra Gillespie MD   ondansetron ODT (Zofran-ODT) 4 mg disintegrating tablet Dissolve 1 tablet (4 mg) in the mouth every 8 hours if needed for nausea or vomiting. 6/24/25   Bahman Montiel DO   OneTouch Ultra Test strip Bg check daily 12/12/24   Libra Gillespie MD   pantoprazole (ProtoNix) 40 mg EC tablet Take 1 tablet (40 mg) by mouth once daily in the morning. Take before meals. 30 MINUTES PRIOR TO BREAKFAST 1/14/25 7/13/25  Bahman Montiel DO   sterile water injection 1ml to reconstitute with methylprednisolone as needed 4/10/25   Libra Gillespie MD   syringe with needle 1 mL 25 gauge x 1\" syringe 40mg injection as needed if unable to tolerate hydrocortisone by mouth then go to the ED 4/8/25   Libra Gillespie MD   traZODone (Desyrel) 50 mg tablet Take 1 tablet (50 mg) by mouth as needed at bedtime for sleep. 9/13/23 3/14/25  Bahman Montiel DO   Vascepa 1 gram capsule Take 2 capsules (2 g) by mouth 2 times daily (morning and late afternoon). 5/8/25 5/8/26  Libra Gillespie MD       Allergies:  Naproxen    Objective "   Physical exam:  Constitutional: Well developed, well nourished 59 y.o. year old in no acute distress.   Skin: Warm and dry. Normal turgor. No rash, ulcer, trauma, jaundice.   Eyes: Pupils symmetric and reactive to light.  Respiratory: Clear to auscultation bilaterally. No wheezes, rhonchi, or rales heard.  Cardiovascular: Regular rate and rhythm. S1 and S2 appreciated and normal. No murmur, rub, or gallop heard.   Edema: No edema noted.  GI: Normal bowel sounds. Soft, non-distended, TTP in suprapubic region. No rebound or guarding present. No hepatomegaly or splenomegaly appreciated. Abdominal aorta not palpably abnormal.  Musculoskeletal: Limbs and Joints grossly normal. Full range of motion in major joint.   Neuro: Alert and oriented x 3. Cranial nerves 2-12 grossly intact.   Psych: Normal mood and affect.        Relevant Results Recent labs reviewed in the EMR.    Radiology: Reviewed imaging reviewed in the EMR.  Imaging  No results found.    Cardiology, Vascular, and Other Imaging  No other imaging results found for the past 7 days      Assessment/Plan   Problem List Items Addressed This Visit           ICD-10-CM    Irregular bowel habits R19.8    Suspect related to IBS-D. Labs and stool studies ordered to evaluate for infection, inflammation, celiac disease, EPI. Colonoscopy previously ordered, but not done. I reordered this. I recommend a high fiber diet. I recommended she use imodium as needed for the diarrhea. She states that she doesn't like to use it very often.         Relevant Medications    polyethylene glycol (Golytely) 236-22.74-6.74 -5.86 gram solution    Other Relevant Orders    Colonoscopy Diagnostic    C-Reactive Protein    Calprotectin, Fecal    Pancreatic Elastase, Fecal    TSH with reflex to Free T4 if abnormal    Tissue Transglutaminase IgA    Nausea and vomiting - Primary R11.2    Intermittent and ongoing for years. May be related to metastatic malignancy/prior gamma knife treatment. She is  a diabetic so gastroparesis is possible. Other possibilities include GERD, PUD, H. Pylori infection. She has been on PPI chronically. EGD ordered for further evaluation. If negative, recommend gastric emptying study. Refills of zofran sent.         Relevant Orders    Esophagogastroduodenoscopy (EGD)       Meds to hold: plavix, trulicity  Keisha Chacon PA-C

## 2025-07-21 ENCOUNTER — APPOINTMENT (OUTPATIENT)
Facility: CLINIC | Age: 60
End: 2025-07-21
Payer: COMMERCIAL

## 2025-07-21 VITALS
DIASTOLIC BLOOD PRESSURE: 77 MMHG | BODY MASS INDEX: 27.32 KG/M2 | OXYGEN SATURATION: 95 % | SYSTOLIC BLOOD PRESSURE: 116 MMHG | HEIGHT: 66 IN | HEART RATE: 75 BPM | WEIGHT: 170 LBS

## 2025-07-21 DIAGNOSIS — R11.0 NAUSEA: ICD-10-CM

## 2025-07-21 DIAGNOSIS — R19.8 IRREGULAR BOWEL HABITS: ICD-10-CM

## 2025-07-21 DIAGNOSIS — R11.2 NAUSEA AND VOMITING, UNSPECIFIED VOMITING TYPE: Primary | ICD-10-CM

## 2025-07-21 PROCEDURE — 99214 OFFICE O/P EST MOD 30 MIN: CPT | Performed by: PHYSICIAN ASSISTANT

## 2025-07-21 PROCEDURE — 3074F SYST BP LT 130 MM HG: CPT | Performed by: PHYSICIAN ASSISTANT

## 2025-07-21 PROCEDURE — 3078F DIAST BP <80 MM HG: CPT | Performed by: PHYSICIAN ASSISTANT

## 2025-07-21 PROCEDURE — 3008F BODY MASS INDEX DOCD: CPT | Performed by: PHYSICIAN ASSISTANT

## 2025-07-21 RX ORDER — POLYETHYLENE GLYCOL 3350, SODIUM SULFATE ANHYDROUS, SODIUM BICARBONATE, SODIUM CHLORIDE, POTASSIUM CHLORIDE 236; 22.74; 6.74; 5.86; 2.97 G/4L; G/4L; G/4L; G/4L; G/4L
4 POWDER, FOR SOLUTION ORAL ONCE
Qty: 4000 ML | Refills: 0 | Status: SHIPPED | OUTPATIENT
Start: 2025-07-21 | End: 2025-07-21

## 2025-07-21 RX ORDER — ONDANSETRON 4 MG/1
4 TABLET, ORALLY DISINTEGRATING ORAL EVERY 8 HOURS PRN
Qty: 30 TABLET | Refills: 3 | Status: SHIPPED | OUTPATIENT
Start: 2025-07-21

## 2025-07-21 NOTE — ASSESSMENT & PLAN NOTE
Suspect related to IBS-D. Labs and stool studies ordered to evaluate for infection, inflammation, celiac disease, EPI. Colonoscopy previously ordered, but not done. I reordered this. I recommend a high fiber diet. I recommended she use imodium as needed for the diarrhea. She states that she doesn't like to use it very often.

## 2025-07-21 NOTE — PATIENT INSTRUCTIONS
Thank you for coming in for your appointment.   -Get labs and stool studies done  -Get EGD colonoscopy done  -Try daily fiber supplement, such as metamucil or benefiber    Meds to hold: Plavix x 5-7 days, Trulicity x 1 dose    Call [phone] with questions or concerns.

## 2025-07-21 NOTE — ASSESSMENT & PLAN NOTE
Intermittent and ongoing for years. May be related to metastatic malignancy/prior gamma knife treatment. She is a diabetic so gastroparesis is possible. Other possibilities include GERD, PUD, H. Pylori infection. She has been on PPI chronically. EGD ordered for further evaluation. If negative, recommend gastric emptying study. Refills of zofran sent.

## 2025-07-26 DIAGNOSIS — E78.1 HYPERTRIGLYCERIDEMIA: ICD-10-CM

## 2025-07-27 RX ORDER — ATORVASTATIN CALCIUM 20 MG/1
20 TABLET, FILM COATED ORAL DAILY
Qty: 90 TABLET | Refills: 1 | Status: SHIPPED | OUTPATIENT
Start: 2025-07-27

## 2025-07-28 ENCOUNTER — TELEPHONE (OUTPATIENT)
Dept: PREADMISSION TESTING | Facility: HOSPITAL | Age: 60
End: 2025-07-28

## 2025-07-29 DIAGNOSIS — K21.9 GASTROESOPHAGEAL REFLUX DISEASE, UNSPECIFIED WHETHER ESOPHAGITIS PRESENT: ICD-10-CM

## 2025-07-30 ENCOUNTER — ANESTHESIA EVENT (OUTPATIENT)
Dept: OPERATING ROOM | Facility: HOSPITAL | Age: 60
End: 2025-07-30
Payer: COMMERCIAL

## 2025-07-30 RX ORDER — NITROGLYCERIN 20 MG/G
0.5 OINTMENT TOPICAL ONCE
Status: CANCELLED | OUTPATIENT
Start: 2025-08-07

## 2025-07-30 RX ORDER — PANTOPRAZOLE SODIUM 40 MG/1
TABLET, DELAYED RELEASE ORAL
Qty: 90 TABLET | Refills: 0 | Status: SHIPPED | OUTPATIENT
Start: 2025-07-30

## 2025-07-30 RX ORDER — IPRATROPIUM BROMIDE AND ALBUTEROL SULFATE 2.5; .5 MG/3ML; MG/3ML
3 SOLUTION RESPIRATORY (INHALATION) ONCE
Status: CANCELLED | OUTPATIENT
Start: 2025-08-07

## 2025-08-02 LAB
CRP SERPL-MCNC: 5.5 MG/L
TSH SERPL-ACNC: 0.78 MIU/L (ref 0.4–4.5)
TTG IGA SER-ACNC: NORMAL

## 2025-08-04 ENCOUNTER — HOSPITAL ENCOUNTER (OUTPATIENT)
Dept: RADIOLOGY | Facility: EXTERNAL LOCATION | Age: 60
Discharge: HOME | End: 2025-08-04

## 2025-08-04 ENCOUNTER — APPOINTMENT (OUTPATIENT)
Dept: ORTHOPEDIC SURGERY | Age: 60
End: 2025-08-04
Payer: COMMERCIAL

## 2025-08-04 VITALS — HEIGHT: 66 IN | WEIGHT: 168 LBS | BODY MASS INDEX: 27 KG/M2

## 2025-08-04 DIAGNOSIS — M17.11 PRIMARY OSTEOARTHRITIS OF RIGHT KNEE: Primary | ICD-10-CM

## 2025-08-04 DIAGNOSIS — E11.9 TYPE 2 DIABETES MELLITUS WITHOUT COMPLICATION, WITHOUT LONG-TERM CURRENT USE OF INSULIN: Primary | ICD-10-CM

## 2025-08-04 DIAGNOSIS — M25.551 RIGHT HIP PAIN: ICD-10-CM

## 2025-08-04 DIAGNOSIS — M76.01 GLUTEAL TENDINITIS OF RIGHT BUTTOCK: ICD-10-CM

## 2025-08-04 LAB
CRP SERPL-MCNC: 5.5 MG/L
TSH SERPL-ACNC: 0.78 MIU/L (ref 0.4–4.5)
TTG IGA SER-ACNC: <1 U/ML

## 2025-08-04 PROCEDURE — 99204 OFFICE O/P NEW MOD 45 MIN: CPT | Performed by: EMERGENCY MEDICINE

## 2025-08-04 PROCEDURE — 20611 DRAIN/INJ JOINT/BURSA W/US: CPT | Performed by: EMERGENCY MEDICINE

## 2025-08-04 PROCEDURE — 3008F BODY MASS INDEX DOCD: CPT | Performed by: EMERGENCY MEDICINE

## 2025-08-04 RX ORDER — METHYLPREDNISOLONE ACETATE 40 MG/ML
80 INJECTION, SUSPENSION INTRA-ARTICULAR; INTRALESIONAL; INTRAMUSCULAR; SOFT TISSUE
Status: COMPLETED | OUTPATIENT
Start: 2025-08-04 | End: 2025-08-04

## 2025-08-04 RX ORDER — LANCETS
EACH MISCELLANEOUS
Qty: 100 EACH | Refills: 1 | Status: SHIPPED | OUTPATIENT
Start: 2025-08-04

## 2025-08-04 RX ORDER — BLOOD SUGAR DIAGNOSTIC
STRIP MISCELLANEOUS
Qty: 100 STRIP | Refills: 3 | Status: SHIPPED | OUTPATIENT
Start: 2025-08-04

## 2025-08-04 RX ORDER — LIDOCAINE HYDROCHLORIDE 10 MG/ML
4 INJECTION, SOLUTION INFILTRATION; PERINEURAL
Status: COMPLETED | OUTPATIENT
Start: 2025-08-04 | End: 2025-08-04

## 2025-08-04 RX ORDER — BLOOD-GLUCOSE METER
EACH MISCELLANEOUS
Qty: 1 EACH | Refills: 0 | Status: SHIPPED | OUTPATIENT
Start: 2025-08-04

## 2025-08-04 RX ADMIN — METHYLPREDNISOLONE ACETATE 80 MG: 40 INJECTION, SUSPENSION INTRA-ARTICULAR; INTRALESIONAL; INTRAMUSCULAR; SOFT TISSUE at 12:45

## 2025-08-04 RX ADMIN — LIDOCAINE HYDROCHLORIDE 4 ML: 10 INJECTION, SOLUTION INFILTRATION; PERINEURAL at 12:45

## 2025-08-04 ASSESSMENT — PAIN SCALES - GENERAL: PAINLEVEL_OUTOF10: 8

## 2025-08-04 ASSESSMENT — PAIN - FUNCTIONAL ASSESSMENT: PAIN_FUNCTIONAL_ASSESSMENT: 0-10

## 2025-08-04 ASSESSMENT — PAIN DESCRIPTION - DESCRIPTORS: DESCRIPTORS: SHARP;SHOOTING

## 2025-08-04 NOTE — PROGRESS NOTES
Subjective:  Chief Complaint: Pain of the Right Knee and Pain of the Right Hip       Patient ID: Betsy Sams is a 59 y.o. coming in with with right hip and knee pain. States pain has been ongoing the last few years. They do report history of trauma to the affected area(s), which include a slip and fall on the ice back in 2012 where she landed on her right hip. They deny having any numbness and tingling in their knee or hip. admits lower back pain. Pt states she did play a lot of sports when younger. Describes pain as sharp and stabbing.   XR done 08/04/252  Diabetic: no  Neuropathy: no  PREVIOUS TREATMENTS: None    Last Surgery: Laparoscopic Lysis Of Adhesions Converted To Open Incarcerated Incisional Hernia  Repair With Mesh   Last Surgery Date: 3/14/2025     Betsy is a very pleasant 59-year-old female who lives in [facility] and who is coming in with pain in her right lower extremity.  She states that she fell onto the lateral aspect of her right hip back in 2010-12 timeframe and that she has essentially had intermittent symptoms since that time.  All of her pain is located laterally over the right greater trochanter.  She has no pain going into her groin and she thinks that it is worse/related to the pain in her right knee which she has had for the past couple of years.  She states that she was very active when she was younger and played a lot of sports.  For the past couple of years she has noticed acute worsening of some chronic symptoms in the right knee with medial joint line pain that radiates posteriorly.  She has intermittent swelling and does have some popping from time to time.  She is on blood thinner so she is unable to do PRP.  She has not really tried any over-the-counter medications and has not yet tried any injections but would like to exhaust conservative treatment options prior to considering surgery.         Current Medications[1]     RX Allergies[2]     Objective:   Right Knee Exam      Muscle Strength   The patient has normal right knee strength.    Tenderness   The patient is experiencing tenderness in the medial joint line.    Range of Motion   The patient has normal right knee ROM.    Tests   Sanna:  Medial - positive   Drawer:  Anterior - negative    Posterior - negative    Other   Erythema: absent  Sensation: normal  Swelling: mild  Effusion: no effusion present    Comments:  Trace swelling.  No effusion.  Knee feels stable.  Some crepitus with range of motion testing      Left Knee Exam     Muscle Strength   The patient has normal left knee strength.      Right Hip Exam     Range of Motion   The patient has normal right hip ROM.  Abduction:  normal   Adduction:  normal   Extension:  normal   Flexion:  normal   External rotation:  normal   Internal rotation:  normal     Muscle Strength   The patient has normal right hip strength.    Tests   SCOTT: positive    Other   Erythema: absent  Sensation: normal    Comments:  Positive FADIR      Left Hip Exam   Left hip exam is normal.             Imaging Results:   X-rays of the right knee and right hip were reviewed and interpreted by me on 8/4/2025.  The patient has minimal degenerative changes in the right hip but does have moderate arthritic changes in the medial compartment of the right knee.  No evidence of acute injuries or fractures.    L Inj/Asp: R knee on 8/4/2025 12:45 PM  Indications: pain  Details: 22 G needle, ultrasound-guided superolateral approach  Medications: 80 mg methylPREDNISolone acetate 40 mg/mL; 4 mL lidocaine 10 mg/mL (1 %)  Outcome: tolerated well, no immediate complications  Procedure, treatment alternatives, risks and benefits explained, specific risks discussed. Consent was given by the patient. Immediately prior to procedure a time out was called to verify the correct patient, procedure, equipment, support staff and site/side marked as required. Patient was prepped and draped in the usual sterile fashion.         "    Assessment/Plan:  Referring Provider: Bahman Montiel DO    Encounter Diagnoses:   Primary osteoarthritis of right knee    Right hip pain    Gluteal tendinitis of right buttock     Orders Placed This Encounter    L Inj/Asp    XR hip right with pelvis when performed 2 or 3 views    Point of Care Ultrasound      No follow-ups on file.     We discussed the patient's treatment options at length and eventually decided to perform a right knee joint cortisone injection(s) under ultrasound guidance here today in the office.  The patient tolerated the procedure(s) well without any complications and activity modifications were reviewed. For pain control, the patient is going to use prescription dose(s) of Tylenol 3 times daily and Voltaren Gel 3 times daily .  The patient will follow-up with me in about 4 weeks to see how she is doing and whether or not we should consider gel injections.  We cannot do PRP since she is on blood thinners.  We will also consider doing a gluteal tendon sheath injection depending on how her hip is feeling at her follow-up visit.     ** Please excuse any errors in grammar or translation related to this dictation. Voice recognition software was utilized to prepare this document. **         Theron Mcneil MD  [facility]           [1]   Current Outpatient Medications   Medication Sig Dispense Refill    acetaminophen (Tylenol) 325 mg tablet Take 3 tablets (975 mg) by mouth every 8 hours.      alcohol swabs pads, medicated use as directed once daily      aspirin 81 mg chewable tablet Chew and swallow 1 tablet (81 mg) once daily.      atorvastatin (Lipitor) 20 mg tablet TAKE ONE TABLET BY MOUTH ONCE DAILY 90 tablet 1    BD Luer-Haily Syringe 3 mL 25 gauge x 1\" syringe For injection as needed 2 each 3    clopidogrel (Plavix) 75 mg tablet Take 1 tablet (75 mg) by mouth once daily. 90 tablet 1    dexAMETHasone 4 mg/mL injection inject 1 MILLILITER (4 MG) FOR 1 DOSE IN CASE " "OF EMERGENCY      diclofenac (Voltaren) 75 mg EC tablet Take 1 tablet (75 mg) by mouth once daily as needed (pain). Do not crush, chew, or split. 10 tablet 0    dulaglutide (Trulicity) 1.5 mg/0.5 mL pen injector injection Inject 1.5 mg under the skin 1 (one) time per week. 4 each 11    ergocalciferol (Vitamin D-2) 1250 mcg (50,000 units) capsule TAKE 1 CAPSULE BY MOUTH 1 TIME PER WEEK 12 capsule 0    escitalopram (Lexapro) 5 mg tablet TAKE ONE TABLET BY MOUTH ONCE DAILY 90 tablet 1    famotidine (Pepcid) 20 mg tablet Take 1 tablet (20 mg) by mouth 2 times a day. 60 tablet 5    fluticasone (Flonase) 50 mcg/actuation nasal spray Administer 1 spray into each nostril once daily. Shake gently. Before first use, prime pump. After use, clean tip and replace cap. 16 g 0    hydrocortisone (Cortef) 10 mg tablet 15mg in the morning and 5mg in the afternoon, double the dose during sick days 300 tablet 3    levothyroxine (Synthroid, Levoxyl) 137 mcg tablet Take 1 tablet (137 mcg) by mouth early in the morning.. 90 tablet 3    lidocaine HCl-hydrocortison ac (Radiaura) 3-0.5 % cream cream Apply 1 Application topically 2 times a day. 28.3 g 0    methylPREDNISolone sod succinate, PF, (SOLU-Medrol) 40 mg/mL injection Inject once as needed if unable to take PO hydrocortisone then go to the ED 1 each 3    methylPREDNISolone sodium succinate (SOLU-Medrol) 40 mg injection 40mg injection as needed if unable to tolerate hydrocortisone by mouth then go to the ED 1 each 3    Microlet Lancet misc use 1 LANCET to TEST BLOOD SUGAR once daily as directed      needle, disp, 20 G 20 gauge x 1\" needle 40mg injection as needed if unable to tolerate hydrocortisone by mouth then go to the ED 3 each 3    ondansetron ODT (Zofran-ODT) 4 mg disintegrating tablet Dissolve 1 tablet (4 mg) in the mouth every 8 hours if needed for nausea or vomiting. 30 tablet 3    OneTouch Ultra Test strip Bg check daily 100 strip 3    pantoprazole (ProtoNix) 40 mg EC tablet " "TAKE 1 TABLET BY MOUTH ONCE DAILY IN THE MORNING 30 MINUTES BEFORE BREAKFAST 90 tablet 0    sterile water injection 1ml to reconstitute with methylprednisolone as needed 10 mL 3    syringe with needle 1 mL 25 gauge x 1\" syringe 40mg injection as needed if unable to tolerate hydrocortisone by mouth then go to the ED 3 each 3    traZODone (Desyrel) 50 mg tablet Take 1 tablet (50 mg) by mouth as needed at bedtime for sleep. 90 tablet 1    Vascepa 1 gram capsule Take 2 capsules (2 g) by mouth 2 times daily (morning and late afternoon). 360 capsule 3     No current facility-administered medications for this visit.   [2]   Allergies  Allergen Reactions    Naproxen Nausea/vomiting     "

## 2025-08-07 ENCOUNTER — ANESTHESIA (OUTPATIENT)
Dept: OPERATING ROOM | Facility: HOSPITAL | Age: 60
End: 2025-08-07
Payer: COMMERCIAL

## 2025-08-07 ENCOUNTER — HOSPITAL ENCOUNTER (OUTPATIENT)
Dept: OPERATING ROOM | Facility: HOSPITAL | Age: 60
Discharge: HOME | End: 2025-08-07
Payer: COMMERCIAL

## 2025-08-07 VITALS
OXYGEN SATURATION: 95 % | BODY MASS INDEX: 27.32 KG/M2 | SYSTOLIC BLOOD PRESSURE: 137 MMHG | HEART RATE: 61 BPM | WEIGHT: 170 LBS | RESPIRATION RATE: 14 BRPM | TEMPERATURE: 97.5 F | HEIGHT: 66 IN | DIASTOLIC BLOOD PRESSURE: 86 MMHG

## 2025-08-07 DIAGNOSIS — R19.8 IRREGULAR BOWEL HABITS: ICD-10-CM

## 2025-08-07 DIAGNOSIS — R11.2 NAUSEA AND VOMITING, UNSPECIFIED VOMITING TYPE: ICD-10-CM

## 2025-08-07 PROCEDURE — 2500000004 HC RX 250 GENERAL PHARMACY W/ HCPCS (ALT 636 FOR OP/ED): Performed by: NURSE ANESTHETIST, CERTIFIED REGISTERED

## 2025-08-07 PROCEDURE — 7100000010 HC PHASE TWO TIME - EACH INCREMENTAL 1 MINUTE: Performed by: NURSE ANESTHETIST, CERTIFIED REGISTERED

## 2025-08-07 PROCEDURE — 7100000001 HC RECOVERY ROOM TIME - INITIAL BASE CHARGE: Performed by: NURSE ANESTHETIST, CERTIFIED REGISTERED

## 2025-08-07 PROCEDURE — 3700000002 HC GENERAL ANESTHESIA TIME - EACH INCREMENTAL 1 MINUTE: Performed by: NURSE ANESTHETIST, CERTIFIED REGISTERED

## 2025-08-07 PROCEDURE — 3700000001 HC GENERAL ANESTHESIA TIME - INITIAL BASE CHARGE: Performed by: NURSE ANESTHETIST, CERTIFIED REGISTERED

## 2025-08-07 PROCEDURE — 45380 COLONOSCOPY AND BIOPSY: CPT | Performed by: INTERNAL MEDICINE

## 2025-08-07 PROCEDURE — 3600000007 HC OR TIME - EACH INCREMENTAL 1 MINUTE - PROCEDURE LEVEL TWO: Performed by: NURSE ANESTHETIST, CERTIFIED REGISTERED

## 2025-08-07 PROCEDURE — 7100000002 HC RECOVERY ROOM TIME - EACH INCREMENTAL 1 MINUTE: Performed by: NURSE ANESTHETIST, CERTIFIED REGISTERED

## 2025-08-07 PROCEDURE — 7100000009 HC PHASE TWO TIME - INITIAL BASE CHARGE: Performed by: NURSE ANESTHETIST, CERTIFIED REGISTERED

## 2025-08-07 PROCEDURE — 2500000004 HC RX 250 GENERAL PHARMACY W/ HCPCS (ALT 636 FOR OP/ED): Performed by: ANESTHESIOLOGY

## 2025-08-07 PROCEDURE — 3600000002 HC OR TIME - INITIAL BASE CHARGE - PROCEDURE LEVEL TWO: Performed by: NURSE ANESTHETIST, CERTIFIED REGISTERED

## 2025-08-07 PROCEDURE — 43239 EGD BIOPSY SINGLE/MULTIPLE: CPT | Performed by: INTERNAL MEDICINE

## 2025-08-07 RX ORDER — GLYCOPYRROLATE 0.2 MG/ML
INJECTION INTRAMUSCULAR; INTRAVENOUS AS NEEDED
Status: DISCONTINUED | OUTPATIENT
Start: 2025-08-07 | End: 2025-08-07

## 2025-08-07 RX ORDER — SODIUM CHLORIDE 9 MG/ML
125 INJECTION, SOLUTION INTRAVENOUS CONTINUOUS
Status: DISCONTINUED | OUTPATIENT
Start: 2025-08-07 | End: 2025-08-08 | Stop reason: HOSPADM

## 2025-08-07 RX ORDER — PROPOFOL 10 MG/ML
INJECTION, EMULSION INTRAVENOUS AS NEEDED
Status: DISCONTINUED | OUTPATIENT
Start: 2025-08-07 | End: 2025-08-07

## 2025-08-07 RX ORDER — METOCLOPRAMIDE HYDROCHLORIDE 5 MG/ML
10 INJECTION INTRAMUSCULAR; INTRAVENOUS ONCE
Status: COMPLETED | OUTPATIENT
Start: 2025-08-07 | End: 2025-08-07

## 2025-08-07 RX ORDER — MIDAZOLAM HYDROCHLORIDE 1 MG/ML
INJECTION, SOLUTION INTRAMUSCULAR; INTRAVENOUS AS NEEDED
Status: DISCONTINUED | OUTPATIENT
Start: 2025-08-07 | End: 2025-08-07

## 2025-08-07 RX ORDER — FAMOTIDINE 10 MG/ML
20 INJECTION, SOLUTION INTRAVENOUS ONCE
Status: COMPLETED | OUTPATIENT
Start: 2025-08-07 | End: 2025-08-07

## 2025-08-07 RX ADMIN — MIDAZOLAM 2 MG: 1 INJECTION INTRAMUSCULAR; INTRAVENOUS at 08:53

## 2025-08-07 RX ADMIN — FAMOTIDINE 20 MG: 10 INJECTION, SOLUTION INTRAVENOUS at 08:40

## 2025-08-07 RX ADMIN — SODIUM CHLORIDE 125 ML/HR: 9 INJECTION, SOLUTION INTRAVENOUS at 08:41

## 2025-08-07 RX ADMIN — GLYCOPYRROLATE 0.2 MG: 0.2 INJECTION INTRAMUSCULAR; INTRAVENOUS at 09:20

## 2025-08-07 RX ADMIN — METOCLOPRAMIDE 10 MG: 5 INJECTION, SOLUTION INTRAMUSCULAR; INTRAVENOUS at 08:40

## 2025-08-07 RX ADMIN — PROPOFOL 200 MG: 10 INJECTION, EMULSION INTRAVENOUS at 08:54

## 2025-08-07 ASSESSMENT — PAIN SCALES - GENERAL
PAINLEVEL_OUTOF10: 0 - NO PAIN
PAIN_LEVEL: 0
PAINLEVEL_OUTOF10: 0 - NO PAIN

## 2025-08-07 ASSESSMENT — PAIN - FUNCTIONAL ASSESSMENT
PAIN_FUNCTIONAL_ASSESSMENT: 0-10

## 2025-08-07 NOTE — ANESTHESIA PREPROCEDURE EVALUATION
Patient: Betsy Sams    Procedure Information       Date/Time: 08/07/25 0963    Scheduled providers: Silvano Luciano DO    Procedures:       EGD      COLONOSCOPY    Location: [facility] OR            Relevant Problems   Cardiac   (+) Hypertriglyceridemia   (+) Mixed hyperlipidemia   (+) PAD (peripheral artery disease)      Pulmonary   (+) Adenocarcinoma, lung (Multi)   (+) TOAN (obstructive sleep apnea)   (+) Stage 4 lung cancer (Multi)      Neuro   (+) Anxiety   (+) Depression   (+) Lung cancer metastatic to brain (Multi)   (+) Metastatic cancer to brain (Multi)      GI   (+) Dysphagia   (+) GERD (gastroesophageal reflux disease)   (+) IBS (irritable bowel syndrome)      Liver   (+) Gallbladder sludge      Endocrine   (+) Hypothyroidism   (+) Hypothyroidism due to medication   (+) Type 2 diabetes mellitus with stage 3 chronic kidney disease, without long-term current use of insulin, unspecified whether stage 3a or 3b CKD (Multi)      HEENT   (+) Chronic sinusitis      ID   (+) Onychomycosis of toenail      GYN   (+) History of hysterectomy       Clinical information reviewed:   Tobacco  Allergies  Meds   Med Hx  Surg Hx   Fam Hx  Soc Hx        NPO Detail:  NPO/Void Status  Date of Last Liquid: 08/05/25  Time of Last Liquid: 2000  Date of Last Solid: 08/07/25  Time of Last Solid: 0500  Last Intake Type: Clear fluids; Light meal         PHYSICAL EXAM    Anesthesia Plan    History of general anesthesia?: yes  History of complications of general anesthesia?: no    ASA 3     MAC     Anesthetic plan and risks discussed with patient.    Plan discussed with CRNA.

## 2025-08-07 NOTE — INTERVAL H&P NOTE
H&P reviewed. The patient was examined and there are no changes to the H&P.    Risk and benefits of the endoscopic procedure including bleeding perforation and infection were discussed with patient and they wish to proceed      Plavix has been held for 5 days

## 2025-08-07 NOTE — ANESTHESIA POSTPROCEDURE EVALUATION
Patient: Betsy Sams    Procedure Summary       Date: 08/07/25 Room / Location: [facility] OR    Anesthesia Start: 0851 Anesthesia Stop: 0933    Procedures:       EGD      COLONOSCOPY Diagnosis:       Nausea and vomiting, unspecified vomiting type      Irregular bowel habits    Scheduled Providers: Silvano Luciano DO Responsible Provider: BERNICE Mora    Anesthesia Type: MAC ASA Status: 3            Anesthesia Type: MAC    Vitals Value Taken Time   /52 08/07/25 09:50   Temp 36.1 °C (97 °F) 08/07/25 09:30   Pulse 59 08/07/25 09:58   Resp 13 08/07/25 09:58   SpO2 95 % 08/07/25 09:57   Vitals shown include unfiled device data.    Anesthesia Post Evaluation    Patient location during evaluation: PACU  Patient participation: complete - patient participated  Level of consciousness: awake and alert  Pain score: 0  Pain management: adequate  Airway patency: patent  Cardiovascular status: hemodynamically stable  Respiratory status: room air  Hydration status: acceptable  Postoperative Nausea and Vomiting: none        No notable events documented.

## 2025-08-08 LAB
CALPROTECTIN STL-MCNT: 171 MCG/G
ELASTASE PANC STL-MCNT: >800 MCG/G

## 2025-08-08 ASSESSMENT — PAIN SCALES - GENERAL: PAINLEVEL_OUTOF10: 0 - NO PAIN

## 2025-08-10 ENCOUNTER — PATIENT MESSAGE (OUTPATIENT)
Dept: PRIMARY CARE | Facility: CLINIC | Age: 60
End: 2025-08-10
Payer: COMMERCIAL

## 2025-08-10 DIAGNOSIS — R06.02 SOB (SHORTNESS OF BREATH): ICD-10-CM

## 2025-08-11 ENCOUNTER — HOSPITAL ENCOUNTER (OUTPATIENT)
Dept: RADIOLOGY | Facility: EXTERNAL LOCATION | Age: 60
Discharge: HOME | End: 2025-08-11
Payer: COMMERCIAL

## 2025-08-12 ENCOUNTER — HOSPITAL ENCOUNTER (OUTPATIENT)
Dept: RADIOLOGY | Facility: CLINIC | Age: 60
Discharge: HOME | End: 2025-08-12
Payer: COMMERCIAL

## 2025-08-12 DIAGNOSIS — C79.31 METASTATIC CANCER TO BRAIN (MULTI): ICD-10-CM

## 2025-08-12 DIAGNOSIS — C34.90 ADENOCARCINOMA OF LUNG, UNSPECIFIED LATERALITY (MULTI): ICD-10-CM

## 2025-08-12 PROCEDURE — A9575 INJ GADOTERATE MEGLUMI 0.1ML: HCPCS | Performed by: NURSE PRACTITIONER

## 2025-08-12 PROCEDURE — 70553 MRI BRAIN STEM W/O & W/DYE: CPT | Performed by: RADIOLOGY

## 2025-08-12 PROCEDURE — 2550000001 HC RX 255 CONTRASTS: Performed by: NURSE PRACTITIONER

## 2025-08-12 PROCEDURE — 70553 MRI BRAIN STEM W/O & W/DYE: CPT

## 2025-08-12 RX ORDER — GADOTERATE MEGLUMINE 376.9 MG/ML
0.2 INJECTION INTRAVENOUS
Status: COMPLETED | OUTPATIENT
Start: 2025-08-12 | End: 2025-08-12

## 2025-08-12 RX ADMIN — GADOTERATE MEGLUMINE 15 ML: 376.9 INJECTION INTRAVENOUS at 11:27

## 2025-08-20 RX ORDER — ALBUTEROL SULFATE 90 UG/1
2 INHALANT RESPIRATORY (INHALATION) EVERY 4 HOURS PRN
Qty: 18 G | Refills: 0 | Status: SHIPPED | OUTPATIENT
Start: 2025-08-20

## 2025-08-20 RX ORDER — ALBUTEROL SULFATE 90 UG/1
2 INHALANT RESPIRATORY (INHALATION) EVERY 4 HOURS PRN
COMMUNITY
End: 2025-08-20 | Stop reason: SDUPTHER

## 2025-08-25 ASSESSMENT — ENCOUNTER SYMPTOMS
NERVOUS/ANXIOUS: 0
COUGH: 0
PALPITATIONS: 0
NECK PAIN: 0
BLOOD IN STOOL: 0
SHORTNESS OF BREATH: 0
FEVER: 0
SPEECH DIFFICULTY: 0
SEIZURES: 0
EXTREMITY WEAKNESS: 0
BACK PAIN: 0
CHEST TIGHTNESS: 0
CONSTIPATION: 0
CHILLS: 0
ARTHRALGIAS: 0
DEPRESSION: 0
FATIGUE: 0
DIFFICULTY URINATING: 0
DIARRHEA: 0
DIZZINESS: 0
ABDOMINAL PAIN: 0
NECK STIFFNESS: 0
VOMITING: 0
LEG SWELLING: 0
TROUBLE SWALLOWING: 0
WHEEZING: 0
EYE PROBLEMS: 0
HEMATURIA: 0
NUMBNESS: 0
NAUSEA: 0
CONFUSION: 0
ABDOMINAL DISTENTION: 0

## 2025-08-26 ENCOUNTER — HOSPITAL ENCOUNTER (OUTPATIENT)
Dept: RADIATION ONCOLOGY | Facility: CLINIC | Age: 60
Setting detail: RADIATION/ONCOLOGY SERIES
Discharge: HOME | End: 2025-08-26
Payer: COMMERCIAL

## 2025-08-26 VITALS
WEIGHT: 172.03 LBS | HEART RATE: 72 BPM | BODY MASS INDEX: 27.77 KG/M2 | RESPIRATION RATE: 18 BRPM | DIASTOLIC BLOOD PRESSURE: 58 MMHG | SYSTOLIC BLOOD PRESSURE: 104 MMHG | OXYGEN SATURATION: 98 % | TEMPERATURE: 97.9 F

## 2025-08-26 DIAGNOSIS — C34.90 ADENOCARCINOMA OF LUNG, UNSPECIFIED LATERALITY (MULTI): Primary | ICD-10-CM

## 2025-08-26 DIAGNOSIS — C79.31 METASTATIC CANCER TO BRAIN (MULTI): ICD-10-CM

## 2025-08-26 PROCEDURE — 99214 OFFICE O/P EST MOD 30 MIN: CPT

## 2025-08-26 ASSESSMENT — ENCOUNTER SYMPTOMS
DEPRESSION: 0
LOSS OF SENSATION IN FEET: 0
OCCASIONAL FEELINGS OF UNSTEADINESS: 0
HEADACHES: 1

## 2025-08-26 ASSESSMENT — PAIN SCALES - GENERAL: PAINLEVEL_OUTOF10: 0-NO PAIN

## 2025-09-02 DIAGNOSIS — M25.561 ACUTE PAIN OF RIGHT KNEE: ICD-10-CM

## 2025-09-03 ENCOUNTER — APPOINTMENT (OUTPATIENT)
Dept: ORTHOPEDIC SURGERY | Age: 60
End: 2025-09-03
Payer: COMMERCIAL

## 2025-09-03 RX ORDER — DICLOFENAC SODIUM 75 MG/1
TABLET, DELAYED RELEASE ORAL
Qty: 10 TABLET | Refills: 0 | Status: SHIPPED | OUTPATIENT
Start: 2025-09-03

## 2025-09-10 ENCOUNTER — APPOINTMENT (OUTPATIENT)
Dept: ORTHOPEDIC SURGERY | Age: 60
End: 2025-09-10
Payer: COMMERCIAL

## 2025-09-30 ENCOUNTER — APPOINTMENT (OUTPATIENT)
Dept: SURGERY | Facility: CLINIC | Age: 60
End: 2025-09-30
Payer: COMMERCIAL

## 2025-11-20 ENCOUNTER — APPOINTMENT (OUTPATIENT)
Dept: ENDOCRINOLOGY | Facility: CLINIC | Age: 60
End: 2025-11-20
Payer: COMMERCIAL

## 2025-12-22 ENCOUNTER — APPOINTMENT (OUTPATIENT)
Dept: PRIMARY CARE | Facility: CLINIC | Age: 60
End: 2025-12-22
Payer: COMMERCIAL

## 2026-02-05 ENCOUNTER — APPOINTMENT (OUTPATIENT)
Dept: ENDOCRINOLOGY | Facility: CLINIC | Age: 61
End: 2026-02-05
Payer: COMMERCIAL

## 2026-03-10 ENCOUNTER — APPOINTMENT (OUTPATIENT)
Dept: SURGERY | Facility: CLINIC | Age: 61
End: 2026-03-10
Payer: COMMERCIAL

## (undated) DEVICE — Device

## (undated) DEVICE — ELECTRODE, ELECTROSURGICAL, PENCIL, HAND CONTROL, BLADE, 10 FT CORD, LF

## (undated) DEVICE — ELECTRODE, LAPAROSCOPIC, SPATULA, 5MM X 32CM

## (undated) DEVICE — GRASPER TIP, LAPCLINCH, DISP

## (undated) DEVICE — STRIP, SKIN CLOSURE, STERI STRIP, REINFORCED, 0.5 X 4 IN

## (undated) DEVICE — LIGASURE, V SEALER/DIVIDER  5MM BLUNT TIP

## (undated) DEVICE — SLEEVE, VASO PRESS, CALF GARMENT, MEDIUM, GREEN

## (undated) DEVICE — TROCAR, KII OPTICAL BLADELESS 5MM Z THREAD 100MM LNGTH

## (undated) DEVICE — SLEEVE, KII, Z-THREAD, 5X100CM

## (undated) DEVICE — DISSECTOR, KITTNER, PEANUT, 5MM

## (undated) DEVICE — SCISSOR TIP, ENDOCUT, LAPAROSCOPIC

## (undated) DEVICE — EVACUATOR, WOUND, SUCTION, CLOSED, JACKSON-PRATT, 100 CC, SILICONE

## (undated) DEVICE — NEEDLE, SPINAL, 22 G X 3.5 IN, BLACK HUB

## (undated) DEVICE — CARE KIT, LAPAROSCOPIC, ADVANCED

## (undated) DEVICE — APPLICATOR, CHLORAPREP, W/ORANGE TINT, 26ML

## (undated) DEVICE — TUBE SET, PNEUMOLAR HEATED, SMOKE EVACU, HIGH-FLOW

## (undated) DEVICE — DRAIN, WOUND, FLAT, JACKSON-PRATT, FULL PERFORATION, W/O TROCAR, 10 MM, SILICONE

## (undated) DEVICE — GRASPER TIP, LONG FENESTRATED, DISP

## (undated) DEVICE — PASSER, SUTURE, W/ 10/12MM SUTURE GUIDE

## (undated) DEVICE — TROCAR, OPTICAL, BLADELESS, 12MM, THREADED, 100MM LENGTH